# Patient Record
Sex: FEMALE | Race: WHITE | NOT HISPANIC OR LATINO | Employment: OTHER | URBAN - METROPOLITAN AREA
[De-identification: names, ages, dates, MRNs, and addresses within clinical notes are randomized per-mention and may not be internally consistent; named-entity substitution may affect disease eponyms.]

---

## 2023-02-22 LAB
ALANINE AMINOTRANSFERASE (SGPT) (U/L) IN SER/PLAS: 10 U/L (ref 7–45)
ALBUMIN (G/DL) IN SER/PLAS: 4.3 G/DL (ref 3.4–5)
ALKALINE PHOSPHATASE (U/L) IN SER/PLAS: 67 U/L (ref 33–136)
ANION GAP IN SER/PLAS: 11 MMOL/L (ref 10–20)
ASPARTATE AMINOTRANSFERASE (SGOT) (U/L) IN SER/PLAS: 19 U/L (ref 9–39)
BILIRUBIN TOTAL (MG/DL) IN SER/PLAS: 0.6 MG/DL (ref 0–1.2)
CALCIDIOL (25 OH VITAMIN D3) (NG/ML) IN SER/PLAS: 40 NG/ML
CALCIUM (MG/DL) IN SER/PLAS: 9.3 MG/DL (ref 8.6–10.3)
CARBON DIOXIDE, TOTAL (MMOL/L) IN SER/PLAS: 30 MMOL/L (ref 21–32)
CHLORIDE (MMOL/L) IN SER/PLAS: 98 MMOL/L (ref 98–107)
CHOLESTEROL (MG/DL) IN SER/PLAS: 241 MG/DL (ref 0–199)
CHOLESTEROL IN HDL (MG/DL) IN SER/PLAS: 88.8 MG/DL
CHOLESTEROL/HDL RATIO: 2.7
CREATININE (MG/DL) IN SER/PLAS: 1.02 MG/DL (ref 0.5–1.05)
GFR FEMALE: 51 ML/MIN/1.73M2
GLUCOSE (MG/DL) IN SER/PLAS: 101 MG/DL (ref 74–99)
LDL: 141 MG/DL (ref 0–99)
POTASSIUM (MMOL/L) IN SER/PLAS: 4.2 MMOL/L (ref 3.5–5.3)
PROTEIN TOTAL: 7.3 G/DL (ref 6.4–8.2)
SODIUM (MMOL/L) IN SER/PLAS: 135 MMOL/L (ref 136–145)
THYROTROPIN (MIU/L) IN SER/PLAS BY DETECTION LIMIT <= 0.05 MIU/L: 4.65 MIU/L (ref 0.44–3.98)
THYROXINE (T4) FREE (NG/DL) IN SER/PLAS: 0.89 NG/DL (ref 0.61–1.12)
TRIGLYCERIDE (MG/DL) IN SER/PLAS: 58 MG/DL (ref 0–149)
UREA NITROGEN (MG/DL) IN SER/PLAS: 26 MG/DL (ref 6–23)
VLDL: 12 MG/DL (ref 0–40)

## 2023-09-13 LAB
ANION GAP IN SER/PLAS: 12 MMOL/L (ref 10–20)
BASOPHILS (10*3/UL) IN BLOOD BY AUTOMATED COUNT: 0.03 X10E9/L (ref 0–0.1)
BASOPHILS/100 LEUKOCYTES IN BLOOD BY AUTOMATED COUNT: 0.4 % (ref 0–2)
CALCIUM (MG/DL) IN SER/PLAS: 8.3 MG/DL (ref 8.6–10.3)
CARBON DIOXIDE, TOTAL (MMOL/L) IN SER/PLAS: 30 MMOL/L (ref 21–32)
CHLORIDE (MMOL/L) IN SER/PLAS: 97 MMOL/L (ref 98–107)
CREATININE (MG/DL) IN SER/PLAS: 0.95 MG/DL (ref 0.5–1.05)
EOSINOPHILS (10*3/UL) IN BLOOD BY AUTOMATED COUNT: 0.27 X10E9/L (ref 0–0.4)
EOSINOPHILS/100 LEUKOCYTES IN BLOOD BY AUTOMATED COUNT: 3.4 % (ref 0–6)
ERYTHROCYTE DISTRIBUTION WIDTH (RATIO) BY AUTOMATED COUNT: 13.3 % (ref 11.5–14.5)
ERYTHROCYTE MEAN CORPUSCULAR HEMOGLOBIN CONCENTRATION (G/DL) BY AUTOMATED: 31.1 G/DL (ref 32–36)
ERYTHROCYTE MEAN CORPUSCULAR VOLUME (FL) BY AUTOMATED COUNT: 92 FL (ref 80–100)
ERYTHROCYTES (10*6/UL) IN BLOOD BY AUTOMATED COUNT: 3.39 X10E12/L (ref 4–5.2)
GFR FEMALE: 56 ML/MIN/1.73M2
GLUCOSE (MG/DL) IN SER/PLAS: 79 MG/DL (ref 74–99)
HEMATOCRIT (%) IN BLOOD BY AUTOMATED COUNT: 31.2 % (ref 36–46)
HEMOGLOBIN (G/DL) IN BLOOD: 9.7 G/DL (ref 12–16)
IMMATURE GRANULOCYTES/100 LEUKOCYTES IN BLOOD BY AUTOMATED COUNT: 1.4 % (ref 0–0.9)
LEUKOCYTES (10*3/UL) IN BLOOD BY AUTOMATED COUNT: 7.9 X10E9/L (ref 4.4–11.3)
LYMPHOCYTES (10*3/UL) IN BLOOD BY AUTOMATED COUNT: 1.01 X10E9/L (ref 0.8–3)
LYMPHOCYTES/100 LEUKOCYTES IN BLOOD BY AUTOMATED COUNT: 12.8 % (ref 13–44)
MONOCYTES (10*3/UL) IN BLOOD BY AUTOMATED COUNT: 0.68 X10E9/L (ref 0.05–0.8)
MONOCYTES/100 LEUKOCYTES IN BLOOD BY AUTOMATED COUNT: 8.6 % (ref 2–10)
NEUTROPHILS (10*3/UL) IN BLOOD BY AUTOMATED COUNT: 5.78 X10E9/L (ref 1.6–5.5)
NEUTROPHILS/100 LEUKOCYTES IN BLOOD BY AUTOMATED COUNT: 73.4 % (ref 40–80)
NRBC (PER 100 WBCS) BY AUTOMATED COUNT: 0 /100 WBC (ref 0–0)
PLATELETS (10*3/UL) IN BLOOD AUTOMATED COUNT: 316 X10E9/L (ref 150–450)
POTASSIUM (MMOL/L) IN SER/PLAS: 4.5 MMOL/L (ref 3.5–5.3)
SODIUM (MMOL/L) IN SER/PLAS: 134 MMOL/L (ref 136–145)
UREA NITROGEN (MG/DL) IN SER/PLAS: 29 MG/DL (ref 6–23)

## 2023-09-14 LAB
ALANINE AMINOTRANSFERASE (SGPT) (U/L) IN SER/PLAS: 40 U/L (ref 7–45)
ALBUMIN (G/DL) IN SER/PLAS: 2.9 G/DL (ref 3.4–5)
ALKALINE PHOSPHATASE (U/L) IN SER/PLAS: 66 U/L (ref 33–136)
ANION GAP IN SER/PLAS: 12 MMOL/L (ref 10–20)
ASPARTATE AMINOTRANSFERASE (SGOT) (U/L) IN SER/PLAS: 45 U/L (ref 9–39)
BASOPHILS (10*3/UL) IN BLOOD BY AUTOMATED COUNT: 0.03 X10E9/L (ref 0–0.1)
BASOPHILS/100 LEUKOCYTES IN BLOOD BY AUTOMATED COUNT: 0.3 % (ref 0–2)
BILIRUBIN TOTAL (MG/DL) IN SER/PLAS: 0.3 MG/DL (ref 0–1.2)
CALCIDIOL (25 OH VITAMIN D3) (NG/ML) IN SER/PLAS: 36 NG/ML
CALCIUM (MG/DL) IN SER/PLAS: 8.5 MG/DL (ref 8.6–10.3)
CARBON DIOXIDE, TOTAL (MMOL/L) IN SER/PLAS: 29 MMOL/L (ref 21–32)
CHLORIDE (MMOL/L) IN SER/PLAS: 98 MMOL/L (ref 98–107)
COBALAMIN (VITAMIN B12) (PG/ML) IN SER/PLAS: 629 PG/ML (ref 211–911)
CREATININE (MG/DL) IN SER/PLAS: 0.96 MG/DL (ref 0.5–1.05)
EOSINOPHILS (10*3/UL) IN BLOOD BY AUTOMATED COUNT: 0.26 X10E9/L (ref 0–0.4)
EOSINOPHILS/100 LEUKOCYTES IN BLOOD BY AUTOMATED COUNT: 2.9 % (ref 0–6)
ERYTHROCYTE DISTRIBUTION WIDTH (RATIO) BY AUTOMATED COUNT: 13.5 % (ref 11.5–14.5)
ERYTHROCYTE MEAN CORPUSCULAR HEMOGLOBIN CONCENTRATION (G/DL) BY AUTOMATED: 30.6 G/DL (ref 32–36)
ERYTHROCYTE MEAN CORPUSCULAR VOLUME (FL) BY AUTOMATED COUNT: 94 FL (ref 80–100)
ERYTHROCYTES (10*6/UL) IN BLOOD BY AUTOMATED COUNT: 3.25 X10E12/L (ref 4–5.2)
FOLATE (NG/ML) IN SER/PLAS: 13.9 NG/ML
GFR FEMALE: 55 ML/MIN/1.73M2
GLUCOSE (MG/DL) IN SER/PLAS: 73 MG/DL (ref 74–99)
HEMATOCRIT (%) IN BLOOD BY AUTOMATED COUNT: 30.4 % (ref 36–46)
HEMOGLOBIN (G/DL) IN BLOOD: 9.3 G/DL (ref 12–16)
IMMATURE GRANULOCYTES/100 LEUKOCYTES IN BLOOD BY AUTOMATED COUNT: 1.1 % (ref 0–0.9)
LEUKOCYTES (10*3/UL) IN BLOOD BY AUTOMATED COUNT: 9 X10E9/L (ref 4.4–11.3)
LYMPHOCYTES (10*3/UL) IN BLOOD BY AUTOMATED COUNT: 1.09 X10E9/L (ref 0.8–3)
LYMPHOCYTES/100 LEUKOCYTES IN BLOOD BY AUTOMATED COUNT: 12.1 % (ref 13–44)
MAGNESIUM (MG/DL) IN SER/PLAS: 1.86 MG/DL (ref 1.6–2.4)
MONOCYTES (10*3/UL) IN BLOOD BY AUTOMATED COUNT: 0.91 X10E9/L (ref 0.05–0.8)
MONOCYTES/100 LEUKOCYTES IN BLOOD BY AUTOMATED COUNT: 10.1 % (ref 2–10)
NEUTROPHILS (10*3/UL) IN BLOOD BY AUTOMATED COUNT: 6.62 X10E9/L (ref 1.6–5.5)
NEUTROPHILS/100 LEUKOCYTES IN BLOOD BY AUTOMATED COUNT: 73.5 % (ref 40–80)
NRBC (PER 100 WBCS) BY AUTOMATED COUNT: 0 /100 WBC (ref 0–0)
PLATELETS (10*3/UL) IN BLOOD AUTOMATED COUNT: 247 X10E9/L (ref 150–450)
POTASSIUM (MMOL/L) IN SER/PLAS: 4.5 MMOL/L (ref 3.5–5.3)
PROTEIN TOTAL: 5.1 G/DL (ref 6.4–8.2)
SODIUM (MMOL/L) IN SER/PLAS: 134 MMOL/L (ref 136–145)
UREA NITROGEN (MG/DL) IN SER/PLAS: 31 MG/DL (ref 6–23)

## 2023-11-26 ENCOUNTER — HOSPITAL ENCOUNTER (INPATIENT)
Facility: HOSPITAL | Age: 88
LOS: 9 days | Discharge: NURSING, REHAB OR LONG-TERM CARE | DRG: 689 | End: 2023-12-07
Attending: EMERGENCY MEDICINE | Admitting: INTERNAL MEDICINE
Payer: MEDICARE

## 2023-11-26 ENCOUNTER — HOSPITAL ENCOUNTER (OUTPATIENT)
Dept: CARDIOLOGY | Facility: HOSPITAL | Age: 88
Discharge: HOME | End: 2023-11-26
Payer: COMMERCIAL

## 2023-11-26 ENCOUNTER — APPOINTMENT (OUTPATIENT)
Dept: RADIOLOGY | Facility: HOSPITAL | Age: 88
DRG: 689 | End: 2023-11-26
Payer: MEDICARE

## 2023-11-26 DIAGNOSIS — R53.1 GENERALIZED WEAKNESS: Primary | ICD-10-CM

## 2023-11-26 DIAGNOSIS — M86.00 ACUTE HEMATOGENOUS OSTEOMYELITIS, UNSPECIFIED SITE (MULTI): ICD-10-CM

## 2023-11-26 DIAGNOSIS — N30.00 ACUTE CYSTITIS WITHOUT HEMATURIA: ICD-10-CM

## 2023-11-26 DIAGNOSIS — N39.0 URINARY TRACT INFECTION WITHOUT HEMATURIA, SITE UNSPECIFIED: ICD-10-CM

## 2023-11-26 LAB
ALBUMIN SERPL BCP-MCNC: 3.8 G/DL (ref 3.4–5)
ALP SERPL-CCNC: 73 U/L (ref 33–136)
ALT SERPL W P-5'-P-CCNC: 11 U/L (ref 7–45)
ANION GAP SERPL CALC-SCNC: 12 MMOL/L (ref 10–20)
APPEARANCE UR: ABNORMAL
AST SERPL W P-5'-P-CCNC: 20 U/L (ref 9–39)
BACTERIA #/AREA URNS AUTO: ABNORMAL /HPF
BASOPHILS # BLD AUTO: 0.05 X10*3/UL (ref 0–0.1)
BASOPHILS NFR BLD AUTO: 0.7 %
BILIRUB SERPL-MCNC: 0.4 MG/DL (ref 0–1.2)
BILIRUB UR STRIP.AUTO-MCNC: NEGATIVE MG/DL
BNP SERPL-MCNC: 341 PG/ML (ref 0–99)
BUN SERPL-MCNC: 26 MG/DL (ref 6–23)
CALCIUM SERPL-MCNC: 9.3 MG/DL (ref 8.6–10.3)
CARDIAC TROPONIN I PNL SERPL HS: 20 NG/L (ref 0–13)
CARDIAC TROPONIN I PNL SERPL HS: 20 NG/L (ref 0–13)
CARDIAC TROPONIN I PNL SERPL HS: 21 NG/L (ref 0–13)
CHLORIDE SERPL-SCNC: 99 MMOL/L (ref 98–107)
CO2 SERPL-SCNC: 29 MMOL/L (ref 21–32)
COLOR UR: YELLOW
CREAT SERPL-MCNC: 0.91 MG/DL (ref 0.5–1.05)
EOSINOPHIL # BLD AUTO: 0.15 X10*3/UL (ref 0–0.4)
EOSINOPHIL NFR BLD AUTO: 2 %
ERYTHROCYTE [DISTWIDTH] IN BLOOD BY AUTOMATED COUNT: 13.9 % (ref 11.5–14.5)
FLUAV RNA RESP QL NAA+PROBE: NOT DETECTED
FLUBV RNA RESP QL NAA+PROBE: NOT DETECTED
GFR SERPL CREATININE-BSD FRML MDRD: 59 ML/MIN/1.73M*2
GLUCOSE SERPL-MCNC: 102 MG/DL (ref 74–99)
GLUCOSE UR STRIP.AUTO-MCNC: NEGATIVE MG/DL
HCT VFR BLD AUTO: 32.8 % (ref 36–46)
HGB BLD-MCNC: 10.5 G/DL (ref 12–16)
HOLD SPECIMEN: NORMAL
IMM GRANULOCYTES # BLD AUTO: 0.03 X10*3/UL (ref 0–0.5)
IMM GRANULOCYTES NFR BLD AUTO: 0.4 % (ref 0–0.9)
KETONES UR STRIP.AUTO-MCNC: NEGATIVE MG/DL
LEUKOCYTE ESTERASE UR QL STRIP.AUTO: ABNORMAL
LYMPHOCYTES # BLD AUTO: 0.74 X10*3/UL (ref 0.8–3)
LYMPHOCYTES NFR BLD AUTO: 9.7 %
MCH RBC QN AUTO: 30.5 PG (ref 26–34)
MCHC RBC AUTO-ENTMCNC: 32 G/DL (ref 32–36)
MCV RBC AUTO: 95 FL (ref 80–100)
MONOCYTES # BLD AUTO: 0.66 X10*3/UL (ref 0.05–0.8)
MONOCYTES NFR BLD AUTO: 8.7 %
NEUTROPHILS # BLD AUTO: 5.98 X10*3/UL (ref 1.6–5.5)
NEUTROPHILS NFR BLD AUTO: 78.5 %
NITRITE UR QL STRIP.AUTO: NEGATIVE
NRBC BLD-RTO: 0 /100 WBCS (ref 0–0)
PH UR STRIP.AUTO: 5 [PH]
PLATELET # BLD AUTO: 277 X10*3/UL (ref 150–450)
POTASSIUM SERPL-SCNC: 4.5 MMOL/L (ref 3.5–5.3)
PROT SERPL-MCNC: 7.3 G/DL (ref 6.4–8.2)
PROT UR STRIP.AUTO-MCNC: NEGATIVE MG/DL
RBC # BLD AUTO: 3.44 X10*6/UL (ref 4–5.2)
RBC # UR STRIP.AUTO: ABNORMAL /UL
RBC #/AREA URNS AUTO: ABNORMAL /HPF
SARS-COV-2 RNA RESP QL NAA+PROBE: DETECTED
SODIUM SERPL-SCNC: 135 MMOL/L (ref 136–145)
SP GR UR STRIP.AUTO: 1.01
SQUAMOUS #/AREA URNS AUTO: ABNORMAL /HPF
UROBILINOGEN UR STRIP.AUTO-MCNC: 2 MG/DL
WBC # BLD AUTO: 7.6 X10*3/UL (ref 4.4–11.3)
WBC #/AREA URNS AUTO: ABNORMAL /HPF

## 2023-11-26 PROCEDURE — 2500000004 HC RX 250 GENERAL PHARMACY W/ HCPCS (ALT 636 FOR OP/ED): Performed by: EMERGENCY MEDICINE

## 2023-11-26 PROCEDURE — 80053 COMPREHEN METABOLIC PANEL: CPT | Performed by: EMERGENCY MEDICINE

## 2023-11-26 PROCEDURE — G0378 HOSPITAL OBSERVATION PER HR: HCPCS

## 2023-11-26 PROCEDURE — 85025 COMPLETE CBC W/AUTO DIFF WBC: CPT | Performed by: EMERGENCY MEDICINE

## 2023-11-26 PROCEDURE — 94762 N-INVAS EAR/PLS OXIMTRY CONT: CPT

## 2023-11-26 PROCEDURE — 93005 ELECTROCARDIOGRAM TRACING: CPT

## 2023-11-26 PROCEDURE — 36415 COLL VENOUS BLD VENIPUNCTURE: CPT | Performed by: NURSE PRACTITIONER

## 2023-11-26 PROCEDURE — 36415 COLL VENOUS BLD VENIPUNCTURE: CPT | Performed by: EMERGENCY MEDICINE

## 2023-11-26 PROCEDURE — 81001 URINALYSIS AUTO W/SCOPE: CPT | Performed by: EMERGENCY MEDICINE

## 2023-11-26 PROCEDURE — 84484 ASSAY OF TROPONIN QUANT: CPT | Performed by: NURSE PRACTITIONER

## 2023-11-26 PROCEDURE — 2500000004 HC RX 250 GENERAL PHARMACY W/ HCPCS (ALT 636 FOR OP/ED): Performed by: NURSE PRACTITIONER

## 2023-11-26 PROCEDURE — 96372 THER/PROPH/DIAG INJ SC/IM: CPT | Mod: 59

## 2023-11-26 PROCEDURE — 71045 X-RAY EXAM CHEST 1 VIEW: CPT | Performed by: RADIOLOGY

## 2023-11-26 PROCEDURE — 84484 ASSAY OF TROPONIN QUANT: CPT | Performed by: EMERGENCY MEDICINE

## 2023-11-26 PROCEDURE — 99285 EMERGENCY DEPT VISIT HI MDM: CPT | Performed by: EMERGENCY MEDICINE

## 2023-11-26 PROCEDURE — 99222 1ST HOSP IP/OBS MODERATE 55: CPT | Performed by: NURSE PRACTITIONER

## 2023-11-26 PROCEDURE — 2500000001 HC RX 250 WO HCPCS SELF ADMINISTERED DRUGS (ALT 637 FOR MEDICARE OP): Performed by: NURSE PRACTITIONER

## 2023-11-26 PROCEDURE — 87086 URINE CULTURE/COLONY COUNT: CPT | Mod: PARLAB | Performed by: EMERGENCY MEDICINE

## 2023-11-26 PROCEDURE — 71045 X-RAY EXAM CHEST 1 VIEW: CPT | Mod: FY

## 2023-11-26 PROCEDURE — 83880 ASSAY OF NATRIURETIC PEPTIDE: CPT | Performed by: EMERGENCY MEDICINE

## 2023-11-26 PROCEDURE — 96365 THER/PROPH/DIAG IV INF INIT: CPT

## 2023-11-26 PROCEDURE — 87636 SARSCOV2 & INF A&B AMP PRB: CPT | Performed by: NURSE PRACTITIONER

## 2023-11-26 RX ORDER — AMLODIPINE BESYLATE 10 MG/1
10 TABLET ORAL DAILY
COMMUNITY
Start: 2023-03-09

## 2023-11-26 RX ORDER — ATORVASTATIN CALCIUM 20 MG/1
20 TABLET, FILM COATED ORAL DAILY
COMMUNITY

## 2023-11-26 RX ORDER — ACETAMINOPHEN 650 MG/1
650 SUPPOSITORY RECTAL EVERY 4 HOURS PRN
Status: DISCONTINUED | OUTPATIENT
Start: 2023-11-26 | End: 2023-12-08 | Stop reason: HOSPADM

## 2023-11-26 RX ORDER — ENOXAPARIN SODIUM 100 MG/ML
30 INJECTION SUBCUTANEOUS EVERY 24 HOURS
Status: DISCONTINUED | OUTPATIENT
Start: 2023-11-26 | End: 2023-12-08 | Stop reason: HOSPADM

## 2023-11-26 RX ORDER — ASPIRIN 81 MG/1
81 TABLET ORAL DAILY
Status: DISCONTINUED | OUTPATIENT
Start: 2023-11-26 | End: 2023-12-08 | Stop reason: HOSPADM

## 2023-11-26 RX ORDER — CEFTRIAXONE 1 G/50ML
1 INJECTION, SOLUTION INTRAVENOUS ONCE
Status: COMPLETED | OUTPATIENT
Start: 2023-11-26 | End: 2023-11-26

## 2023-11-26 RX ORDER — LANOLIN ALCOHOL/MO/W.PET/CERES
1 CREAM (GRAM) TOPICAL DAILY
COMMUNITY
Start: 2019-06-16

## 2023-11-26 RX ORDER — LEVOTHYROXINE SODIUM 75 UG/1
75 TABLET ORAL
Status: DISCONTINUED | OUTPATIENT
Start: 2023-11-27 | End: 2023-12-08 | Stop reason: HOSPADM

## 2023-11-26 RX ORDER — ASPIRIN 81 MG/1
81 TABLET ORAL DAILY
COMMUNITY
Start: 2011-10-17

## 2023-11-26 RX ORDER — ANASTROZOLE 1 MG/1
1 TABLET ORAL DAILY
Status: DISCONTINUED | OUTPATIENT
Start: 2023-11-26 | End: 2023-12-08 | Stop reason: HOSPADM

## 2023-11-26 RX ORDER — ACETAMINOPHEN 160 MG/5ML
650 SOLUTION ORAL EVERY 4 HOURS PRN
Status: DISCONTINUED | OUTPATIENT
Start: 2023-11-26 | End: 2023-12-08 | Stop reason: HOSPADM

## 2023-11-26 RX ORDER — ATORVASTATIN CALCIUM 20 MG/1
20 TABLET, FILM COATED ORAL DAILY
Status: DISCONTINUED | OUTPATIENT
Start: 2023-11-26 | End: 2023-12-08 | Stop reason: HOSPADM

## 2023-11-26 RX ORDER — LANOLIN ALCOHOL/MO/W.PET/CERES
1000 CREAM (GRAM) TOPICAL DAILY
Status: DISCONTINUED | OUTPATIENT
Start: 2023-11-26 | End: 2023-12-08 | Stop reason: HOSPADM

## 2023-11-26 RX ORDER — CHOLECALCIFEROL (VITAMIN D3) 50 MCG
1 TABLET ORAL DAILY
COMMUNITY
Start: 2021-06-11

## 2023-11-26 RX ORDER — CHOLECALCIFEROL (VITAMIN D3) 25 MCG
2000 TABLET ORAL DAILY
Status: DISCONTINUED | OUTPATIENT
Start: 2023-11-26 | End: 2023-12-08 | Stop reason: HOSPADM

## 2023-11-26 RX ORDER — LEVOTHYROXINE SODIUM 75 UG/1
0.5 TABLET ORAL
COMMUNITY

## 2023-11-26 RX ORDER — POLYETHYLENE GLYCOL 3350 17 G/17G
17 POWDER, FOR SOLUTION ORAL DAILY
Status: DISCONTINUED | OUTPATIENT
Start: 2023-11-26 | End: 2023-12-08 | Stop reason: HOSPADM

## 2023-11-26 RX ORDER — LEVOTHYROXINE SODIUM 75 UG/1
37.5 TABLET ORAL
Status: DISCONTINUED | OUTPATIENT
Start: 2023-11-26 | End: 2023-12-08 | Stop reason: HOSPADM

## 2023-11-26 RX ORDER — AMLODIPINE BESYLATE 10 MG/1
10 TABLET ORAL DAILY
Status: DISCONTINUED | OUTPATIENT
Start: 2023-11-26 | End: 2023-12-08 | Stop reason: HOSPADM

## 2023-11-26 RX ORDER — CEFTRIAXONE 2 G/50ML
2 INJECTION, SOLUTION INTRAVENOUS EVERY 24 HOURS
Status: DISCONTINUED | OUTPATIENT
Start: 2023-11-26 | End: 2023-11-28

## 2023-11-26 RX ORDER — ANASTROZOLE 1 MG/1
1 TABLET ORAL DAILY
COMMUNITY
Start: 2020-10-27

## 2023-11-26 RX ORDER — ACETAMINOPHEN 325 MG/1
650 TABLET ORAL EVERY 4 HOURS PRN
Status: DISCONTINUED | OUTPATIENT
Start: 2023-11-26 | End: 2023-12-08 | Stop reason: HOSPADM

## 2023-11-26 RX ORDER — LEVOTHYROXINE SODIUM 75 UG/1
75 TABLET ORAL
COMMUNITY
Start: 2018-04-03

## 2023-11-26 RX ORDER — L. ACIDOPHILUS/L.BULGARICUS 1MM CELL
1 TABLET ORAL 2 TIMES DAILY
Status: DISCONTINUED | OUTPATIENT
Start: 2023-11-26 | End: 2023-12-08 | Stop reason: HOSPADM

## 2023-11-26 RX ADMIN — Medication 1 TABLET: at 21:11

## 2023-11-26 RX ADMIN — ACETAMINOPHEN 650 MG: 160 SOLUTION ORAL at 21:19

## 2023-11-26 RX ADMIN — AMLODIPINE BESYLATE 10 MG: 10 TABLET ORAL at 17:04

## 2023-11-26 RX ADMIN — ACETAMINOPHEN 650 MG: 325 TABLET ORAL at 16:49

## 2023-11-26 RX ADMIN — ENOXAPARIN SODIUM 30 MG: 30 INJECTION SUBCUTANEOUS at 16:57

## 2023-11-26 RX ADMIN — ASPIRIN 81 MG: 81 TABLET, COATED ORAL at 14:55

## 2023-11-26 RX ADMIN — POLYETHYLENE GLYCOL 3350 17 G: 17 POWDER, FOR SOLUTION ORAL at 17:01

## 2023-11-26 RX ADMIN — ANASTROZOLE 1 MG: 1 TABLET, COATED ORAL at 16:52

## 2023-11-26 RX ADMIN — ATORVASTATIN CALCIUM 20 MG: 20 TABLET, FILM COATED ORAL at 16:51

## 2023-11-26 RX ADMIN — Medication 1 TABLET: at 16:46

## 2023-11-26 RX ADMIN — CHOLECALCIFEROL TAB 25 MCG (1000 UNIT) 2000 UNITS: 25 TAB at 16:44

## 2023-11-26 RX ADMIN — CYANOCOBALAMIN TAB 1000 MCG 1000 MCG: 1000 TAB at 16:51

## 2023-11-26 RX ADMIN — CEFTRIAXONE SODIUM 1 G: 1 INJECTION, SOLUTION INTRAVENOUS at 13:15

## 2023-11-26 ASSESSMENT — COLUMBIA-SUICIDE SEVERITY RATING SCALE - C-SSRS
2. HAVE YOU ACTUALLY HAD ANY THOUGHTS OF KILLING YOURSELF?: NO
6. HAVE YOU EVER DONE ANYTHING, STARTED TO DO ANYTHING, OR PREPARED TO DO ANYTHING TO END YOUR LIFE?: NO
1. IN THE PAST MONTH, HAVE YOU WISHED YOU WERE DEAD OR WISHED YOU COULD GO TO SLEEP AND NOT WAKE UP?: NO

## 2023-11-26 ASSESSMENT — PAIN SCALES - GENERAL
PAINLEVEL_OUTOF10: 0 - NO PAIN
PAINLEVEL_OUTOF10: 3
PAINLEVEL_OUTOF10: 0 - NO PAIN
PAINLEVEL_OUTOF10: 0 - NO PAIN

## 2023-11-26 ASSESSMENT — PAIN - FUNCTIONAL ASSESSMENT
PAIN_FUNCTIONAL_ASSESSMENT: 0-10

## 2023-11-26 ASSESSMENT — PAIN DESCRIPTION - DESCRIPTORS: DESCRIPTORS: ACHING

## 2023-11-26 NOTE — H&P
History Of Present Illness  Regi Leger is a 89 y.o. female with past medical history of abdominal aortic aneurysm s/p repair (01/2022), endoleak of aortic graft (no intervention pursued per patient's wishes), severe aortic stenosis, CAD s/p PCI/stent RCA, hypertension, hyperlipidemia, hypothyroidism, vocal cord paralysis following thyroidectomy,  CKD stage III, nephrolithiasis, urinary retention, PVD, anemia, osteoarthritis, DJD, breast cancer, and skin cancer who presented today with weakness.  HPI somewhat limited due to patient is hard of hearing.  She reports over the past few days she has been experiencing progressively worsening weakness.  She reports she became concerned that she may fall so she came to the emergency room for evaluation.  She admits to associated body aches that she has been taking over-the-counter medicine for.  She denies any fevers, chills, chest pain, shortness of breath, cough, abdominal pain, nausea, vomiting, diarrhea, or dysuria, changes in her chronic urinary incontinence.    In the ED lab work, EKG, and chest x-ray were performed. Labs revealed glucose 102, sodium 135 (chronically mildly low), bun 26 (chronic), , troponin 20, glucose 22, chronic anemia noted with red blood cell count 2.44, hemoglobin 10.5, and hematocrit 32.8, neutrophils 5.98, lymphocytes 0.74 UA revealed yellow hazy urine with moderate blood urobilinogen 2, large leuks.  Chest x-ray revealed no acute process.  EKG, per ER physician impression revealed normal sinus rhythm at a rate of 67 with nonspecific changes.  Vital signs were stable while in the ED.  She was given Rocephin and admitted to Dr. Vasquez.    10 point ROS negative except as noted above in HPI     Past medical history: As above  Past surgical history: Thyroidectomy, hysterectomy, PTCA/PCI with stent to RCA, AAA repair, cataract surgery  Social history: No history of smoking, alcohol abuse, or illicit drug use.  Lives with sister and  "nephew.  Ambulates with walker.  Family history: Father-stomach cancer; mother-myocardial infarction     Allergies  Pravastatin and Penicillins     Physical Exam  Constitutional:       Comments: Thin   HENT:      Head: Normocephalic and atraumatic.      Mouth/Throat:      Mouth: Mucous membranes are dry.   Eyes:      Conjunctiva/sclera: Conjunctivae normal.   Cardiovascular:      Rate and Rhythm: Normal rate and regular rhythm.      Heart sounds: Murmur heard.   Pulmonary:      Effort: Pulmonary effort is normal.      Breath sounds: No rales.   Abdominal:      General: Abdomen is flat. Bowel sounds are normal.      Palpations: Abdomen is soft.   Musculoskeletal:         General: Normal range of motion.      Cervical back: Neck supple.   Neurological:      Mental Status: She is alert. Mental status is at baseline.   Psychiatric:         Mood and Affect: Mood normal.          Last Recorded Vitals  Blood pressure (!) 155/97, pulse 77, temperature 36.5 °C (97.7 °F), temperature source Tympanic, resp. rate 25, height 1.727 m (5' 8\"), weight 51.3 kg (113 lb), SpO2 97 %.    Relevant Results    No current facility-administered medications on file prior to encounter.     Current Outpatient Medications on File Prior to Encounter   Medication Sig Dispense Refill    amLODIPine (Norvasc) 10 mg tablet Take 1 tablet (10 mg) by mouth once daily.      anastrozole (Arimidex) 1 mg tablet Take 1 tablet (1 mg total) by mouth once daily.      aspirin 81 mg EC tablet Take 1 tablet (81 mg) by mouth once daily.      cholecalciferol (Vitamin D-3) 50 MCG (2000 UT) tablet Take 1 tablet (2,000 Units) by mouth once daily.      cyanocobalamin (Vitamin B-12) 1,000 mcg tablet Take 1 tablet (1,000 mcg) by mouth once daily.      levothyroxine (Synthroid) 75 mcg tablet Take 1 tablet (75 mcg) by mouth once a day on Monday, Wednesday, and Friday.      atorvastatin (Lipitor) 20 mg tablet Take 1 tablet (20 mg) by mouth once daily.      levothyroxine " (Synthroid, Levoxyl) 75 mcg tablet Take 0.5 tablets (37.5 mcg) by mouth once a day on Sunday, Tuesday, Thursday, and Saturday.        Results for orders placed or performed during the hospital encounter of 11/26/23 (from the past 24 hour(s))   CBC and Auto Differential   Result Value Ref Range    WBC 7.6 4.4 - 11.3 x10*3/uL    nRBC 0.0 0.0 - 0.0 /100 WBCs    RBC 3.44 (L) 4.00 - 5.20 x10*6/uL    Hemoglobin 10.5 (L) 12.0 - 16.0 g/dL    Hematocrit 32.8 (L) 36.0 - 46.0 %    MCV 95 80 - 100 fL    MCH 30.5 26.0 - 34.0 pg    MCHC 32.0 32.0 - 36.0 g/dL    RDW 13.9 11.5 - 14.5 %    Platelets 277 150 - 450 x10*3/uL    Neutrophils % 78.5 40.0 - 80.0 %    Immature Granulocytes %, Automated 0.4 0.0 - 0.9 %    Lymphocytes % 9.7 13.0 - 44.0 %    Monocytes % 8.7 2.0 - 10.0 %    Eosinophils % 2.0 0.0 - 6.0 %    Basophils % 0.7 0.0 - 2.0 %    Neutrophils Absolute 5.98 (H) 1.60 - 5.50 x10*3/uL    Immature Granulocytes Absolute, Automated 0.03 0.00 - 0.50 x10*3/uL    Lymphocytes Absolute 0.74 (L) 0.80 - 3.00 x10*3/uL    Monocytes Absolute 0.66 0.05 - 0.80 x10*3/uL    Eosinophils Absolute 0.15 0.00 - 0.40 x10*3/uL    Basophils Absolute 0.05 0.00 - 0.10 x10*3/uL   Comprehensive metabolic panel   Result Value Ref Range    Glucose 102 (H) 74 - 99 mg/dL    Sodium 135 (L) 136 - 145 mmol/L    Potassium 4.5 3.5 - 5.3 mmol/L    Chloride 99 98 - 107 mmol/L    Bicarbonate 29 21 - 32 mmol/L    Anion Gap 12 10 - 20 mmol/L    Urea Nitrogen 26 (H) 6 - 23 mg/dL    Creatinine 0.91 0.50 - 1.05 mg/dL    eGFR 59 (L) >60 mL/min/1.73m*2    Calcium 9.3 8.6 - 10.3 mg/dL    Albumin 3.8 3.4 - 5.0 g/dL    Alkaline Phosphatase 73 33 - 136 U/L    Total Protein 7.3 6.4 - 8.2 g/dL    AST 20 9 - 39 U/L    Bilirubin, Total 0.4 0.0 - 1.2 mg/dL    ALT 11 7 - 45 U/L   Troponin I, High Sensitivity   Result Value Ref Range    Troponin I, High Sensitivity 20 (H) 0 - 13 ng/L   B-Type Natriuretic Peptide   Result Value Ref Range     (H) 0 - 99 pg/mL   Urinalysis with  Reflex Microscopic and Culture   Result Value Ref Range    Color, Urine Yellow Straw, Yellow    Appearance, Urine Hazy (N) Clear    Specific Gravity, Urine 1.015 1.005 - 1.035    pH, Urine 5.0 5.0, 5.5, 6.0, 6.5, 7.0, 7.5, 8.0    Protein, Urine NEGATIVE NEGATIVE mg/dL    Glucose, Urine NEGATIVE NEGATIVE mg/dL    Blood, Urine MODERATE (2+) (A) NEGATIVE    Ketones, Urine NEGATIVE NEGATIVE mg/dL    Bilirubin, Urine NEGATIVE NEGATIVE    Urobilinogen, Urine 2.0 (N) <2.0 mg/dL    Nitrite, Urine NEGATIVE NEGATIVE    Leukocyte Esterase, Urine LARGE (3+) (A) NEGATIVE   Extra Urine Gray Tube   Result Value Ref Range    Extra Tube Hold for add-ons.    Microscopic Only, Urine   Result Value Ref Range    WBC, Urine 21-50 (A) 1-5, NONE /HPF    RBC, Urine 3-5 NONE, 1-2, 3-5 /HPF    Squamous Epithelial Cells, Urine 1-9 (SPARSE) Reference range not established. /HPF    Bacteria, Urine 1+ (A) NONE SEEN /HPF       XR chest 1 view    Result Date: 11/26/2023  Interpreted By:  Prosper Olivas, STUDY: XR CHEST 1 VIEW;  11/26/2023 10:54 am   INDICATION: Signs/Symptoms:Weakness.   COMPARISON: Portable chest, 7 September 2023   ACCESSION NUMBER(S): [id number]   ORDERING CLINICIAN: NICKY SPENCER   TECHNIQUE: Single frontal view of the chest; Portable technique   FINDINGS:   The cardiomediastinal silhouette is unchanged   No consolidative lung opacity   No edema / failure   No large pleural effusion or demonstrable pneumothorax       NO ACUTE DISEASE IN THE CHEST   MACRO: None   Signed by: Prosper Olivas 11/26/2023 11:18 AM Dictation workstation:   [id number]      Assessment/Plan   Principal Problem:    UTI (urinary tract infection)  Body Aches   Elevated troponin  Weakness    Admit to [facility]  Observation  Continue rocephin  urine culture pending    CBC, BMP in am  PT/OT  Repeat troponin and EKG    Chronic conditions: Hypertension, hyperlipidemia, hypothyroidism, coronary artery disease, PVD, breast cancer    Continue home medications as listed  above    DVT prophylaxis    SCDs  Lovenox        I spent 45 minutes in the professional and overall care of this patient.    Patient fully evaluated plan as above.  Harmony Estrada, APRN-CNP

## 2023-11-26 NOTE — ED PROVIDER NOTES
HPI   Chief Complaint   Patient presents with    Weakness, Gen     Pt was recently admitted in the hospital , transferred to a nursing home, then recently discharged home withfamily. Pt states she is very weak and feels like she is going to fall. Otherwise state no issues.        89-year-old female who presents with generalized weakness.  Patient presented by squad stating that she has been weak over the past couple days.  She recently got out of a skilled nursing facility about a week ago.  She usually uses a walker at home.  She states the other day she was going to the bathroom was unable to get up from the toilet.  She denies any recent falls.  Denies any chest pain or shortness of breath.                          [place] Coma Scale Score: 15                  Patient History   Past Medical History:   Diagnosis Date    Abnormal findings on diagnostic imaging of other abdominal regions, including retroperitoneum     Abnormal abdominal ultrasound    Abnormal findings on diagnostic imaging of other abdominal regions, including retroperitoneum     Abnormal CT of the abdomen    Abnormal findings on diagnostic imaging of other specified body structures     Abnormal MRI    Abnormal findings on diagnostic imaging of other specified body structures     Abnormal ultrasound    Old myocardial infarction 12/09/2019    History of myocardial infarction    Personal history of other diseases of the nervous system and sense organs     History of cataract    Personal history of other medical treatment     History of mammogram    Personal history of other medical treatment     History of echocardiogram    Personal history of other specified conditions     History of heartburn    Personal history of urinary calculi 12/09/2019    History of renal calculi     Past Surgical History:   Procedure Laterality Date    CT ABDOMEN PELVIS ANGIOGRAM W AND/OR WO IV CONTRAST  12/9/2019    CT ABDOMEN PELVIS ANGIOGRAM W AND/OR WO IV CONTRAST  12/9/2019 [facility]    CT ABDOMEN PELVIS ANGIOGRAM W AND/OR WO IV CONTRAST  12/16/2021    CT ABDOMEN PELVIS ANGIOGRAM W AND/OR WO IV CONTRAST 12/16/2021 [facility]    CT ABDOMEN PELVIS ANGIOGRAM W AND/OR WO IV CONTRAST  8/15/2022    CT ABDOMEN PELVIS ANGIOGRAM W AND/OR WO IV CONTRAST 8/15/2022 [facility]    OTHER SURGICAL HISTORY  12/09/2019    Complete colonoscopy    OTHER SURGICAL HISTORY  12/09/2019    Hysterectomy    OTHER SURGICAL HISTORY  12/09/2019    Thyroidectomy    OTHER SURGICAL HISTORY  12/09/2019    Cataract surgery    OTHER SURGICAL HISTORY  01/13/2020    Cardiac catheterization with stent placement    OTHER SURGICAL HISTORY  01/13/2020    Cardiac catheterization    OTHER SURGICAL HISTORY  08/04/2020    Endoscopy    OTHER SURGICAL HISTORY  12/09/2019    Tonsillectomy     No family history on file.  Social History     Tobacco Use    Smoking status: Not on file    Smokeless tobacco: Not on file   Substance Use Topics    Alcohol use: Not on file    Drug use: Not on file       Physical Exam   ED Triage Vitals [11/26/23 0916]   Temp Heart Rate Resp BP   36.5 °C (97.7 °F) 69 18 134/60      SpO2 Temp Source Heart Rate Source Patient Position   99 % Tympanic Monitor --      BP Location FiO2 (%)     -- --       Physical Exam  Constitutional:       Appearance: Normal appearance. She is normal weight.   HENT:      Head: Normocephalic and atraumatic.      Nose: Nose normal.      Mouth/Throat:      Mouth: Mucous membranes are moist.      Pharynx: Oropharynx is clear.   Eyes:      Extraocular Movements: Extraocular movements intact.      Conjunctiva/sclera: Conjunctivae normal.      Pupils: Pupils are equal, round, and reactive to light.   Cardiovascular:      Rate and Rhythm: Normal rate and regular rhythm.   Pulmonary:      Effort: Pulmonary effort is normal.      Breath sounds: Normal breath sounds.   Abdominal:      General: Abdomen is flat. Bowel sounds are normal.       Palpations: Abdomen is soft.   Musculoskeletal:         General: Normal range of motion.      Cervical back: Normal range of motion and neck supple.   Skin:     General: Skin is warm and dry.      Capillary Refill: Capillary refill takes less than 2 seconds.   Neurological:      General: No focal deficit present.      Mental Status: She is alert.   Psychiatric:         Mood and Affect: Mood normal.         Behavior: Behavior normal.         Thought Content: Thought content normal.         Judgment: Judgment normal.       Labs Reviewed   CBC WITH AUTO DIFFERENTIAL - Abnormal       Result Value    WBC 7.6      nRBC 0.0      RBC 3.44 (*)     Hemoglobin 10.5 (*)     Hematocrit 32.8 (*)     MCV 95      MCH 30.5      MCHC 32.0      RDW 13.9      Platelets 277      Neutrophils % 78.5      Immature Granulocytes %, Automated 0.4      Lymphocytes % 9.7      Monocytes % 8.7      Eosinophils % 2.0      Basophils % 0.7      Neutrophils Absolute 5.98 (*)     Immature Granulocytes Absolute, Automated 0.03      Lymphocytes Absolute 0.74 (*)     Monocytes Absolute 0.66      Eosinophils Absolute 0.15      Basophils Absolute 0.05     COMPREHENSIVE METABOLIC PANEL - Abnormal    Glucose 102 (*)     Sodium 135 (*)     Potassium 4.5      Chloride 99      Bicarbonate 29      Anion Gap 12      Urea Nitrogen 26 (*)     Creatinine 0.91      eGFR 59 (*)     Calcium 9.3      Albumin 3.8      Alkaline Phosphatase 73      Total Protein 7.3      AST 20      Bilirubin, Total 0.4      ALT 11     TROPONIN I, HIGH SENSITIVITY - Abnormal    Troponin I, High Sensitivity 20 (*)     Narrative:     Less than 99th percentile of normal range cutoff-  Female and children under 18 years old <14 ng/L; Male <21 ng/L: Negative  Repeat testing should be performed if clinically indicated.     Female and children under 18 years old 14-50 ng/L; Male 21-50 ng/L:  Consistent with possible cardiac damage and possible increased clinical   risk. Serial measurements may  help to assess extent of myocardial damage.     >50 ng/L: Consistent with cardiac damage, increased clinical risk and  myocardial infarction. Serial measurements may help assess extent of   myocardial damage.      NOTE: Children less than 1 year old may have higher baseline troponin   levels and results should be interpreted in conjunction with the overall   clinical context.     NOTE: Troponin I testing is performed using a different   testing methodology at [facility] than at other   [facility]. Direct result comparisons should only   be made within the same method.   B-TYPE NATRIURETIC PEPTIDE - Abnormal     (*)     Narrative:        <100 pg/mL - Heart failure unlikely  100-299 pg/mL - Intermediate probability of acute heart                  failure exacerbation. Correlate with clinical                  context and patient history.    >=300 pg/mL - Heart Failure likely. Correlate with clinical                  context and patient history.    BNP testing is performed using different testing methodology at [facility] than at other [facility]. Direct result comparisons should only be made within the same method.      URINALYSIS WITH REFLEX MICROSCOPIC AND CULTURE - Abnormal    Color, Urine Yellow      Appearance, Urine Hazy (*)     Specific Gravity, Urine 1.015      pH, Urine 5.0      Protein, Urine NEGATIVE      Glucose, Urine NEGATIVE      Blood, Urine MODERATE (2+) (*)     Ketones, Urine NEGATIVE      Bilirubin, Urine NEGATIVE      Urobilinogen, Urine 2.0 (*)     Nitrite, Urine NEGATIVE      Leukocyte Esterase, Urine LARGE (3+) (*)    MICROSCOPIC ONLY, URINE - Abnormal    WBC, Urine 21-50 (*)     RBC, Urine 3-5      Squamous Epithelial Cells, Urine 1-9 (SPARSE)      Bacteria, Urine 1+ (*)    URINE CULTURE   URINALYSIS WITH REFLEX MICROSCOPIC AND CULTURE    Narrative:     The following orders were created for panel order Urinalysis with Reflex Microscopic and  Culture.  Procedure                               Abnormality         Status                     ---------                               -----------         ------                     Urinalysis with Reflex M...[[id number]]  Abnormal            Final result               Extra Urine Gray Tube[[id number]]                            In process                   Please view results for these tests on the individual orders.   EXTRA URINE GRAY TUBE       XR chest 1 view   Final Result   NO ACUTE DISEASE IN THE CHEST        MACRO:   None        Signed by: Prosper Olivas 11/26/2023 11:18 AM   Dictation workstation:   [id number]            ED Course & MDM   Diagnoses as of 11/26/23 1351   Generalized weakness   Urinary tract infection without hematuria, site unspecified       Medical Decision Making  Emergency department course, laboratory studies were obtained and reviewed which were unremarkable high-sensitivity troponin was 20.  EKG showed a normal sinus rhythm at a rate of 67 with nonspecific changes.  Chest x-ray shows no acute infiltrate or effusion.  Urinalysis was obtained which does show UTI present.  Urine culture will be sent.  Patient was given a gram of Rocephin IV.  We did attempt to ambulate the patient here in the emergency room she could barely get to the side of the bed without having weakness.  I did discuss this with Dr. Vasquez who is agreeable with admission.    Amount and/or Complexity of Data Reviewed  ECG/medicine tests: independent interpretation performed.     Details: EKG shows a normal sinus rhythm at a rate of 67 with nonspecific ST-T wave changes, interpreted by ED physician.        Procedure  Procedures     Shruthi Beck DO  11/26/23 1351

## 2023-11-26 NOTE — PROGRESS NOTES
Pharmacy Medication History Review    Regi Leger is a 89 y.o. female admitted for No Principal Problem: There is no principal problem currently on the Problem List. Please update the Problem List and refresh.. Pharmacy reviewed the patient's ronty-ni-aawrwlsoc medications and allergies for accuracy.    The list below reflectives the updated PTA list. Please review each medication in order reconciliation for additional clarification and justification.  (Not in a hospital admission)       The list below reflectives the updated allergy list. Please review each documented allergy for additional clarification and justification.  Allergies  Reviewed by Kelley Garrison RN on 11/26/2023        Severity Reactions Comments    Pravastatin Medium Myalgia     Penicillins Not Specified Unknown Hx from 40yrs ago             Below are additional concerns with the patient's PTA list.    See PTA med list    Vera Tello CPhT

## 2023-11-27 LAB
ANION GAP SERPL CALC-SCNC: 11 MMOL/L (ref 10–20)
BUN SERPL-MCNC: 23 MG/DL (ref 6–23)
CALCIUM SERPL-MCNC: 8.7 MG/DL (ref 8.6–10.3)
CHLORIDE SERPL-SCNC: 100 MMOL/L (ref 98–107)
CO2 SERPL-SCNC: 28 MMOL/L (ref 21–32)
CREAT SERPL-MCNC: 0.83 MG/DL (ref 0.5–1.05)
ERYTHROCYTE [DISTWIDTH] IN BLOOD BY AUTOMATED COUNT: 13.6 % (ref 11.5–14.5)
GFR SERPL CREATININE-BSD FRML MDRD: 66 ML/MIN/1.73M*2
GLUCOSE SERPL-MCNC: 79 MG/DL (ref 74–99)
HCT VFR BLD AUTO: 32.2 % (ref 36–46)
HGB BLD-MCNC: 10.1 G/DL (ref 12–16)
MCH RBC QN AUTO: 30.5 PG (ref 26–34)
MCHC RBC AUTO-ENTMCNC: 31.4 G/DL (ref 32–36)
MCV RBC AUTO: 97 FL (ref 80–100)
NRBC BLD-RTO: 0 /100 WBCS (ref 0–0)
PLATELET # BLD AUTO: 257 X10*3/UL (ref 150–450)
POTASSIUM SERPL-SCNC: 4.6 MMOL/L (ref 3.5–5.3)
RBC # BLD AUTO: 3.31 X10*6/UL (ref 4–5.2)
SODIUM SERPL-SCNC: 134 MMOL/L (ref 136–145)
WBC # BLD AUTO: 4.9 X10*3/UL (ref 4.4–11.3)

## 2023-11-27 PROCEDURE — 2500000005 HC RX 250 GENERAL PHARMACY W/O HCPCS: Performed by: INTERNAL MEDICINE

## 2023-11-27 PROCEDURE — 97161 PT EVAL LOW COMPLEX 20 MIN: CPT | Mod: GP

## 2023-11-27 PROCEDURE — 2500000004 HC RX 250 GENERAL PHARMACY W/ HCPCS (ALT 636 FOR OP/ED): Performed by: INTERNAL MEDICINE

## 2023-11-27 PROCEDURE — G0378 HOSPITAL OBSERVATION PER HR: HCPCS

## 2023-11-27 PROCEDURE — 2500000001 HC RX 250 WO HCPCS SELF ADMINISTERED DRUGS (ALT 637 FOR MEDICARE OP): Performed by: NURSE PRACTITIONER

## 2023-11-27 PROCEDURE — 85027 COMPLETE CBC AUTOMATED: CPT | Performed by: NURSE PRACTITIONER

## 2023-11-27 PROCEDURE — 36415 COLL VENOUS BLD VENIPUNCTURE: CPT | Performed by: NURSE PRACTITIONER

## 2023-11-27 PROCEDURE — 96372 THER/PROPH/DIAG INJ SC/IM: CPT | Performed by: NURSE PRACTITIONER

## 2023-11-27 PROCEDURE — 97165 OT EVAL LOW COMPLEX 30 MIN: CPT | Mod: GO

## 2023-11-27 PROCEDURE — 2500000004 HC RX 250 GENERAL PHARMACY W/ HCPCS (ALT 636 FOR OP/ED): Performed by: NURSE PRACTITIONER

## 2023-11-27 PROCEDURE — 80048 BASIC METABOLIC PNL TOTAL CA: CPT | Performed by: NURSE PRACTITIONER

## 2023-11-27 RX ORDER — LIDOCAINE 560 MG/1
1 PATCH PERCUTANEOUS; TOPICAL; TRANSDERMAL DAILY
Status: DISCONTINUED | OUTPATIENT
Start: 2023-11-27 | End: 2023-12-08 | Stop reason: HOSPADM

## 2023-11-27 RX ORDER — DEXAMETHASONE 6 MG/1
6 TABLET ORAL DAILY
Status: DISCONTINUED | OUTPATIENT
Start: 2023-11-27 | End: 2023-12-08 | Stop reason: HOSPADM

## 2023-11-27 RX ADMIN — LIDOCAINE 1 PATCH: 4 PATCH TOPICAL at 16:22

## 2023-11-27 RX ADMIN — ENOXAPARIN SODIUM 30 MG: 30 INJECTION SUBCUTANEOUS at 13:55

## 2023-11-27 RX ADMIN — DEXAMETHASONE 6 MG: 6 TABLET ORAL at 16:22

## 2023-11-27 RX ADMIN — CYANOCOBALAMIN TAB 1000 MCG 1000 MCG: 1000 TAB at 09:23

## 2023-11-27 RX ADMIN — ANASTROZOLE 1 MG: 1 TABLET, COATED ORAL at 12:58

## 2023-11-27 RX ADMIN — Medication 1 TABLET: at 09:23

## 2023-11-27 RX ADMIN — ACETAMINOPHEN 650 MG: 160 SOLUTION ORAL at 04:45

## 2023-11-27 RX ADMIN — ASPIRIN 81 MG: 81 TABLET, COATED ORAL at 09:23

## 2023-11-27 RX ADMIN — LEVOTHYROXINE SODIUM 75 MCG: 0.07 TABLET ORAL at 13:44

## 2023-11-27 RX ADMIN — POLYETHYLENE GLYCOL 3350 17 G: 17 POWDER, FOR SOLUTION ORAL at 09:22

## 2023-11-27 RX ADMIN — CHOLECALCIFEROL TAB 25 MCG (1000 UNIT) 2000 UNITS: 25 TAB at 09:23

## 2023-11-27 RX ADMIN — Medication 1 TABLET: at 20:53

## 2023-11-27 RX ADMIN — CEFTRIAXONE SODIUM 2 G: 2 INJECTION, SOLUTION INTRAVENOUS at 12:58

## 2023-11-27 RX ADMIN — AMLODIPINE BESYLATE 10 MG: 10 TABLET ORAL at 09:23

## 2023-11-27 RX ADMIN — ATORVASTATIN CALCIUM 20 MG: 20 TABLET, FILM COATED ORAL at 09:23

## 2023-11-27 ASSESSMENT — ACTIVITIES OF DAILY LIVING (ADL)
JUDGMENT_ADEQUATE_SAFELY_COMPLETE_DAILY_ACTIVITIES: YES
PATIENT'S MEMORY ADEQUATE TO SAFELY COMPLETE DAILY ACTIVITIES?: YES
GROOMING: NEEDS ASSISTANCE
TOILETING: NEEDS ASSISTANCE
DRESSING YOURSELF: NEEDS ASSISTANCE
ADEQUATE_TO_COMPLETE_ADL: YES
DRESSING YOURSELF: NEEDS ASSISTANCE
HEARING - LEFT EAR: FUNCTIONAL
FEEDING YOURSELF: INDEPENDENT
HEARING - RIGHT EAR: FUNCTIONAL
FEEDING YOURSELF: INDEPENDENT
ADEQUATE_TO_COMPLETE_ADL: YES
WALKS IN HOME: NEEDS ASSISTANCE
HEARING - LEFT EAR: FUNCTIONAL
GROOMING: NEEDS ASSISTANCE
WALKS IN HOME: NEEDS ASSISTANCE
PATIENT'S MEMORY ADEQUATE TO SAFELY COMPLETE DAILY ACTIVITIES?: YES
BATHING: NEEDS ASSISTANCE
JUDGMENT_ADEQUATE_SAFELY_COMPLETE_DAILY_ACTIVITIES: YES
BATHING: NEEDS ASSISTANCE
ASSISTIVE_DEVICE: WALKER
HEARING - RIGHT EAR: FUNCTIONAL
TOILETING: NEEDS ASSISTANCE

## 2023-11-27 ASSESSMENT — COGNITIVE AND FUNCTIONAL STATUS - GENERAL
TOILETING: A LOT
DRESSING REGULAR UPPER BODY CLOTHING: A LITTLE
DAILY ACTIVITIY SCORE: 18
MOBILITY SCORE: 19
MOVING TO AND FROM BED TO CHAIR: A LITTLE
STANDING UP FROM CHAIR USING ARMS: A LITTLE
TOILETING: A LITTLE
DRESSING REGULAR LOWER BODY CLOTHING: A LITTLE
DAILY ACTIVITIY SCORE: 19
DAILY ACTIVITIY SCORE: 14
HELP NEEDED FOR BATHING: A LITTLE
DRESSING REGULAR UPPER BODY CLOTHING: A LITTLE
MOVING FROM LYING ON BACK TO SITTING ON SIDE OF FLAT BED WITH BEDRAILS: A LITTLE
PATIENT BASELINE BEDBOUND: NO
MOVING TO AND FROM BED TO CHAIR: A LOT
PERSONAL GROOMING: A LITTLE
STANDING UP FROM CHAIR USING ARMS: TOTAL
DRESSING REGULAR UPPER BODY CLOTHING: A LOT
PERSONAL GROOMING: A LITTLE
MOVING TO AND FROM BED TO CHAIR: TOTAL
TURNING FROM BACK TO SIDE WHILE IN FLAT BAD: TOTAL
CLIMB 3 TO 5 STEPS WITH RAILING: TOTAL
MOBILITY SCORE: 6
MOBILITY SCORE: 12
WALKING IN HOSPITAL ROOM: A LOT
HELP NEEDED FOR BATHING: A LOT
TOILETING: A LOT
CLIMB 3 TO 5 STEPS WITH RAILING: A LOT
WALKING IN HOSPITAL ROOM: A LITTLE
HELP NEEDED FOR BATHING: A LITTLE
TURNING FROM BACK TO SIDE WHILE IN FLAT BAD: A LOT
DRESSING REGULAR LOWER BODY CLOTHING: TOTAL
WALKING IN HOSPITAL ROOM: TOTAL
DRESSING REGULAR LOWER BODY CLOTHING: A LITTLE
MOVING FROM LYING ON BACK TO SITTING ON SIDE OF FLAT BED WITH BEDRAILS: TOTAL
STANDING UP FROM CHAIR USING ARMS: A LOT
PERSONAL GROOMING: A LITTLE
CLIMB 3 TO 5 STEPS WITH RAILING: TOTAL

## 2023-11-27 ASSESSMENT — PAIN SCALES - GENERAL
PAINLEVEL_OUTOF10: 0 - NO PAIN
PAINLEVEL_OUTOF10: 3

## 2023-11-27 ASSESSMENT — PAIN - FUNCTIONAL ASSESSMENT
PAIN_FUNCTIONAL_ASSESSMENT: 0-10

## 2023-11-27 ASSESSMENT — PAIN DESCRIPTION - DESCRIPTORS: DESCRIPTORS: ACHING

## 2023-11-27 NOTE — PROGRESS NOTES
Regi Leger is a 89 y.o. female on day 0 of admission presenting with UTI (urinary tract infection).      Subjective   Patient fully evaluated on November 27.  Improved but still very weak and probably will require skilled nursing.       Objective     Last Recorded Vitals  /60   Pulse 71   Temp 35.7 °C (96.3 °F)   Resp 18   Wt 51.3 kg (113 lb)   SpO2 97%   Intake/Output last 3 Shifts:  No intake or output data in the 24 hours ending 11/27/23 1554    Admission Weight  Weight: 51.3 kg (113 lb) (11/26/23 0916)    Daily Weight  11/26/23 : 51.3 kg (113 lb)    Image Results  XR chest 1 view  Narrative: Interpreted By:  Prosper Olivas,   STUDY:  XR CHEST 1 VIEW;  11/26/2023 10:54 am      INDICATION:  Signs/Symptoms:Weakness.      COMPARISON:  Portable chest, 7 September 2023      ACCESSION NUMBER(S):  [id number]      ORDERING CLINICIAN:  NICKY SPENCER      TECHNIQUE:  Single frontal view of the chest; Portable technique      FINDINGS:      The cardiomediastinal silhouette is unchanged      No consolidative lung opacity      No edema / failure      No large pleural effusion or demonstrable pneumothorax      Impression: NO ACUTE DISEASE IN THE CHEST      MACRO:  None      Signed by: Prosper Olivas 11/26/2023 11:18 AM  Dictation workstation:   [id number]      Physical Exam    Relevant Results               Assessment/Plan                  Varghese Vasquez MD  Physician  Internal Medicine     H&P      Addendum     Date of Service: 11/26/2023  2:46 PM     Addendum       Expand All Collapse All    History Of Present Illness  Regi Leger is a 89 y.o. female with past medical history of abdominal aortic aneurysm s/p repair (01/2022), endoleak of aortic graft (no intervention pursued per patient's wishes), severe aortic stenosis, CAD s/p PCI/stent RCA, hypertension, hyperlipidemia, hypothyroidism, vocal cord paralysis following thyroidectomy,  CKD stage III, nephrolithiasis, urinary retention, PVD, anemia,  osteoarthritis, DJD, breast cancer, and skin cancer who presented today with weakness.  HPI somewhat limited due to patient is hard of hearing.  She reports over the past few days she has been experiencing progressively worsening weakness.  She reports she became concerned that she may fall so she came to the emergency room for evaluation.  She admits to associated body aches that she has been taking over-the-counter medicine for.  She denies any fevers, chills, chest pain, shortness of breath, cough, abdominal pain, nausea, vomiting, diarrhea, or dysuria, changes in her chronic urinary incontinence.     In the ED lab work, EKG, and chest x-ray were performed. Labs revealed glucose 102, sodium 135 (chronically mildly low), bun 26 (chronic), , troponin 20, glucose 22, chronic anemia noted with red blood cell count 2.44, hemoglobin 10.5, and hematocrit 32.8, neutrophils 5.98, lymphocytes 0.74 UA revealed yellow hazy urine with moderate blood urobilinogen 2, large leuks.  Chest x-ray revealed no acute process.  EKG, per ER physician impression revealed normal sinus rhythm at a rate of 67 with nonspecific changes.  Vital signs were stable while in the ED.  She was given Rocephin and admitted to Dr. Vasquez.     10 point ROS negative except as noted above in HPI     Past medical history: As above  Past surgical history: Thyroidectomy, hysterectomy, PTCA/PCI with stent to RCA, AAA repair, cataract surgery  Social history: No history of smoking, alcohol abuse, or illicit drug use.  Lives with sister and nephew.  Ambulates with walker.  Family history: Father-stomach cancer; mother-myocardial infarction     Allergies  Pravastatin and Penicillins     Physical Exam  Constitutional:       Comments: Thin   HENT:      Head: Normocephalic and atraumatic.      Mouth/Throat:      Mouth: Mucous membranes are dry.   Eyes:      Conjunctiva/sclera: Conjunctivae normal.   Cardiovascular:      Rate and Rhythm: Normal rate and  "regular rhythm.      Heart sounds: Murmur heard.   Pulmonary:      Effort: Pulmonary effort is normal.      Breath sounds: No rales.   Abdominal:      General: Abdomen is flat. Bowel sounds are normal.      Palpations: Abdomen is soft.   Musculoskeletal:         General: Normal range of motion.      Cervical back: Neck supple.   Neurological:      Mental Status: She is alert. Mental status is at baseline.   Psychiatric:         Mood and Affect: Mood normal.            Last Recorded Vitals  Blood pressure (!) 155/97, pulse 77, temperature 36.5 °C (97.7 °F), temperature source Tympanic, resp. rate 25, height 1.727 m (5' 8\"), weight 51.3 kg (113 lb), SpO2 97 %.     Relevant Results     No current facility-administered medications on file prior to encounter.             Current Outpatient Medications on File Prior to Encounter   Medication Sig Dispense Refill    amLODIPine (Norvasc) 10 mg tablet Take 1 tablet (10 mg) by mouth once daily.        anastrozole (Arimidex) 1 mg tablet Take 1 tablet (1 mg total) by mouth once daily.        aspirin 81 mg EC tablet Take 1 tablet (81 mg) by mouth once daily.        cholecalciferol (Vitamin D-3) 50 MCG (2000 UT) tablet Take 1 tablet (2,000 Units) by mouth once daily.        cyanocobalamin (Vitamin B-12) 1,000 mcg tablet Take 1 tablet (1,000 mcg) by mouth once daily.        levothyroxine (Synthroid) 75 mcg tablet Take 1 tablet (75 mcg) by mouth once a day on Monday, Wednesday, and Friday.        atorvastatin (Lipitor) 20 mg tablet Take 1 tablet (20 mg) by mouth once daily.        levothyroxine (Synthroid, Levoxyl) 75 mcg tablet Take 0.5 tablets (37.5 mcg) by mouth once a day on Sunday, Tuesday, Thursday, and Saturday.                Results for orders placed or performed during the hospital encounter of 11/26/23 (from the past 24 hour(s))   CBC and Auto Differential   Result Value Ref Range     WBC 7.6 4.4 - 11.3 x10*3/uL     nRBC 0.0 0.0 - 0.0 /100 WBCs     RBC 3.44 (L) 4.00 - " 5.20 x10*6/uL     Hemoglobin 10.5 (L) 12.0 - 16.0 g/dL     Hematocrit 32.8 (L) 36.0 - 46.0 %     MCV 95 80 - 100 fL     MCH 30.5 26.0 - 34.0 pg     MCHC 32.0 32.0 - 36.0 g/dL     RDW 13.9 11.5 - 14.5 %     Platelets 277 150 - 450 x10*3/uL     Neutrophils % 78.5 40.0 - 80.0 %     Immature Granulocytes %, Automated 0.4 0.0 - 0.9 %     Lymphocytes % 9.7 13.0 - 44.0 %     Monocytes % 8.7 2.0 - 10.0 %     Eosinophils % 2.0 0.0 - 6.0 %     Basophils % 0.7 0.0 - 2.0 %     Neutrophils Absolute 5.98 (H) 1.60 - 5.50 x10*3/uL     Immature Granulocytes Absolute, Automated 0.03 0.00 - 0.50 x10*3/uL     Lymphocytes Absolute 0.74 (L) 0.80 - 3.00 x10*3/uL     Monocytes Absolute 0.66 0.05 - 0.80 x10*3/uL     Eosinophils Absolute 0.15 0.00 - 0.40 x10*3/uL     Basophils Absolute 0.05 0.00 - 0.10 x10*3/uL   Comprehensive metabolic panel   Result Value Ref Range     Glucose 102 (H) 74 - 99 mg/dL     Sodium 135 (L) 136 - 145 mmol/L     Potassium 4.5 3.5 - 5.3 mmol/L     Chloride 99 98 - 107 mmol/L     Bicarbonate 29 21 - 32 mmol/L     Anion Gap 12 10 - 20 mmol/L     Urea Nitrogen 26 (H) 6 - 23 mg/dL     Creatinine 0.91 0.50 - 1.05 mg/dL     eGFR 59 (L) >60 mL/min/1.73m*2     Calcium 9.3 8.6 - 10.3 mg/dL     Albumin 3.8 3.4 - 5.0 g/dL     Alkaline Phosphatase 73 33 - 136 U/L     Total Protein 7.3 6.4 - 8.2 g/dL     AST 20 9 - 39 U/L     Bilirubin, Total 0.4 0.0 - 1.2 mg/dL     ALT 11 7 - 45 U/L   Troponin I, High Sensitivity   Result Value Ref Range     Troponin I, High Sensitivity 20 (H) 0 - 13 ng/L   B-Type Natriuretic Peptide   Result Value Ref Range      (H) 0 - 99 pg/mL   Urinalysis with Reflex Microscopic and Culture   Result Value Ref Range     Color, Urine Yellow Straw, Yellow     Appearance, Urine Hazy (N) Clear     Specific Gravity, Urine 1.015 1.005 - 1.035     pH, Urine 5.0 5.0, 5.5, 6.0, 6.5, 7.0, 7.5, 8.0     Protein, Urine NEGATIVE NEGATIVE mg/dL     Glucose, Urine NEGATIVE NEGATIVE mg/dL     Blood, Urine MODERATE  (2+) (A) NEGATIVE     Ketones, Urine NEGATIVE NEGATIVE mg/dL     Bilirubin, Urine NEGATIVE NEGATIVE     Urobilinogen, Urine 2.0 (N) <2.0 mg/dL     Nitrite, Urine NEGATIVE NEGATIVE     Leukocyte Esterase, Urine LARGE (3+) (A) NEGATIVE   Extra Urine Gray Tube   Result Value Ref Range     Extra Tube Hold for add-ons.     Microscopic Only, Urine   Result Value Ref Range     WBC, Urine 21-50 (A) 1-5, NONE /HPF     RBC, Urine 3-5 NONE, 1-2, 3-5 /HPF     Squamous Epithelial Cells, Urine 1-9 (SPARSE) Reference range not established. /HPF     Bacteria, Urine 1+ (A) NONE SEEN /HPF         XR chest 1 view     Result Date: 11/26/2023  Interpreted By:  Prosper Olivas, STUDY: XR CHEST 1 VIEW;  11/26/2023 10:54 am   INDICATION: Signs/Symptoms:Weakness.   COMPARISON: Portable chest, 7 September 2023   ACCESSION NUMBER(S): [id number]   ORDERING CLINICIAN: NICKY SPENCER   TECHNIQUE: Single frontal view of the chest; Portable technique   FINDINGS:   The cardiomediastinal silhouette is unchanged   No consolidative lung opacity   No edema / failure   No large pleural effusion or demonstrable pneumothorax        NO ACUTE DISEASE IN THE CHEST   MACRO: None   Signed by: Prosper Olivas 11/26/2023 11:18 AM Dictation workstation:   [id number]          Assessment/Plan   Principal Problem:    UTI (urinary tract infection)  Body Aches   Elevated troponin  Weakness     Admit to F  Observation  Continue rocephin  urine culture pending      CBC, BMP in am  PT/OT  Repeat troponin and EKG     Chronic conditions: Hypertension, hyperlipidemia, hypothyroidism, coronary artery disease, PVD, breast cancer     Continue home medications as listed above     DVT prophylaxis     SCDs  Lovenox           I spent 45 minutes in the professional and overall care of this patient.     Patient fully evaluated plan as above.  Harmony Estrada, APRN-CNP                 Revision History    Patient fully evaluated on November 27 and Decadron orally added for myalgias.   Await urine culture continue IV Rocephin.  Recheck labs in AM.  Lidoderm patch to sacrum for pain      Principal Problem:    UTI (urinary tract infection)                  Varghese Vasquez MD

## 2023-11-27 NOTE — CARE PLAN
The patient's goals for the shift include  to have a decrease in weakness    The clinical goals for the shift include  pt will not fall     Over the shift, the patient did not make progress toward the following goals. Barriers to progression include pt still weak. Recommendations to address these barriers include encourage activity.

## 2023-11-27 NOTE — PROGRESS NOTES
Physical Therapy    [facility]    Physical Therapy Evaluation    Patient Name: Regi Leger  MRN: [id number]  Today's Date: 11/27/2023   Time Calculation  Start Time: 1049  Stop Time: 1112  Time Calculation (min): 23 min    Assessment/Plan   PT Assessment  PT Assessment Results: Decreased strength, Decreased range of motion, Decreased endurance, Impaired balance, Decreased mobility, Impaired hearing, Pain  Rehab Prognosis: Good  End of Session Communication: Bedside nurse  End of Session Patient Position: Bed, 2 rail up, Alarm on  IP OR SWING BED PT PLAN  Inpatient or Swing Bed: Inpatient  PT Plan  Treatment/Interventions: Bed mobility, Transfer training, Gait training, Strengthening  PT Plan: Skilled PT  PT Frequency: 3 times per week  PT Discharge Recommendations: Moderate intensity level of continued care  PT - OK to Discharge: Yes    Subjective     Current Problem:  Patient Active Problem List   Diagnosis    UTI (urinary tract infection)       General Visit Information:  General  Reason for Referral:  (+covid , gen weakness,)  Past Medical History Relevant to Rehab:  (mi, renal calculi, aaa s/p reapir, endo aortic graft, throidectomy, ckd, urinfary retention, pvd, anemia, oa, djd, breast skin ca, [place])  Prior to Session Communication: Bedside nurse  Patient Position Received: Bed, 2 rail up, Alarm on    Home Living:  Home Living  Type of Home:  (discharge pain is to sty w/ sister and nephew, in 1 story home w/ ramp + 1 step to enter)  Home Adaptive Equipment:  (indep w/ rollator, w/c is available)      Precautions:  Precautions  Precautions Comment:  (covid+, [place], oob)      Objective     Pain:  Pain Assessment  Pain Interventions:  (leg 5/10, tailbone 8-9/10)    Cognition:  Cognition  Overall Cognitive Status: Within Functional Limits    General Assessments:      Activity Tolerance  Endurance:  (fair)             Functional Assessments:     Bed Mobility  Bed Mobility:   (sba)  Transfers  Transfer:  (max of 2, unable to stand upright onto rw)  Ambulation/Gait Training  Ambulation/Gait Training Performed:  (unable)          Extremity/Trunk Assessments:        RLE   RLE :  (3+, 4-/5)  LLE   LLE :  (3+/4-/5)    Outcome Measures:  [facility] Basic Mobility  Turning from your back to your side while in a flat bed without using bedrails: Total  Moving from lying on your back to sitting on the side of a flat bed without using bedrails: Total  Moving to and from bed to chair (including a wheelchair): Total  Standing up from a chair using your arms (e.g. wheelchair or bedside chair): Total  To walk in hospital room: Total  Climbing 3-5 steps with railing: Total  Basic Mobility - Total Score: 6                            Goals:  Encounter Problems       Encounter Problems (Active)       PT Problem       PT Goal 1 (Progressing)       Start:  11/27/23    Expected End:  12/11/23       Indep bed mob          PT Goal 2 (Progressing)       Start:  11/27/23    Expected End:  12/11/23       Min of 1 txs         PT Goal 3 (Progressing)       Start:  11/27/23    Expected End:  12/11/23       Min of 1 amb w. Rw 40ft x3 laps in room          PT Goal 4 (Progressing)       Start:  11/27/23    Expected End:  12/11/23       20-30 reps ble there ex              Education Documentation  Mobility Training, taught by Madhuri Mcneil, PT at 11/27/2023  1:19 PM.  Learner: Patient  Readiness: Acceptance  Method: Explanation  Response: Verbalizes Understanding    Education Comments  No comments found.

## 2023-11-27 NOTE — PROGRESS NOTES
Occupational Therapy    Evaluation    Patient Name: Regi Leger  MRN: [id number]  Today's Date: 11/27/2023  Time Calculation  Start Time: 1048  Stop Time: 1110  Time Calculation (min): 22 min    Assessment  IP OT Assessment  End of Session Communication: Bedside nurse  End of Session Patient Position: Bed, 2 rail up, Alarm on  Plan:  OT Frequency: 3 times per week  OT Discharge Recommendations: Moderate intensity level of continued care    Subjective   Current Problem:  1. Generalized weakness        2. Urinary tract infection without hematuria, site unspecified          General:  General  Reason for Referral: OT eval/tx, impaired functional daily living skills (+covid , gen weakness,)  Past Medical History Relevant to Rehab: mi, renal calculi, aaa s/p reapir, endo aortic graft, throidectomy, ckd, urinfary retention, pvd, anemia, oa, djd, breast skin ca, [place] Pt recently discharged from nursing home for skilled stay. pt d/c on Sat 11/18 and decompensated by Wed 11/22. one month prior, pt was indep living witrh sister at her home- bathing/dressing/cooking/cleaning.Sister is now ill and is residing with her son. Pt plans to live with nephew and sister upon discharge.Nephews home: ramp, 1  entry step  with a rail , tub/shwoer combination no chair/rails. elevated toliet /rails. nephew drives (mi, renal calculi, aaa s/p reapir, endo aortic graft, throidectomy, ckd, urinfary retention, pvd, anemia, oa, djd, breast skin ca, [place])  Prior to Session Communication: Bedside nurse  Patient Position Received: Bed, 2 rail up, Alarm on  Precautions:  Precautions Comment: COVID [place],OOB (covid+, [place], oob)       Pain:  Pain Assessment  Pain Score: 0 - No pain    Objective   Cognition:  Overall Cognitive Status: Within Functional Limits           Home Living:  Type of Home:  (discharge pain is to sty w/ sister and nephew, in 1 story home w/ ramp + 1 step to enter)  Home Adaptive Equipment:  (indep w/ rollator, w/c is available)         Activity Tolerance:  Endurance:  (fair)  Bed Mobility/Transfers: Bed Mobility  Bed Mobility:  (sba)    Transfers  Transfer:  (max of 2, unable to stand upright onto rw) -partial standing without upright posturing . unable to take steps      Vision:   wfl for age and life style    Sensation:  Sensation Comment: wfl  Strength: strength below elbow wfl         Hand Function:right     Extremities: RUE   RUE : Within Functional Limits and LUE   LUE: Within Functional Limits    Outcome Measures: [facility] Daily Activity  Putting on and taking off regular lower body clothing: Total  Bathing (including washing, rinsing, drying): A lot  Putting on and taking off regular upper body clothing: A lot  Toileting, which includes using toilet, bedpan or urinal: A lot  Taking care of personal grooming such as brushing teeth: A little  Eating Meals: None  Daily Activity - Total Score: 14      Education Documentation  Body Mechanics, taught by Krystina Steve OT at 11/27/2023  2:54 PM.  Learner: Patient  Readiness: Acceptance  Method: Demonstration  Response: Demonstrated Understanding, Needs Reinforcement    Precautions, taught by Krystina Steve OT at 11/27/2023  2:54 PM.  Learner: Patient  Readiness: Acceptance  Method: Demonstration  Response: Demonstrated Understanding, Needs Reinforcement    ADL Training, taught by Krystina Steve OT at 11/27/2023  2:54 PM.  Learner: Patient  Readiness: Acceptance  Method: Demonstration  Response: Demonstrated Understanding, Needs Reinforcement    Education Comments  No comments found.      Goals:   Encounter Problems       Encounter Problems (Active)       OT Goals       OT Goal 1       Start:  11/27/23            OT Goal 2       Start:  11/27/23

## 2023-11-27 NOTE — PROGRESS NOTES
Met with the patient in the room, noted that her AMPAC is 6, will need skilled care. Per the patient, she normally lives at home with her sister. She states that she was in [facility], was discharged in November. She is unsure if her stay will be covered under her Medicare as she was there for about 2 months. She states that she is agreeable to going back there. Later she called again, states that she spoke to her nephew who feels [facility] would be a better choice. She is agreeable to a referral to [facility] as her first choice and [facility] as her second choice. Will request PCN to make referrals.

## 2023-11-27 NOTE — SIGNIFICANT EVENT
Baby Care    SIDS Prevention: Healthy infants without medical conditions should be placed on their backs for sleeping, without extra pillows and blankets.  Feedings/Breast: Feed your baby 8-10 times in 24 hours.  Some babies nurse more often. Allow the baby to feed for as long as desired.  Many babies feed from only one breast at a time during the first few days. Avoid pacifiers and artificial nipples for at least 3-4 weeks.    Cord Care: The cord will fall off in one to four weeks.  Clean the base of the cord with alcohol at least once a day or with diaper changes if there is drainage.  Do not submerge the baby in tub water until cord falls off.    Diaper Changes:  Always wipe from the front to the back.  Girls may have a vaginal discharge (either mucous or bloody).  Baby will have at least one wet diaper for each day old he/she is until the sixth day when he/she will have about 6-8 wet diapers a day.  As your baby begins to feed, the stools will change from greenish black stools to brown-green and then to a yellow.  Stools/:  babies should have 3 or more transitional to yellow, seedy stools and 6 or more wet diapers by day 4 to 5.    Bathing: Bathe your baby in a clean area free of draft.  Use a mild soap.  Use lotions and creams sparingly.  Avoid powder and oils.  Safety: The use of car seats and seat restraints is mandatory in the [facility].  Follow infant abduction prevention guidelines.  PKU/Hearing Screen: These are tests required by law that will be done prior to discharge and will identify potential hearing loss and disorders in the  which, if not found and treated early, could lead to mental retardation and serious illness.    CALL YOUR PEDIATRICIAN IF YOUR BABY HAS:     *Temperature less than 97.0 or greater than 100.0 degrees F     *Redness, swelling, foul odor or drainage from cord      *Vomiting or Diarrhea     *No stool within 48 hour of feeding     *Refuses to eat  Would like to have a DNR has not done so yet    more than one feeding     *(If Breastfeeding) less than 2 wet diapers and 2 stools/day after 3 days old     *Skin looks yellow     *Any behavior that worries you    CALL 911 if your baby looks grey or blue.

## 2023-11-27 NOTE — CONSULTS
"Nutrition Note  Reason for Assessment  Reason for Assessment: Provider consult order      Recommendation(s):  Recommend a speech therapy eval to help patient pick a soft diet that may not be so strict. Sending Ensure plus high protein X 2 to help meet nutritional needs.        Assessment    Subjective Data  Food and Nutrient History: Pt has been eating close to 100% of her meals since admit.  She asked for a softer diet and she was initiated on minced moist.  She may benefit from a speech eval.  SLP  may be able to provide a softer diet that is not so strict.  She was a patient of mine in Sept 2023.  She is up 11.7 kbs since then.  10%      Difficulty chewing: Pt requested a soft diet and a minced moist diet was chosen.      Objective Data  Per Flowsheet Percent Meal intake:    Dietary Orders (From admission, onward)       Start     Ordered    11/26/23 1453  Adult diet Regular; Minced/moist food 5  Diet effective now        Comments: Pt requests sot diet   Question Answer Comment   Diet type Regular    Texture Minced/moist food 5        11/26/23 1452                     Results from last 7 days   Lab Units 11/27/23  0639 11/26/23  1059   GLUCOSE mg/dL 79 102*   SODIUM mmol/L 134* 135*   POTASSIUM mmol/L 4.6 4.5   CHLORIDE mmol/L 100 99   CO2 mmol/L 28 29   BUN mg/dL 23 26*   CREATININE mg/dL 0.83 0.91   EGFR mL/min/1.73m*2 66 59*   CALCIUM mg/dL 8.7 9.3     Lab Results   Component Value Date    HGBA1C 5.0 10/12/2023    HGBA1C 5.5 04/16/2020         GI per flowsheet:  Gastrointestinal  Gastrointestinal (WDL): Within Defined Limits  Last bowel movement documented:    PMH: AAA; aortic stenosis; CAD: PCI: Stent; HTN: hypothyroid; vocal cord paralysis; thyroidectomy; CKD:  PVD: anemia; OA; breast cancer; [place]  Allergies: Pravastatin and Penicillins     Anthropometrics:  Height: 175.3 cm (5' 9.02\")  Weight: 51.3 kg (113 lb 1.5 oz)  BMI (Calculated): 16.69  IBW: 66 kg  %IBW: 78 %                Estimated Nutritional " Needs:  Method for Estimating Needs: 4404-1174   MSJ    Method for Estimating Needs: 66-79   1-1.2 gm kg of IBW    Method for Estimating Needs: 6130-3618  as medically indicated   20-30 ml kg of IBW    Nutrition Focused Physical Findings:   Orbital Fat Pads: Severe (dark circles, hollowing and loose skin)  Buccal Fat Pads: Severe (hollow, sunken and narrow face)  Triceps: Severe (negligible fat tissue)  Ribs: Severe (protruding prominent clavicle)        Pain Score: 0 - No pain     Nutrition Diagnosis   Patient has Malnutrition Diagnosis: Yes  Diagnosis Status: New  Malnutrition Diagnosis: Severe malnutrition related to starvation  As Evidenced by: Severe muscle and fat loss noted on physical exam.  Pt is 78% of her IBW with a BMI of 16.7          Plan    Interventions        Individualized Nutrition Prescription Provided for : Recommend a speech therapy eval to help patient pick a soft diet that may not be so strict.  Sending Ensure plus high protein X 2 to help meet nutritional needs.    Nutrition Monitoring and Evaluation   Biochemical Data, Medical Tests and Procedures  Monitoring and Evaluation Plan: Electrolyte/renal panel, Glucose/endocrine profile  Food/Nutrient Related History Monitoring  Monitoring and Evaluation Plan: Energy intake, Fluid intake, Amount of food    Progress towards goals: Partially Met    Education Documentation  No documentation found.      Time Spent (min): 45 minutes  Last Date of Nutrition Visit: 11/27/23  Nutrition Follow-Up Needed?: 3-5 days  Follow up Comment: 12/1  MARIBEL

## 2023-11-27 NOTE — PROGRESS NOTES
11/27/23 1459   Discharge Planning   Patient expects to be discharged to: SNF   Does the patient need discharge transport arranged? Yes   RoundTrip coordination needed? Yes   Has discharge transport been arranged? Yes     1459:  Notified by [facility].  Referrals will be submitted for review.    1800:  [facility]. [facility] no response yet [facility]. [facility] can accept.

## 2023-11-28 ENCOUNTER — APPOINTMENT (OUTPATIENT)
Dept: RADIOLOGY | Facility: HOSPITAL | Age: 88
DRG: 689 | End: 2023-11-28
Payer: MEDICARE

## 2023-11-28 PROBLEM — R53.1 GENERALIZED WEAKNESS: Status: ACTIVE | Noted: 2023-11-28

## 2023-11-28 LAB
ALBUMIN SERPL BCP-MCNC: 3.2 G/DL (ref 3.4–5)
ALP SERPL-CCNC: 62 U/L (ref 33–136)
ALT SERPL W P-5'-P-CCNC: 10 U/L (ref 7–45)
ANION GAP SERPL CALC-SCNC: 14 MMOL/L (ref 10–20)
AST SERPL W P-5'-P-CCNC: 19 U/L (ref 9–39)
BILIRUB SERPL-MCNC: 0.3 MG/DL (ref 0–1.2)
BUN SERPL-MCNC: 27 MG/DL (ref 6–23)
CALCIUM SERPL-MCNC: 9 MG/DL (ref 8.6–10.3)
CHLORIDE SERPL-SCNC: 100 MMOL/L (ref 98–107)
CO2 SERPL-SCNC: 23 MMOL/L (ref 21–32)
CREAT SERPL-MCNC: 0.84 MG/DL (ref 0.5–1.05)
ERYTHROCYTE [DISTWIDTH] IN BLOOD BY AUTOMATED COUNT: 13.5 % (ref 11.5–14.5)
GFR SERPL CREATININE-BSD FRML MDRD: 65 ML/MIN/1.73M*2
GLUCOSE SERPL-MCNC: 126 MG/DL (ref 74–99)
HCT VFR BLD AUTO: 33 % (ref 36–46)
HGB BLD-MCNC: 10 G/DL (ref 12–16)
MCH RBC QN AUTO: 30.4 PG (ref 26–34)
MCHC RBC AUTO-ENTMCNC: 30.3 G/DL (ref 32–36)
MCV RBC AUTO: 100 FL (ref 80–100)
NRBC BLD-RTO: 0 /100 WBCS (ref 0–0)
PLATELET # BLD AUTO: 231 X10*3/UL (ref 150–450)
POTASSIUM SERPL-SCNC: 5.4 MMOL/L (ref 3.5–5.3)
PROT SERPL-MCNC: 6 G/DL (ref 6.4–8.2)
RBC # BLD AUTO: 3.29 X10*6/UL (ref 4–5.2)
SODIUM SERPL-SCNC: 132 MMOL/L (ref 136–145)
WBC # BLD AUTO: 3.4 X10*3/UL (ref 4.4–11.3)

## 2023-11-28 PROCEDURE — 80053 COMPREHEN METABOLIC PANEL: CPT | Performed by: INTERNAL MEDICINE

## 2023-11-28 PROCEDURE — 96372 THER/PROPH/DIAG INJ SC/IM: CPT | Performed by: NURSE PRACTITIONER

## 2023-11-28 PROCEDURE — 72220 X-RAY EXAM SACRUM TAILBONE: CPT | Performed by: RADIOLOGY

## 2023-11-28 PROCEDURE — 2500000001 HC RX 250 WO HCPCS SELF ADMINISTERED DRUGS (ALT 637 FOR MEDICARE OP): Performed by: INTERNAL MEDICINE

## 2023-11-28 PROCEDURE — 2500000004 HC RX 250 GENERAL PHARMACY W/ HCPCS (ALT 636 FOR OP/ED): Performed by: NURSE PRACTITIONER

## 2023-11-28 PROCEDURE — 72220 X-RAY EXAM SACRUM TAILBONE: CPT | Mod: FY

## 2023-11-28 PROCEDURE — 2500000004 HC RX 250 GENERAL PHARMACY W/ HCPCS (ALT 636 FOR OP/ED): Performed by: INTERNAL MEDICINE

## 2023-11-28 PROCEDURE — 36415 COLL VENOUS BLD VENIPUNCTURE: CPT | Performed by: INTERNAL MEDICINE

## 2023-11-28 PROCEDURE — 1100000001 HC PRIVATE ROOM DAILY

## 2023-11-28 PROCEDURE — 2500000001 HC RX 250 WO HCPCS SELF ADMINISTERED DRUGS (ALT 637 FOR MEDICARE OP): Performed by: NURSE PRACTITIONER

## 2023-11-28 PROCEDURE — 85027 COMPLETE CBC AUTOMATED: CPT | Performed by: INTERNAL MEDICINE

## 2023-11-28 PROCEDURE — 2500000005 HC RX 250 GENERAL PHARMACY W/O HCPCS: Performed by: INTERNAL MEDICINE

## 2023-11-28 RX ORDER — CEFUROXIME AXETIL 250 MG/1
250 TABLET ORAL 2 TIMES DAILY
Status: DISCONTINUED | OUTPATIENT
Start: 2023-11-28 | End: 2023-11-29

## 2023-11-28 RX ADMIN — AMLODIPINE BESYLATE 10 MG: 10 TABLET ORAL at 11:18

## 2023-11-28 RX ADMIN — LIDOCAINE 1 PATCH: 4 PATCH TOPICAL at 11:17

## 2023-11-28 RX ADMIN — DEXAMETHASONE 6 MG: 6 TABLET ORAL at 11:19

## 2023-11-28 RX ADMIN — ANASTROZOLE 1 MG: 1 TABLET, COATED ORAL at 11:20

## 2023-11-28 RX ADMIN — ASPIRIN 81 MG: 81 TABLET, COATED ORAL at 11:18

## 2023-11-28 RX ADMIN — Medication 1 TABLET: at 20:38

## 2023-11-28 RX ADMIN — CEFUROXIME AXETIL 250 MG: 250 TABLET, FILM COATED ORAL at 20:38

## 2023-11-28 RX ADMIN — Medication 1 TABLET: at 11:17

## 2023-11-28 RX ADMIN — ATORVASTATIN CALCIUM 20 MG: 20 TABLET, FILM COATED ORAL at 11:20

## 2023-11-28 RX ADMIN — ENOXAPARIN SODIUM 30 MG: 30 INJECTION SUBCUTANEOUS at 14:42

## 2023-11-28 RX ADMIN — CYANOCOBALAMIN TAB 1000 MCG 1000 MCG: 1000 TAB at 11:18

## 2023-11-28 RX ADMIN — POLYETHYLENE GLYCOL 3350 17 G: 17 POWDER, FOR SOLUTION ORAL at 09:00

## 2023-11-28 RX ADMIN — CHOLECALCIFEROL TAB 25 MCG (1000 UNIT) 2000 UNITS: 25 TAB at 11:18

## 2023-11-28 RX ADMIN — CEFUROXIME AXETIL 250 MG: 250 TABLET, FILM COATED ORAL at 17:44

## 2023-11-28 ASSESSMENT — COGNITIVE AND FUNCTIONAL STATUS - GENERAL
WALKING IN HOSPITAL ROOM: A LITTLE
MOVING TO AND FROM BED TO CHAIR: A LITTLE
MOBILITY SCORE: 19
STANDING UP FROM CHAIR USING ARMS: A LITTLE
CLIMB 3 TO 5 STEPS WITH RAILING: A LOT

## 2023-11-28 ASSESSMENT — PAIN - FUNCTIONAL ASSESSMENT
PAIN_FUNCTIONAL_ASSESSMENT: 0-10
PAIN_FUNCTIONAL_ASSESSMENT: 0-10

## 2023-11-28 ASSESSMENT — PAIN SCALES - GENERAL
PAINLEVEL_OUTOF10: 0 - NO PAIN
PAINLEVEL_OUTOF10: 0 - NO PAIN

## 2023-11-28 NOTE — PROGRESS NOTES
SW received consult from assigned floor RN to speak with pt. SW met with pt in pt room with appropriate PPE. Pt explained that she has been having trouble regaining strength even after a 2 month stay at a Skilled facility. Pt explained that she wants to get stronger and healthier. SW recommended that patient continue to work on regaining strength and ask RNs to help her get out of bed throughout the day along with participating in PT/OT when they visit. Pt interested I'm going to a SNF again but is worried she won't have anymore Skilled days available with insurance. Pt explained that she does not want to go to her previous facility but wants to go to [facility] if able. Pt interested in prices for private pay if no more skilled days available. SW will look into and notify TCC-RN Margret. WENDIE updated Margret after pt visit about pt concerns and questions. CT team will follow up.    JOHN WRIGHT

## 2023-11-28 NOTE — CARE PLAN
The patient's goals for the shift include  have no pain this shift     The clinical goals for the shift include patient will be free from injury or pain

## 2023-11-28 NOTE — PROGRESS NOTES
11/28/23 1148   Discharge Planning   Home or Post Acute Services Post acute facilities (Rehab/SNF/etc)   Type of Post Acute Facility Services Skilled nursing   Patient expects to be discharged to: SNF   Does the patient need discharge transport arranged? Yes   RoundTrip coordination needed? Yes     1148:  Received response from second preference [facility] accept.  Waiting on response from first preference [facility].  Will need 3 midnight inpatient stay to admit [facility].    1726:  Received response from [facility] not able to accept

## 2023-11-28 NOTE — PROGRESS NOTES
Regi Leger is a 89 y.o. female on day 0 of admission presenting with UTI (urinary tract infection).      Subjective   Patient fully evaluated on November 27.  Improved but still very weak and probably will require skilled nursing.       Objective     Last Recorded Vitals  /62   Pulse 66   Temp 36.2 °C (97.2 °F)   Resp 18   Wt 51.3 kg (113 lb 1.5 oz)   SpO2 96%   Intake/Output last 3 Shifts:  No intake or output data in the 24 hours ending 11/28/23 1225    Admission Weight  Weight: 51.3 kg (113 lb) (11/26/23 0916)    Daily Weight  11/27/23 : 51.3 kg (113 lb 1.5 oz)    Image Results  XR chest 1 view  Narrative: Interpreted By:  Prosper Olivas,   STUDY:  XR CHEST 1 VIEW;  11/26/2023 10:54 am      INDICATION:  Signs/Symptoms:Weakness.      COMPARISON:  Portable chest, 7 September 2023      ACCESSION NUMBER(S):  [id number]      ORDERING CLINICIAN:  NICKY SPENCER      TECHNIQUE:  Single frontal view of the chest; Portable technique      FINDINGS:      The cardiomediastinal silhouette is unchanged      No consolidative lung opacity      No edema / failure      No large pleural effusion or demonstrable pneumothorax      Impression: NO ACUTE DISEASE IN THE CHEST      MACRO:  None      Signed by: Prosper Olivas 11/26/2023 11:18 AM  Dictation workstation:   [id number]      Physical Exam    Relevant Results               Assessment/Plan                  Varghese Vasquez MD  Physician  Internal Medicine     H&P      Addendum     Date of Service: 11/26/2023  2:46 PM     Addendum       Expand All Collapse All    History Of Present Illness  Regi Leger is a 89 y.o. female with past medical history of abdominal aortic aneurysm s/p repair (01/2022), endoleak of aortic graft (no intervention pursued per patient's wishes), severe aortic stenosis, CAD s/p PCI/stent RCA, hypertension, hyperlipidemia, hypothyroidism, vocal cord paralysis following thyroidectomy,  CKD stage III, nephrolithiasis, urinary retention, PVD,  anemia, osteoarthritis, DJD, breast cancer, and skin cancer who presented today with weakness.  HPI somewhat limited due to patient is hard of hearing.  She reports over the past few days she has been experiencing progressively worsening weakness.  She reports she became concerned that she may fall so she came to the emergency room for evaluation.  She admits to associated body aches that she has been taking over-the-counter medicine for.  She denies any fevers, chills, chest pain, shortness of breath, cough, abdominal pain, nausea, vomiting, diarrhea, or dysuria, changes in her chronic urinary incontinence.     In the ED lab work, EKG, and chest x-ray were performed. Labs revealed glucose 102, sodium 135 (chronically mildly low), bun 26 (chronic), , troponin 20, glucose 22, chronic anemia noted with red blood cell count 2.44, hemoglobin 10.5, and hematocrit 32.8, neutrophils 5.98, lymphocytes 0.74 UA revealed yellow hazy urine with moderate blood urobilinogen 2, large leuks.  Chest x-ray revealed no acute process.  EKG, per ER physician impression revealed normal sinus rhythm at a rate of 67 with nonspecific changes.  Vital signs were stable while in the ED.  She was given Rocephin and admitted to Dr. Vasquez.     10 point ROS negative except as noted above in HPI     Past medical history: As above  Past surgical history: Thyroidectomy, hysterectomy, PTCA/PCI with stent to RCA, AAA repair, cataract surgery  Social history: No history of smoking, alcohol abuse, or illicit drug use.  Lives with sister and nephew.  Ambulates with walker.  Family history: Father-stomach cancer; mother-myocardial infarction     Allergies  Pravastatin and Penicillins     Physical Exam  Constitutional:       Comments: Thin   HENT:      Head: Normocephalic and atraumatic.      Mouth/Throat:      Mouth: Mucous membranes are dry.   Eyes:      Conjunctiva/sclera: Conjunctivae normal.   Cardiovascular:      Rate and Rhythm: Normal rate  "and regular rhythm.      Heart sounds: Murmur heard.   Pulmonary:      Effort: Pulmonary effort is normal.      Breath sounds: No rales.   Abdominal:      General: Abdomen is flat. Bowel sounds are normal.      Palpations: Abdomen is soft.   Musculoskeletal:         General: Normal range of motion.      Cervical back: Neck supple.   Neurological:      Mental Status: She is alert. Mental status is at baseline.   Psychiatric:         Mood and Affect: Mood normal.            Last Recorded Vitals  Blood pressure (!) 155/97, pulse 77, temperature 36.5 °C (97.7 °F), temperature source Tympanic, resp. rate 25, height 1.727 m (5' 8\"), weight 51.3 kg (113 lb), SpO2 97 %.     Relevant Results     No current facility-administered medications on file prior to encounter.             Current Outpatient Medications on File Prior to Encounter   Medication Sig Dispense Refill    amLODIPine (Norvasc) 10 mg tablet Take 1 tablet (10 mg) by mouth once daily.        anastrozole (Arimidex) 1 mg tablet Take 1 tablet (1 mg total) by mouth once daily.        aspirin 81 mg EC tablet Take 1 tablet (81 mg) by mouth once daily.        cholecalciferol (Vitamin D-3) 50 MCG (2000 UT) tablet Take 1 tablet (2,000 Units) by mouth once daily.        cyanocobalamin (Vitamin B-12) 1,000 mcg tablet Take 1 tablet (1,000 mcg) by mouth once daily.        levothyroxine (Synthroid) 75 mcg tablet Take 1 tablet (75 mcg) by mouth once a day on Monday, Wednesday, and Friday.        atorvastatin (Lipitor) 20 mg tablet Take 1 tablet (20 mg) by mouth once daily.        levothyroxine (Synthroid, Levoxyl) 75 mcg tablet Take 0.5 tablets (37.5 mcg) by mouth once a day on Sunday, Tuesday, Thursday, and Saturday.                Results for orders placed or performed during the hospital encounter of 11/26/23 (from the past 24 hour(s))   CBC and Auto Differential   Result Value Ref Range     WBC 7.6 4.4 - 11.3 x10*3/uL     nRBC 0.0 0.0 - 0.0 /100 WBCs     RBC 3.44 (L) 4.00 - " 5.20 x10*6/uL     Hemoglobin 10.5 (L) 12.0 - 16.0 g/dL     Hematocrit 32.8 (L) 36.0 - 46.0 %     MCV 95 80 - 100 fL     MCH 30.5 26.0 - 34.0 pg     MCHC 32.0 32.0 - 36.0 g/dL     RDW 13.9 11.5 - 14.5 %     Platelets 277 150 - 450 x10*3/uL     Neutrophils % 78.5 40.0 - 80.0 %     Immature Granulocytes %, Automated 0.4 0.0 - 0.9 %     Lymphocytes % 9.7 13.0 - 44.0 %     Monocytes % 8.7 2.0 - 10.0 %     Eosinophils % 2.0 0.0 - 6.0 %     Basophils % 0.7 0.0 - 2.0 %     Neutrophils Absolute 5.98 (H) 1.60 - 5.50 x10*3/uL     Immature Granulocytes Absolute, Automated 0.03 0.00 - 0.50 x10*3/uL     Lymphocytes Absolute 0.74 (L) 0.80 - 3.00 x10*3/uL     Monocytes Absolute 0.66 0.05 - 0.80 x10*3/uL     Eosinophils Absolute 0.15 0.00 - 0.40 x10*3/uL     Basophils Absolute 0.05 0.00 - 0.10 x10*3/uL   Comprehensive metabolic panel   Result Value Ref Range     Glucose 102 (H) 74 - 99 mg/dL     Sodium 135 (L) 136 - 145 mmol/L     Potassium 4.5 3.5 - 5.3 mmol/L     Chloride 99 98 - 107 mmol/L     Bicarbonate 29 21 - 32 mmol/L     Anion Gap 12 10 - 20 mmol/L     Urea Nitrogen 26 (H) 6 - 23 mg/dL     Creatinine 0.91 0.50 - 1.05 mg/dL     eGFR 59 (L) >60 mL/min/1.73m*2     Calcium 9.3 8.6 - 10.3 mg/dL     Albumin 3.8 3.4 - 5.0 g/dL     Alkaline Phosphatase 73 33 - 136 U/L     Total Protein 7.3 6.4 - 8.2 g/dL     AST 20 9 - 39 U/L     Bilirubin, Total 0.4 0.0 - 1.2 mg/dL     ALT 11 7 - 45 U/L   Troponin I, High Sensitivity   Result Value Ref Range     Troponin I, High Sensitivity 20 (H) 0 - 13 ng/L   B-Type Natriuretic Peptide   Result Value Ref Range      (H) 0 - 99 pg/mL   Urinalysis with Reflex Microscopic and Culture   Result Value Ref Range     Color, Urine Yellow Straw, Yellow     Appearance, Urine Hazy (N) Clear     Specific Gravity, Urine 1.015 1.005 - 1.035     pH, Urine 5.0 5.0, 5.5, 6.0, 6.5, 7.0, 7.5, 8.0     Protein, Urine NEGATIVE NEGATIVE mg/dL     Glucose, Urine NEGATIVE NEGATIVE mg/dL     Blood, Urine MODERATE  (2+) (A) NEGATIVE     Ketones, Urine NEGATIVE NEGATIVE mg/dL     Bilirubin, Urine NEGATIVE NEGATIVE     Urobilinogen, Urine 2.0 (N) <2.0 mg/dL     Nitrite, Urine NEGATIVE NEGATIVE     Leukocyte Esterase, Urine LARGE (3+) (A) NEGATIVE   Extra Urine Gray Tube   Result Value Ref Range     Extra Tube Hold for add-ons.     Microscopic Only, Urine   Result Value Ref Range     WBC, Urine 21-50 (A) 1-5, NONE /HPF     RBC, Urine 3-5 NONE, 1-2, 3-5 /HPF     Squamous Epithelial Cells, Urine 1-9 (SPARSE) Reference range not established. /HPF     Bacteria, Urine 1+ (A) NONE SEEN /HPF         XR chest 1 view     Result Date: 11/26/2023  Interpreted By:  Prosper Olivas, STUDY: XR CHEST 1 VIEW;  11/26/2023 10:54 am   INDICATION: Signs/Symptoms:Weakness.   COMPARISON: Portable chest, 7 September 2023   ACCESSION NUMBER(S): [id number]   ORDERING CLINICIAN: NICKY SPENCER   TECHNIQUE: Single frontal view of the chest; Portable technique   FINDINGS:   The cardiomediastinal silhouette is unchanged   No consolidative lung opacity   No edema / failure   No large pleural effusion or demonstrable pneumothorax        NO ACUTE DISEASE IN THE CHEST   MACRO: None   Signed by: Prosper Olivas 11/26/2023 11:18 AM Dictation workstation:   [id number]          Assessment/Plan   Principal Problem:    UTI (urinary tract infection)  Body Aches   Elevated troponin  Weakness     Admit to F  Observation  Continue rocephin  urine culture pending      CBC, BMP in am  PT/OT  Repeat troponin and EKG     Chronic conditions: Hypertension, hyperlipidemia, hypothyroidism, coronary artery disease, PVD, breast cancer     Continue home medications as listed above     DVT prophylaxis     SCDs  Lovenox           I spent 45 minutes in the professional and overall care of this patient.     Patient fully evaluated plan as above.  Harmony Estrada, APRN-CNP                 Revision History    Patient fully evaluated on November 27 and Decadron orally added for myalgias.   Await urine culture continue IV Rocephin.  Recheck labs in AM.  Lidoderm patch to sacrum for pain      Principal Problem:    UTI (urinary tract infection)  Active Problems:    Generalized weakness          Patient fully evaluated on November 28.  Still with significant sacral pain.  X-rays to be ordered.  Continue present medications transition to oral antibiotics and monitor.       Varghese Vasquez MD

## 2023-11-28 NOTE — CARE PLAN
The patient's goals for the shift include discharge home      The clinical goals for the shift include Patient will rest comfortably throughout the shift

## 2023-11-29 ENCOUNTER — APPOINTMENT (OUTPATIENT)
Dept: RADIOLOGY | Facility: HOSPITAL | Age: 88
DRG: 689 | End: 2023-11-29
Payer: MEDICARE

## 2023-11-29 LAB
ALBUMIN SERPL BCP-MCNC: 3.3 G/DL (ref 3.4–5)
ALP SERPL-CCNC: 56 U/L (ref 33–136)
ALT SERPL W P-5'-P-CCNC: 10 U/L (ref 7–45)
ANION GAP SERPL CALC-SCNC: 13 MMOL/L (ref 10–20)
AST SERPL W P-5'-P-CCNC: 15 U/L (ref 9–39)
BILIRUB SERPL-MCNC: 0.3 MG/DL (ref 0–1.2)
BUN SERPL-MCNC: 36 MG/DL (ref 6–23)
CALCIUM SERPL-MCNC: 8.8 MG/DL (ref 8.6–10.3)
CHLORIDE SERPL-SCNC: 100 MMOL/L (ref 98–107)
CO2 SERPL-SCNC: 27 MMOL/L (ref 21–32)
CREAT SERPL-MCNC: 0.93 MG/DL (ref 0.5–1.05)
ERYTHROCYTE [DISTWIDTH] IN BLOOD BY AUTOMATED COUNT: 13.3 % (ref 11.5–14.5)
GFR SERPL CREATININE-BSD FRML MDRD: 57 ML/MIN/1.73M*2
GLUCOSE SERPL-MCNC: 101 MG/DL (ref 74–99)
HCT VFR BLD AUTO: 30.7 % (ref 36–46)
HGB BLD-MCNC: 9.6 G/DL (ref 12–16)
MCH RBC QN AUTO: 29.9 PG (ref 26–34)
MCHC RBC AUTO-ENTMCNC: 31.3 G/DL (ref 32–36)
MCV RBC AUTO: 96 FL (ref 80–100)
NRBC BLD-RTO: 0 /100 WBCS (ref 0–0)
PLATELET # BLD AUTO: 245 X10*3/UL (ref 150–450)
POTASSIUM SERPL-SCNC: 4.9 MMOL/L (ref 3.5–5.3)
PROT SERPL-MCNC: 6.1 G/DL (ref 6.4–8.2)
RBC # BLD AUTO: 3.21 X10*6/UL (ref 4–5.2)
SODIUM SERPL-SCNC: 135 MMOL/L (ref 136–145)
WBC # BLD AUTO: 6.2 X10*3/UL (ref 4.4–11.3)

## 2023-11-29 PROCEDURE — 2500000001 HC RX 250 WO HCPCS SELF ADMINISTERED DRUGS (ALT 637 FOR MEDICARE OP): Performed by: INTERNAL MEDICINE

## 2023-11-29 PROCEDURE — 80053 COMPREHEN METABOLIC PANEL: CPT | Performed by: INTERNAL MEDICINE

## 2023-11-29 PROCEDURE — 2500000004 HC RX 250 GENERAL PHARMACY W/ HCPCS (ALT 636 FOR OP/ED): Performed by: NURSE PRACTITIONER

## 2023-11-29 PROCEDURE — 1100000001 HC PRIVATE ROOM DAILY

## 2023-11-29 PROCEDURE — 2500000004 HC RX 250 GENERAL PHARMACY W/ HCPCS (ALT 636 FOR OP/ED): Performed by: INTERNAL MEDICINE

## 2023-11-29 PROCEDURE — 36415 COLL VENOUS BLD VENIPUNCTURE: CPT | Performed by: INTERNAL MEDICINE

## 2023-11-29 PROCEDURE — 74176 CT ABD & PELVIS W/O CONTRAST: CPT

## 2023-11-29 PROCEDURE — 2500000001 HC RX 250 WO HCPCS SELF ADMINISTERED DRUGS (ALT 637 FOR MEDICARE OP): Performed by: NURSE PRACTITIONER

## 2023-11-29 PROCEDURE — 96372 THER/PROPH/DIAG INJ SC/IM: CPT | Performed by: NURSE PRACTITIONER

## 2023-11-29 PROCEDURE — 2500000005 HC RX 250 GENERAL PHARMACY W/O HCPCS: Performed by: INTERNAL MEDICINE

## 2023-11-29 PROCEDURE — 85027 COMPLETE CBC AUTOMATED: CPT | Performed by: INTERNAL MEDICINE

## 2023-11-29 RX ORDER — SULFAMETHOXAZOLE AND TRIMETHOPRIM 800; 160 MG/1; MG/1
320 TABLET ORAL EVERY 12 HOURS SCHEDULED
Status: DISCONTINUED | OUTPATIENT
Start: 2023-11-29 | End: 2023-11-30

## 2023-11-29 RX ADMIN — DEXAMETHASONE 6 MG: 6 TABLET ORAL at 09:49

## 2023-11-29 RX ADMIN — Medication 1 TABLET: at 20:23

## 2023-11-29 RX ADMIN — SULFAMETHOXAZOLE AND TRIMETHOPRIM 320 MG: 800; 160 TABLET ORAL at 20:23

## 2023-11-29 RX ADMIN — AMLODIPINE BESYLATE 10 MG: 10 TABLET ORAL at 09:49

## 2023-11-29 RX ADMIN — ATORVASTATIN CALCIUM 20 MG: 20 TABLET, FILM COATED ORAL at 09:49

## 2023-11-29 RX ADMIN — LIDOCAINE 1 PATCH: 4 PATCH TOPICAL at 09:49

## 2023-11-29 RX ADMIN — ASPIRIN 81 MG: 81 TABLET, COATED ORAL at 09:49

## 2023-11-29 RX ADMIN — ANASTROZOLE 1 MG: 1 TABLET, COATED ORAL at 09:50

## 2023-11-29 RX ADMIN — LEVOTHYROXINE SODIUM 75 MCG: 0.07 TABLET ORAL at 14:48

## 2023-11-29 RX ADMIN — CEFUROXIME AXETIL 250 MG: 250 TABLET, FILM COATED ORAL at 09:50

## 2023-11-29 RX ADMIN — ENOXAPARIN SODIUM 30 MG: 30 INJECTION SUBCUTANEOUS at 14:48

## 2023-11-29 RX ADMIN — Medication 1 TABLET: at 09:50

## 2023-11-29 RX ADMIN — CYANOCOBALAMIN TAB 1000 MCG 1000 MCG: 1000 TAB at 09:49

## 2023-11-29 RX ADMIN — POLYETHYLENE GLYCOL 3350 17 G: 17 POWDER, FOR SOLUTION ORAL at 09:50

## 2023-11-29 RX ADMIN — CHOLECALCIFEROL TAB 25 MCG (1000 UNIT) 2000 UNITS: 25 TAB at 09:49

## 2023-11-29 ASSESSMENT — COGNITIVE AND FUNCTIONAL STATUS - GENERAL
WALKING IN HOSPITAL ROOM: A LITTLE
PERSONAL GROOMING: A LITTLE
MOVING TO AND FROM BED TO CHAIR: A LITTLE
STANDING UP FROM CHAIR USING ARMS: A LITTLE
DRESSING REGULAR LOWER BODY CLOTHING: A LITTLE
MOBILITY SCORE: 19
DAILY ACTIVITIY SCORE: 19
TOILETING: A LITTLE
TOILETING: A LITTLE
MOVING TO AND FROM BED TO CHAIR: A LITTLE
WALKING IN HOSPITAL ROOM: A LITTLE
DRESSING REGULAR UPPER BODY CLOTHING: A LITTLE
CLIMB 3 TO 5 STEPS WITH RAILING: A LOT
DRESSING REGULAR LOWER BODY CLOTHING: A LITTLE
MOBILITY SCORE: 19
HELP NEEDED FOR BATHING: A LITTLE
CLIMB 3 TO 5 STEPS WITH RAILING: A LOT
DRESSING REGULAR UPPER BODY CLOTHING: A LITTLE
HELP NEEDED FOR BATHING: A LITTLE
PERSONAL GROOMING: A LITTLE
STANDING UP FROM CHAIR USING ARMS: A LITTLE
DAILY ACTIVITIY SCORE: 19

## 2023-11-29 ASSESSMENT — PAIN - FUNCTIONAL ASSESSMENT
PAIN_FUNCTIONAL_ASSESSMENT: 0-10
PAIN_FUNCTIONAL_ASSESSMENT: 0-10

## 2023-11-29 ASSESSMENT — PAIN SCALES - GENERAL
PAINLEVEL_OUTOF10: 0 - NO PAIN
PAINLEVEL_OUTOF10: 0 - NO PAIN
PAINLEVEL_OUTOF10: 6
PAINLEVEL_OUTOF10: 6

## 2023-11-29 ASSESSMENT — PAIN DESCRIPTION - DESCRIPTORS: DESCRIPTORS: ACHING

## 2023-11-29 NOTE — CARE PLAN
The patient's goals for the shift include  Free from pain    The clinical goals for the shift include Pt will be free from injury or pain    Over the shift, the patient did not make progress toward the following goals. Barriers to progression include Pt had chronic back pain. Recommendations to address these barriers include Lidocaine patch as scheduled, turns, other inventions to help with pain.

## 2023-11-29 NOTE — PROGRESS NOTES
TCC Note: Pt OBS status. Spoke with NP who spoke with MD regarding status. Pt flipped to Inpatient status. Pt will need SNF. Pt/family was provided SNF list per [facility] directory on Monday. List per [facility] Directory, includes facilities that are within  Post- Acute Quality network as well as meeting patient's medical needs and are in-network for patient's insurance while also in discharge geographic are patient/family prefers. List identifies each facilities CMS star rating. Patient/family choices were received. Pt was accepted by [facility] and can admit on Friday, December 1, 2023. Will continue to follow for hospital course. Margret Keenan, MSN, RN, TCC.

## 2023-11-30 LAB
ALBUMIN SERPL BCP-MCNC: 3.2 G/DL (ref 3.4–5)
ALP SERPL-CCNC: 55 U/L (ref 33–136)
ALT SERPL W P-5'-P-CCNC: 12 U/L (ref 7–45)
ANION GAP SERPL CALC-SCNC: 12 MMOL/L (ref 10–20)
AST SERPL W P-5'-P-CCNC: 16 U/L (ref 9–39)
BACTERIA UR CULT: ABNORMAL
BILIRUB SERPL-MCNC: 0.3 MG/DL (ref 0–1.2)
BUN SERPL-MCNC: 35 MG/DL (ref 6–23)
CALCIUM SERPL-MCNC: 8.9 MG/DL (ref 8.6–10.3)
CHLORIDE SERPL-SCNC: 98 MMOL/L (ref 98–107)
CO2 SERPL-SCNC: 28 MMOL/L (ref 21–32)
CREAT SERPL-MCNC: 0.91 MG/DL (ref 0.5–1.05)
ERYTHROCYTE [DISTWIDTH] IN BLOOD BY AUTOMATED COUNT: 13.2 % (ref 11.5–14.5)
GFR SERPL CREATININE-BSD FRML MDRD: 59 ML/MIN/1.73M*2
GLUCOSE SERPL-MCNC: 107 MG/DL (ref 74–99)
HCT VFR BLD AUTO: 30.6 % (ref 36–46)
HGB BLD-MCNC: 9.9 G/DL (ref 12–16)
MCH RBC QN AUTO: 30.3 PG (ref 26–34)
MCHC RBC AUTO-ENTMCNC: 32.4 G/DL (ref 32–36)
MCV RBC AUTO: 94 FL (ref 80–100)
NRBC BLD-RTO: 0 /100 WBCS (ref 0–0)
PLATELET # BLD AUTO: 239 X10*3/UL (ref 150–450)
POTASSIUM SERPL-SCNC: 4.9 MMOL/L (ref 3.5–5.3)
PROT SERPL-MCNC: 5.9 G/DL (ref 6.4–8.2)
RBC # BLD AUTO: 3.27 X10*6/UL (ref 4–5.2)
SODIUM SERPL-SCNC: 133 MMOL/L (ref 136–145)
WBC # BLD AUTO: 6.7 X10*3/UL (ref 4.4–11.3)

## 2023-11-30 PROCEDURE — 2500000001 HC RX 250 WO HCPCS SELF ADMINISTERED DRUGS (ALT 637 FOR MEDICARE OP): Performed by: NURSE PRACTITIONER

## 2023-11-30 PROCEDURE — 2500000005 HC RX 250 GENERAL PHARMACY W/O HCPCS: Performed by: INTERNAL MEDICINE

## 2023-11-30 PROCEDURE — 36415 COLL VENOUS BLD VENIPUNCTURE: CPT | Performed by: INTERNAL MEDICINE

## 2023-11-30 PROCEDURE — 2500000004 HC RX 250 GENERAL PHARMACY W/ HCPCS (ALT 636 FOR OP/ED): Performed by: INTERNAL MEDICINE

## 2023-11-30 PROCEDURE — 85027 COMPLETE CBC AUTOMATED: CPT | Performed by: INTERNAL MEDICINE

## 2023-11-30 PROCEDURE — 2500000004 HC RX 250 GENERAL PHARMACY W/ HCPCS (ALT 636 FOR OP/ED): Performed by: NURSE PRACTITIONER

## 2023-11-30 PROCEDURE — 74176 CT ABD & PELVIS W/O CONTRAST: CPT | Performed by: RADIOLOGY

## 2023-11-30 PROCEDURE — 97530 THERAPEUTIC ACTIVITIES: CPT | Mod: GP

## 2023-11-30 PROCEDURE — 96372 THER/PROPH/DIAG INJ SC/IM: CPT | Performed by: NURSE PRACTITIONER

## 2023-11-30 PROCEDURE — 80053 COMPREHEN METABOLIC PANEL: CPT | Performed by: INTERNAL MEDICINE

## 2023-11-30 PROCEDURE — 1100000001 HC PRIVATE ROOM DAILY

## 2023-11-30 PROCEDURE — 99222 1ST HOSP IP/OBS MODERATE 55: CPT

## 2023-11-30 RX ORDER — VANCOMYCIN HCL IN 5 % DEXTROSE 1G/250ML
1 PLASTIC BAG, INJECTION (ML) INTRAVENOUS EVERY 24 HOURS
Status: DISCONTINUED | OUTPATIENT
Start: 2023-11-30 | End: 2023-11-30

## 2023-11-30 RX ORDER — VANCOMYCIN 1 G/200ML
1 INJECTION, SOLUTION INTRAVENOUS EVERY 24 HOURS
Status: DISCONTINUED | OUTPATIENT
Start: 2023-11-30 | End: 2023-11-30 | Stop reason: SDUPTHER

## 2023-11-30 RX ORDER — VANCOMYCIN HYDROCHLORIDE 1 G/200ML
1 INJECTION, SOLUTION INTRAVENOUS EVERY 24 HOURS
Status: DISCONTINUED | OUTPATIENT
Start: 2023-11-30 | End: 2023-12-01

## 2023-11-30 RX ADMIN — CHOLECALCIFEROL TAB 25 MCG (1000 UNIT) 2000 UNITS: 25 TAB at 09:28

## 2023-11-30 RX ADMIN — LIDOCAINE 1 PATCH: 4 PATCH TOPICAL at 09:27

## 2023-11-30 RX ADMIN — CYANOCOBALAMIN TAB 1000 MCG 1000 MCG: 1000 TAB at 09:28

## 2023-11-30 RX ADMIN — Medication 1 TABLET: at 09:28

## 2023-11-30 RX ADMIN — ENOXAPARIN SODIUM 30 MG: 30 INJECTION SUBCUTANEOUS at 14:46

## 2023-11-30 RX ADMIN — POLYETHYLENE GLYCOL 3350 17 G: 17 POWDER, FOR SOLUTION ORAL at 09:28

## 2023-11-30 RX ADMIN — Medication 1 TABLET: at 20:49

## 2023-11-30 RX ADMIN — ANASTROZOLE 1 MG: 1 TABLET, COATED ORAL at 09:28

## 2023-11-30 RX ADMIN — LEVOTHYROXINE SODIUM 37.5 MCG: 0.07 TABLET ORAL at 14:46

## 2023-11-30 RX ADMIN — DEXAMETHASONE 6 MG: 6 TABLET ORAL at 09:28

## 2023-11-30 RX ADMIN — VANCOMYCIN HYDROCHLORIDE 1 G: 1 INJECTION, SOLUTION INTRAVENOUS at 21:00

## 2023-11-30 RX ADMIN — AMLODIPINE BESYLATE 10 MG: 10 TABLET ORAL at 09:28

## 2023-11-30 RX ADMIN — SULFAMETHOXAZOLE AND TRIMETHOPRIM 320 MG: 800; 160 TABLET ORAL at 09:28

## 2023-11-30 RX ADMIN — ATORVASTATIN CALCIUM 20 MG: 20 TABLET, FILM COATED ORAL at 09:28

## 2023-11-30 RX ADMIN — ASPIRIN 81 MG: 81 TABLET, COATED ORAL at 09:28

## 2023-11-30 SDOH — SOCIAL STABILITY: SOCIAL INSECURITY: ARE THERE ANY APPARENT SIGNS OF INJURIES/BEHAVIORS THAT COULD BE RELATED TO ABUSE/NEGLECT?: NO

## 2023-11-30 SDOH — SOCIAL STABILITY: SOCIAL INSECURITY: HAVE YOU HAD THOUGHTS OF HARMING ANYONE ELSE?: NO

## 2023-11-30 SDOH — SOCIAL STABILITY: SOCIAL INSECURITY: DOES ANYONE TRY TO KEEP YOU FROM HAVING/CONTACTING OTHER FRIENDS OR DOING THINGS OUTSIDE YOUR HOME?: NO

## 2023-11-30 SDOH — SOCIAL STABILITY: SOCIAL INSECURITY: ABUSE: ADULT

## 2023-11-30 SDOH — SOCIAL STABILITY: SOCIAL INSECURITY: DO YOU FEEL ANYONE HAS EXPLOITED OR TAKEN ADVANTAGE OF YOU FINANCIALLY OR OF YOUR PERSONAL PROPERTY?: NO

## 2023-11-30 SDOH — SOCIAL STABILITY: SOCIAL INSECURITY: DO YOU FEEL UNSAFE GOING BACK TO THE PLACE WHERE YOU ARE LIVING?: NO

## 2023-11-30 SDOH — SOCIAL STABILITY: SOCIAL INSECURITY: HAS ANYONE EVER THREATENED TO HURT YOUR FAMILY OR YOUR PETS?: NO

## 2023-11-30 SDOH — SOCIAL STABILITY: SOCIAL INSECURITY: WERE YOU ABLE TO COMPLETE ALL THE BEHAVIORAL HEALTH SCREENINGS?: YES

## 2023-11-30 SDOH — SOCIAL STABILITY: SOCIAL INSECURITY: ARE YOU OR HAVE YOU BEEN THREATENED OR ABUSED PHYSICALLY, EMOTIONALLY, OR SEXUALLY BY ANYONE?: NO

## 2023-11-30 ASSESSMENT — LIFESTYLE VARIABLES
SKIP TO QUESTIONS 9-10: 1
HOW MANY STANDARD DRINKS CONTAINING ALCOHOL DO YOU HAVE ON A TYPICAL DAY: PATIENT DOES NOT DRINK
HOW OFTEN DO YOU HAVE A DRINK CONTAINING ALCOHOL: NEVER
AUDIT-C TOTAL SCORE: 0
AUDIT-C TOTAL SCORE: 0
HOW OFTEN DO YOU HAVE 6 OR MORE DRINKS ON ONE OCCASION: NEVER

## 2023-11-30 ASSESSMENT — PATIENT HEALTH QUESTIONNAIRE - PHQ9
2. FEELING DOWN, DEPRESSED OR HOPELESS: NOT AT ALL
SUM OF ALL RESPONSES TO PHQ9 QUESTIONS 1 & 2: 0
1. LITTLE INTEREST OR PLEASURE IN DOING THINGS: NOT AT ALL

## 2023-11-30 ASSESSMENT — COGNITIVE AND FUNCTIONAL STATUS - GENERAL
STANDING UP FROM CHAIR USING ARMS: TOTAL
MOVING FROM LYING ON BACK TO SITTING ON SIDE OF FLAT BED WITH BEDRAILS: A LOT
HELP NEEDED FOR BATHING: A LITTLE
DRESSING REGULAR UPPER BODY CLOTHING: A LITTLE
TURNING FROM BACK TO SIDE WHILE IN FLAT BAD: A LOT
MOVING TO AND FROM BED TO CHAIR: TOTAL
MOVING FROM LYING ON BACK TO SITTING ON SIDE OF FLAT BED WITH BEDRAILS: A LOT
CLIMB 3 TO 5 STEPS WITH RAILING: TOTAL
TOILETING: A LOT
DAILY ACTIVITIY SCORE: 16
DRESSING REGULAR LOWER BODY CLOTHING: A LITTLE
WALKING IN HOSPITAL ROOM: TOTAL
CLIMB 3 TO 5 STEPS WITH RAILING: TOTAL
PERSONAL GROOMING: A LOT
WALKING IN HOSPITAL ROOM: TOTAL
STANDING UP FROM CHAIR USING ARMS: TOTAL
EATING MEALS: A LITTLE
MOBILITY SCORE: 8
TURNING FROM BACK TO SIDE WHILE IN FLAT BAD: A LOT
MOBILITY SCORE: 8
MOVING TO AND FROM BED TO CHAIR: TOTAL

## 2023-11-30 ASSESSMENT — PAIN SCALES - GENERAL
PAINLEVEL_OUTOF10: 3
PAINLEVEL_OUTOF10: 0 - NO PAIN

## 2023-11-30 ASSESSMENT — VISUAL ACUITY: VA_NORMAL: 1

## 2023-11-30 ASSESSMENT — ACTIVITIES OF DAILY LIVING (ADL): LACK_OF_TRANSPORTATION: PATIENT DECLINED

## 2023-11-30 ASSESSMENT — PAIN - FUNCTIONAL ASSESSMENT: PAIN_FUNCTIONAL_ASSESSMENT: 0-10

## 2023-11-30 NOTE — PROGRESS NOTES
Regi Leger is a 89 y.o. female on day 1 of admission presenting with UTI (urinary tract infection).      Subjective   Patient fully evaluated on November 27.  Improved but still very weak and probably will require skilled nursing.       Objective     Last Recorded Vitals  /60   Pulse 70   Temp 37 °C (98.6 °F)   Resp 16   Wt 51.3 kg (113 lb 1.5 oz)   SpO2 94%   Intake/Output last 3 Shifts:    Intake/Output Summary (Last 24 hours) at 11/29/2023 1933  Last data filed at 11/29/2023 1130  Gross per 24 hour   Intake --   Output 300 ml   Net -300 ml       Admission Weight  Weight: 51.3 kg (113 lb) (11/26/23 0916)    Daily Weight  11/27/23 : 51.3 kg (113 lb 1.5 oz)    Image Results  XR sacrum coccyx 2+ views  Narrative: Interpreted By:  Darnell Contreras,   STUDY:  XR SACRUM COCCYX 2+ VIEWS; ;  11/28/2023 12:55 pm      INDICATION:  Signs/Symptoms:PAIN.      COMPARISON:  X-ray lumbar spine 06/24/2018      ACCESSION NUMBER(S):  [id number]      ORDERING CLINICIAN:  DEBBIE FLORES      FINDINGS:  Three view sacrum/coccyx      On the lateral projection, there is deformity/irregularity and  discontinuity of anterior and posterior cortical margins at the lower  sacrum with apparent associated cortical lucency particularly  posteriorly new since prior. There is some regional soft tissue  swelling. Degenerative changes visualized lower lumbar spine. There  is also irregularity at the left superior pubic ramus on 1  projection. Mild degenerative changes of the visualized hip joints  mild patchy sclerotic appearance at the femoral heads bilaterally  left greater than right. There is also focal sclerotic appearance at  the mid to lower sacrum centrally on the AP projection. Evaluation of  sacrum and pelvic bones elsewhere otherwise limited by overlying  bowel gas and fecal material. Partially imaged stent grafts in the  lower aorta and iliac arteries.      Multiple calcific densities overlie the pelvis probably  phleboliths  and/or vascular calcifications. Curvilinear calcification partially  imaged overlying the lower abdomen about the stent material on the  left probably large peripherally calcified inferior abdominal aortic  aneurysmal sac which was present previously but otherwise limited  evaluation on this examination.      Impression: Deformity/irregularity with apparent cortical discontinuity at the  lower sacrum as described new since prior and concerning for sequela  of fracture of indeterminate age. Focal sclerotic appearance at the  mid to lower sacrum centrally on the AP projection of uncertain  significance but could be related. There is some regional soft tissue  swelling. Underlying infectious/inflammatory process cannot be  excluded. There is also irregularity at the superior left pubic ramus  on 1 projection of uncertain significance but with underlying  nondisplaced fracture not excluded. Clinical correlation and  follow-up advised and consider dedicated CT or MRI for further  assessment.      Patchy sclerotic appearance at the femoral heads bilaterally left  greater than right can be seen in the setting of avascular necrosis.  This could also be further evaluated by CT or MRI.      Partially imaged aorto bi-iliac stent graft. Curvilinear  calcification extending to the left at the lower abdomen likely  partially imaged peripherally calcified large abdominal aortic  aneurysmal sac which was present previously but otherwise evaluation  limited on this examination.      MACRO:  None      Signed by: Darnell Contreras 11/28/2023 1:35 PM  Dictation workstation:   [id number]      Physical Exam    Relevant Results               Assessment/Plan                  Varghese Vasquez MD  Physician  Internal Medicine     H&P      Addendum     Date of Service: 11/26/2023  2:46 PM     Addendum       Expand All Collapse All    History Of Present Illness  Regi Leger is a 89 y.o. female with past medical history of  abdominal aortic aneurysm s/p repair (01/2022), endoleak of aortic graft (no intervention pursued per patient's wishes), severe aortic stenosis, CAD s/p PCI/stent RCA, hypertension, hyperlipidemia, hypothyroidism, vocal cord paralysis following thyroidectomy,  CKD stage III, nephrolithiasis, urinary retention, PVD, anemia, osteoarthritis, DJD, breast cancer, and skin cancer who presented today with weakness.  HPI somewhat limited due to patient is hard of hearing.  She reports over the past few days she has been experiencing progressively worsening weakness.  She reports she became concerned that she may fall so she came to the emergency room for evaluation.  She admits to associated body aches that she has been taking over-the-counter medicine for.  She denies any fevers, chills, chest pain, shortness of breath, cough, abdominal pain, nausea, vomiting, diarrhea, or dysuria, changes in her chronic urinary incontinence.     In the ED lab work, EKG, and chest x-ray were performed. Labs revealed glucose 102, sodium 135 (chronically mildly low), bun 26 (chronic), , troponin 20, glucose 22, chronic anemia noted with red blood cell count 2.44, hemoglobin 10.5, and hematocrit 32.8, neutrophils 5.98, lymphocytes 0.74 UA revealed yellow hazy urine with moderate blood urobilinogen 2, large leuks.  Chest x-ray revealed no acute process.  EKG, per ER physician impression revealed normal sinus rhythm at a rate of 67 with nonspecific changes.  Vital signs were stable while in the ED.  She was given Rocephin and admitted to Dr. Vasquez.     10 point ROS negative except as noted above in HPI     Past medical history: As above  Past surgical history: Thyroidectomy, hysterectomy, PTCA/PCI with stent to RCA, AAA repair, cataract surgery  Social history: No history of smoking, alcohol abuse, or illicit drug use.  Lives with sister and nephew.  Ambulates with walker.  Family history: Father-stomach cancer; mother-myocardial  "infarction     Allergies  Pravastatin and Penicillins     Physical Exam  Constitutional:       Comments: Thin   HENT:      Head: Normocephalic and atraumatic.      Mouth/Throat:      Mouth: Mucous membranes are dry.   Eyes:      Conjunctiva/sclera: Conjunctivae normal.   Cardiovascular:      Rate and Rhythm: Normal rate and regular rhythm.      Heart sounds: Murmur heard.   Pulmonary:      Effort: Pulmonary effort is normal.      Breath sounds: No rales.   Abdominal:      General: Abdomen is flat. Bowel sounds are normal.      Palpations: Abdomen is soft.   Musculoskeletal:         General: Normal range of motion.      Cervical back: Neck supple.   Neurological:      Mental Status: She is alert. Mental status is at baseline.   Psychiatric:         Mood and Affect: Mood normal.            Last Recorded Vitals  Blood pressure (!) 155/97, pulse 77, temperature 36.5 °C (97.7 °F), temperature source Tympanic, resp. rate 25, height 1.727 m (5' 8\"), weight 51.3 kg (113 lb), SpO2 97 %.     Relevant Results     No current facility-administered medications on file prior to encounter.             Current Outpatient Medications on File Prior to Encounter   Medication Sig Dispense Refill    amLODIPine (Norvasc) 10 mg tablet Take 1 tablet (10 mg) by mouth once daily.        anastrozole (Arimidex) 1 mg tablet Take 1 tablet (1 mg total) by mouth once daily.        aspirin 81 mg EC tablet Take 1 tablet (81 mg) by mouth once daily.        cholecalciferol (Vitamin D-3) 50 MCG (2000 UT) tablet Take 1 tablet (2,000 Units) by mouth once daily.        cyanocobalamin (Vitamin B-12) 1,000 mcg tablet Take 1 tablet (1,000 mcg) by mouth once daily.        levothyroxine (Synthroid) 75 mcg tablet Take 1 tablet (75 mcg) by mouth once a day on Monday, Wednesday, and Friday.        atorvastatin (Lipitor) 20 mg tablet Take 1 tablet (20 mg) by mouth once daily.        levothyroxine (Synthroid, Levoxyl) 75 mcg tablet Take 0.5 tablets (37.5 mcg) by " mouth once a day on Sunday, Tuesday, Thursday, and Saturday.                Results for orders placed or performed during the hospital encounter of 11/26/23 (from the past 24 hour(s))   CBC and Auto Differential   Result Value Ref Range     WBC 7.6 4.4 - 11.3 x10*3/uL     nRBC 0.0 0.0 - 0.0 /100 WBCs     RBC 3.44 (L) 4.00 - 5.20 x10*6/uL     Hemoglobin 10.5 (L) 12.0 - 16.0 g/dL     Hematocrit 32.8 (L) 36.0 - 46.0 %     MCV 95 80 - 100 fL     MCH 30.5 26.0 - 34.0 pg     MCHC 32.0 32.0 - 36.0 g/dL     RDW 13.9 11.5 - 14.5 %     Platelets 277 150 - 450 x10*3/uL     Neutrophils % 78.5 40.0 - 80.0 %     Immature Granulocytes %, Automated 0.4 0.0 - 0.9 %     Lymphocytes % 9.7 13.0 - 44.0 %     Monocytes % 8.7 2.0 - 10.0 %     Eosinophils % 2.0 0.0 - 6.0 %     Basophils % 0.7 0.0 - 2.0 %     Neutrophils Absolute 5.98 (H) 1.60 - 5.50 x10*3/uL     Immature Granulocytes Absolute, Automated 0.03 0.00 - 0.50 x10*3/uL     Lymphocytes Absolute 0.74 (L) 0.80 - 3.00 x10*3/uL     Monocytes Absolute 0.66 0.05 - 0.80 x10*3/uL     Eosinophils Absolute 0.15 0.00 - 0.40 x10*3/uL     Basophils Absolute 0.05 0.00 - 0.10 x10*3/uL   Comprehensive metabolic panel   Result Value Ref Range     Glucose 102 (H) 74 - 99 mg/dL     Sodium 135 (L) 136 - 145 mmol/L     Potassium 4.5 3.5 - 5.3 mmol/L     Chloride 99 98 - 107 mmol/L     Bicarbonate 29 21 - 32 mmol/L     Anion Gap 12 10 - 20 mmol/L     Urea Nitrogen 26 (H) 6 - 23 mg/dL     Creatinine 0.91 0.50 - 1.05 mg/dL     eGFR 59 (L) >60 mL/min/1.73m*2     Calcium 9.3 8.6 - 10.3 mg/dL     Albumin 3.8 3.4 - 5.0 g/dL     Alkaline Phosphatase 73 33 - 136 U/L     Total Protein 7.3 6.4 - 8.2 g/dL     AST 20 9 - 39 U/L     Bilirubin, Total 0.4 0.0 - 1.2 mg/dL     ALT 11 7 - 45 U/L   Troponin I, High Sensitivity   Result Value Ref Range     Troponin I, High Sensitivity 20 (H) 0 - 13 ng/L   B-Type Natriuretic Peptide   Result Value Ref Range      (H) 0 - 99 pg/mL   Urinalysis with Reflex Microscopic  and Culture   Result Value Ref Range     Color, Urine Yellow Straw, Yellow     Appearance, Urine Hazy (N) Clear     Specific Gravity, Urine 1.015 1.005 - 1.035     pH, Urine 5.0 5.0, 5.5, 6.0, 6.5, 7.0, 7.5, 8.0     Protein, Urine NEGATIVE NEGATIVE mg/dL     Glucose, Urine NEGATIVE NEGATIVE mg/dL     Blood, Urine MODERATE (2+) (A) NEGATIVE     Ketones, Urine NEGATIVE NEGATIVE mg/dL     Bilirubin, Urine NEGATIVE NEGATIVE     Urobilinogen, Urine 2.0 (N) <2.0 mg/dL     Nitrite, Urine NEGATIVE NEGATIVE     Leukocyte Esterase, Urine LARGE (3+) (A) NEGATIVE   Extra Urine Gray Tube   Result Value Ref Range     Extra Tube Hold for add-ons.     Microscopic Only, Urine   Result Value Ref Range     WBC, Urine 21-50 (A) 1-5, NONE /HPF     RBC, Urine 3-5 NONE, 1-2, 3-5 /HPF     Squamous Epithelial Cells, Urine 1-9 (SPARSE) Reference range not established. /HPF     Bacteria, Urine 1+ (A) NONE SEEN /HPF         XR chest 1 view     Result Date: 11/26/2023  Interpreted By:  Prosper Olivas, STUDY: XR CHEST 1 VIEW;  11/26/2023 10:54 am   INDICATION: Signs/Symptoms:Weakness.   COMPARISON: Portable chest, 7 September 2023   ACCESSION NUMBER(S): [id number]   ORDERING CLINICIAN: NICKY SPENCER   TECHNIQUE: Single frontal view of the chest; Portable technique   FINDINGS:   The cardiomediastinal silhouette is unchanged   No consolidative lung opacity   No edema / failure   No large pleural effusion or demonstrable pneumothorax        NO ACUTE DISEASE IN THE CHEST   MACRO: None   Signed by: Prosper Olivas 11/26/2023 11:18 AM Dictation workstation:   [id number]          Assessment/Plan   Principal Problem:    UTI (urinary tract infection)  Body Aches   Elevated troponin  Weakness     Admit to [facility]  Observation  Continue rocephin  urine culture pending      CBC, BMP in am  PT/OT  Repeat troponin and EKG     Chronic conditions: Hypertension, hyperlipidemia, hypothyroidism, coronary artery disease, PVD, breast cancer     Continue home medications  as listed above     DVT prophylaxis     SCDs  Lovenox           I spent 45 minutes in the professional and overall care of this patient.     Patient fully evaluated plan as above.  Harmony Estrada, APRN-CNP                 Revision History    Patient fully evaluated on November 27 and Decadron orally added for myalgias.  Await urine culture continue IV Rocephin.  Recheck labs in AM.  Lidoderm patch to sacrum for pain      Principal Problem:    UTI (urinary tract infection)  Active Problems:    Generalized weakness          Patient fully evaluated on November 28.  Still with significant sacral pain.  X-rays to be ordered.  Continue present medications transition to oral antibiotics and monitor.     Patient fully evaluated on November 29.  Awaiting final urine sensitivity.  Formerly and sacrum noted on plain x-ray and orthopedic consultation pain.  CAT scan ordered at that area.  Neurology consultation for tremors.  Varghese Vasquez MD

## 2023-11-30 NOTE — PROGRESS NOTES
Physical Therapy    Physical Therapy Treatment    Patient Name: Regi Leger  MRN: [id number]  Today's Date: 11/30/2023  Time Calculation  Start Time: 1448  Stop Time: 1508  Time Calculation (min): 20 min       Assessment/Plan         PT Plan  Treatment/Interventions: Bed mobility, Transfer training, Gait training, Strengthening  PT Plan: Skilled PT  PT Frequency: 3 times per week  PT Discharge Recommendations: Moderate intensity level of continued care  PT - OK to Discharge: Yes      General Visit Information:      General  General Comment:  (pt w/o tremors at rest but w/ activity of eval significant tremors of body all extremties)    Subjective   Precautions:     Vital Signs:       Objective   Pain:     Cognition:  Cognition  Overall Cognitive Status: Within Functional Limits  Postural Control: static sit varies from dep of 1 to mod of 1          Treatments:  Dangle eob ~10MINS  Bed Mobility  Bed Mobility:  (max of 1)    Ambulation/Gait Training  Ambulation/Gait Training Performed:  (unable)  Transfers  Transfer:  (until to tx sit>stand onto rw inspite of max of 1, several attempts,  as pt so tremorulos)    Outcome Measures:  [facility] Basic Mobility  Turning from your back to your side while in a flat bed without using bedrails: A lot  Moving from lying on your back to sitting on the side of a flat bed without using bedrails: A lot  Moving to and from bed to chair (including a wheelchair): Total  Standing up from a chair using your arms (e.g. wheelchair or bedside chair): Total  To walk in hospital room: Total  Climbing 3-5 steps with railing: Total  Basic Mobility - Total Score: 8    Education Documentation  No documentation found.  Education Comments  No comments found.        OP EDUCATION:       Encounter Problems       Encounter Problems (Active)       PT Problem       PT Goal 1 (Progressing)       Start:  11/27/23    Expected End:  12/11/23       Indep bed mob          PT Goal 2 (Progressing)       Start:   11/27/23    Expected End:  12/11/23       Min of 1 txs         PT Goal 3 (Progressing)       Start:  11/27/23    Expected End:  12/11/23       Min of 1 amb w. Rw 40ft x3 laps in room          PT Goal 4 (Progressing)       Start:  11/27/23    Expected End:  12/11/23       20-30 reps ble there ex            Pain - Adult

## 2023-11-30 NOTE — PROGRESS NOTES
Regi Leger is a 89 y.o. female on day 2 of admission presenting with UTI (urinary tract infection).      Subjective   Patient fully evaluated on November 27.  Improved but still very weak and probably will require skilled nursing.       Objective     Last Recorded Vitals  /60 (BP Location: Right arm, Patient Position: Lying)   Pulse 68   Temp 36.9 °C (98.4 °F) (Temporal)   Resp 18   Wt 51.3 kg (113 lb 1.5 oz)   SpO2 94%   Intake/Output last 3 Shifts:    Intake/Output Summary (Last 24 hours) at 11/30/2023 1707  Last data filed at 11/30/2023 0400  Gross per 24 hour   Intake --   Output 800 ml   Net -800 ml         Admission Weight  Weight: 51.3 kg (113 lb) (11/26/23 0916)    Daily Weight  11/27/23 : 51.3 kg (113 lb 1.5 oz)    Image Results  CT abdomen pelvis wo IV contrast  Narrative: Interpreted By:  Darryl Osborn,   STUDY:  CT ABDOMEN PELVIS WO IV CONTRAST;  11/30/2023 3:57 am      INDICATION:  Signs/Symptoms:sacral pain.      COMPARISON:  08/15/2022      ACCESSION NUMBER(S):  [id number]      ORDERING CLINICIAN:  EDBBIE FLORES      TECHNIQUE:  CT of the abdomen and pelvis was performed. Contiguous axial images  were obtained at 3 mm slice thickness through the abdomen and pelvis.  Coronal and sagittal reconstructions at 3 mm slice thickness were  performed.  Intravenous or oral contrast was not administered.      FINDINGS:  LOWER CHEST:  Small bilateral pleural effusions with compressive atelectasis.  Radiodensity at the right coronary artery would indicate  atherosclerotic calcification or stent.      ABDOMEN:      LIVER:  There is increased attenuation of the liver indicating hemosiderosis  or possibly amiodarone therapy. The dome of the liver was not  included, the patient was not recalled for additional imaging at this  time due to COVID precautions. If there is clinical concern of  hepatic pathology additional imaging possible could be obtained if  requested. The hepatic parenchyma otherwise is  homogeneous. The  hepatic size is normal.      SPLEEN:  The spleen is normal in size and homogeneous.      ADRENAL GLANDS:  Bilateral adrenal glands appear normal.      KIDNEYS AND URETERS:  There is moderate left renal cortical atrophy, measuring 7.8 cm in  length. There are several left renal cortical cysts. The right renal  cortex is maintained.      There are several nonobstructing intrarenal calculi, the largest  measuring 8 mm at the right interpole. The distal right ureteral  course is poorly visualized due to crowded overlying bowel loops.  Otherwise no identified urinary tract dilatation or radiodense  calculi.      PANCREAS:  Again as previously there are multiple hypodensities, the largest  measuring 2.1 cm at the neck of relative fluid attenuation indicating  cyst or cystic/mucinous neoplasm. No ductal dilatation.      GALLBLADDER:  No radiodense calculi, wall thickening or pericholecystic fluid.      BILE DUCTS:  There is no biliary dilatation or filling defects.          VESSELS:  Again there is an aortic-bi iliac endovascular stent extending  through a bimodal abdominal aortic aneurysm. The latter measures up  to 3.9 cm at the L3 level, with more saccular dilatation measuring  4.7 cm at the bifurcation, similar to the prior exam.      PERITONEUM AND RETROPERITONEUM:  Trace ascites is seen at the pelvis. No free intraperitoneal air.      The retroperitoneum appears unremarkable, and without significant  adenopathy.      No enlarged mesenteric lymph nodes.      BOWEL:  Fecal residue seen throughout the colon which is mildly dilated,  greatest at the rectosigmoid colon measuring 6.6 cm in diameter. This  is most likely due to constipation probably early impaction. There is  mild reticulation of the presacral space probably from dependent  edema. The stomach and small bowel segments appear unremarkable.      PELVIS:      BLADDER:  The urinary bladder contour is smooth.      REPRODUCTIVE ORGANS:  Status  post hysterectomy.          BONE, ABDOMINAL WALL AND OTHER FINDINGS:  Of note is sclerosis of the S5 vertebral body not evident on the  previous exam. This is nonspecific, but may be due to chronic  osteomyelitis as there is a left paramedian subcutaneous/soft tissue  fistula tract from approximately this level to the tip of the coccyx  is slightly more inferiorly to the inferior margin of the gluteal  fold best seen on image numbers 108 through 132 of series 201.  associated subcutaneous reticulation extends more superiorly to the  S3 level. However, as the fistula tract itself is not appear to  contact the S5 vertebral body, sclerosis due to developing stress  fracture is not excluded. A sclerotic metastatic lesion is considered  unlikely as there are no other bony sclerotic foci, but not  completely excluded. No evidence of additional osteomyelitis  including the coccyx. However if more definitive exclusion of such is  needed correlation with MRI and radionuclide imaging as warranted  would be recommended.      The abdominal wall soft tissues appear normal.      Impression: 1.  Sclerosis of the S5 vertebral body, suspicious for chronic  osteomyelitis as there is a locoregional superficial subcutaneous  fistula tract as described.  2. Increased attenuation of the hepatic parenchyma, probably due to  hemosiderosis, or can occur with amiodarone therapy. The dome of the  liver was not included.  3. Trace ascites.  4. Probable constipation and possibly impaction.  5. Redemonstration of pancreatic cyst or cystic/mucinous neoplasms.  6. Moderate left renal cortical atrophy with bilateral nonobstructing  intrarenal calculi.  7. Stable abdominal aortic aneurysms with endovascular stents.      MACRO:  1. None      Signed by: Darryl Osborn 11/30/2023 9:13 AM  Dictation workstation:   [id number]      Physical Exam    Relevant Results               Assessment/Plan                  Varghese Vasquez MD  Physician  Internal  Medicine     H&P      Addendum     Date of Service: 11/26/2023  2:46 PM     Addendum       Expand All Collapse All    History Of Present Illness  Regi Leger is a 89 y.o. female with past medical history of abdominal aortic aneurysm s/p repair (01/2022), endoleak of aortic graft (no intervention pursued per patient's wishes), severe aortic stenosis, CAD s/p PCI/stent RCA, hypertension, hyperlipidemia, hypothyroidism, vocal cord paralysis following thyroidectomy,  CKD stage III, nephrolithiasis, urinary retention, PVD, anemia, osteoarthritis, DJD, breast cancer, and skin cancer who presented today with weakness.  HPI somewhat limited due to patient is hard of hearing.  She reports over the past few days she has been experiencing progressively worsening weakness.  She reports she became concerned that she may fall so she came to the emergency room for evaluation.  She admits to associated body aches that she has been taking over-the-counter medicine for.  She denies any fevers, chills, chest pain, shortness of breath, cough, abdominal pain, nausea, vomiting, diarrhea, or dysuria, changes in her chronic urinary incontinence.     In the ED lab work, EKG, and chest x-ray were performed. Labs revealed glucose 102, sodium 135 (chronically mildly low), bun 26 (chronic), , troponin 20, glucose 22, chronic anemia noted with red blood cell count 2.44, hemoglobin 10.5, and hematocrit 32.8, neutrophils 5.98, lymphocytes 0.74 UA revealed yellow hazy urine with moderate blood urobilinogen 2, large leuks.  Chest x-ray revealed no acute process.  EKG, per ER physician impression revealed normal sinus rhythm at a rate of 67 with nonspecific changes.  Vital signs were stable while in the ED.  She was given Rocephin and admitted to Dr. Vasquez.     10 point ROS negative except as noted above in HPI     Past medical history: As above  Past surgical history: Thyroidectomy, hysterectomy, PTCA/PCI with stent to RCA, AAA repair,  "cataract surgery  Social history: No history of smoking, alcohol abuse, or illicit drug use.  Lives with sister and nephew.  Ambulates with walker.  Family history: Father-stomach cancer; mother-myocardial infarction     Allergies  Pravastatin and Penicillins     Physical Exam  Constitutional:       Comments: Thin   HENT:      Head: Normocephalic and atraumatic.      Mouth/Throat:      Mouth: Mucous membranes are dry.   Eyes:      Conjunctiva/sclera: Conjunctivae normal.   Cardiovascular:      Rate and Rhythm: Normal rate and regular rhythm.      Heart sounds: Murmur heard.   Pulmonary:      Effort: Pulmonary effort is normal.      Breath sounds: No rales.   Abdominal:      General: Abdomen is flat. Bowel sounds are normal.      Palpations: Abdomen is soft.   Musculoskeletal:         General: Normal range of motion.      Cervical back: Neck supple.   Neurological:      Mental Status: She is alert. Mental status is at baseline.   Psychiatric:         Mood and Affect: Mood normal.            Last Recorded Vitals  Blood pressure (!) 155/97, pulse 77, temperature 36.5 °C (97.7 °F), temperature source Tympanic, resp. rate 25, height 1.727 m (5' 8\"), weight 51.3 kg (113 lb), SpO2 97 %.     Relevant Results     No current facility-administered medications on file prior to encounter.             Current Outpatient Medications on File Prior to Encounter   Medication Sig Dispense Refill    amLODIPine (Norvasc) 10 mg tablet Take 1 tablet (10 mg) by mouth once daily.        anastrozole (Arimidex) 1 mg tablet Take 1 tablet (1 mg total) by mouth once daily.        aspirin 81 mg EC tablet Take 1 tablet (81 mg) by mouth once daily.        cholecalciferol (Vitamin D-3) 50 MCG (2000 UT) tablet Take 1 tablet (2,000 Units) by mouth once daily.        cyanocobalamin (Vitamin B-12) 1,000 mcg tablet Take 1 tablet (1,000 mcg) by mouth once daily.        levothyroxine (Synthroid) 75 mcg tablet Take 1 tablet (75 mcg) by mouth once a day on " Monday, Wednesday, and Friday.        atorvastatin (Lipitor) 20 mg tablet Take 1 tablet (20 mg) by mouth once daily.        levothyroxine (Synthroid, Levoxyl) 75 mcg tablet Take 0.5 tablets (37.5 mcg) by mouth once a day on Sunday, Tuesday, Thursday, and Saturday.                Results for orders placed or performed during the hospital encounter of 11/26/23 (from the past 24 hour(s))   CBC and Auto Differential   Result Value Ref Range     WBC 7.6 4.4 - 11.3 x10*3/uL     nRBC 0.0 0.0 - 0.0 /100 WBCs     RBC 3.44 (L) 4.00 - 5.20 x10*6/uL     Hemoglobin 10.5 (L) 12.0 - 16.0 g/dL     Hematocrit 32.8 (L) 36.0 - 46.0 %     MCV 95 80 - 100 fL     MCH 30.5 26.0 - 34.0 pg     MCHC 32.0 32.0 - 36.0 g/dL     RDW 13.9 11.5 - 14.5 %     Platelets 277 150 - 450 x10*3/uL     Neutrophils % 78.5 40.0 - 80.0 %     Immature Granulocytes %, Automated 0.4 0.0 - 0.9 %     Lymphocytes % 9.7 13.0 - 44.0 %     Monocytes % 8.7 2.0 - 10.0 %     Eosinophils % 2.0 0.0 - 6.0 %     Basophils % 0.7 0.0 - 2.0 %     Neutrophils Absolute 5.98 (H) 1.60 - 5.50 x10*3/uL     Immature Granulocytes Absolute, Automated 0.03 0.00 - 0.50 x10*3/uL     Lymphocytes Absolute 0.74 (L) 0.80 - 3.00 x10*3/uL     Monocytes Absolute 0.66 0.05 - 0.80 x10*3/uL     Eosinophils Absolute 0.15 0.00 - 0.40 x10*3/uL     Basophils Absolute 0.05 0.00 - 0.10 x10*3/uL   Comprehensive metabolic panel   Result Value Ref Range     Glucose 102 (H) 74 - 99 mg/dL     Sodium 135 (L) 136 - 145 mmol/L     Potassium 4.5 3.5 - 5.3 mmol/L     Chloride 99 98 - 107 mmol/L     Bicarbonate 29 21 - 32 mmol/L     Anion Gap 12 10 - 20 mmol/L     Urea Nitrogen 26 (H) 6 - 23 mg/dL     Creatinine 0.91 0.50 - 1.05 mg/dL     eGFR 59 (L) >60 mL/min/1.73m*2     Calcium 9.3 8.6 - 10.3 mg/dL     Albumin 3.8 3.4 - 5.0 g/dL     Alkaline Phosphatase 73 33 - 136 U/L     Total Protein 7.3 6.4 - 8.2 g/dL     AST 20 9 - 39 U/L     Bilirubin, Total 0.4 0.0 - 1.2 mg/dL     ALT 11 7 - 45 U/L   Troponin I, High  Sensitivity   Result Value Ref Range     Troponin I, High Sensitivity 20 (H) 0 - 13 ng/L   B-Type Natriuretic Peptide   Result Value Ref Range      (H) 0 - 99 pg/mL   Urinalysis with Reflex Microscopic and Culture   Result Value Ref Range     Color, Urine Yellow Straw, Yellow     Appearance, Urine Hazy (N) Clear     Specific Gravity, Urine 1.015 1.005 - 1.035     pH, Urine 5.0 5.0, 5.5, 6.0, 6.5, 7.0, 7.5, 8.0     Protein, Urine NEGATIVE NEGATIVE mg/dL     Glucose, Urine NEGATIVE NEGATIVE mg/dL     Blood, Urine MODERATE (2+) (A) NEGATIVE     Ketones, Urine NEGATIVE NEGATIVE mg/dL     Bilirubin, Urine NEGATIVE NEGATIVE     Urobilinogen, Urine 2.0 (N) <2.0 mg/dL     Nitrite, Urine NEGATIVE NEGATIVE     Leukocyte Esterase, Urine LARGE (3+) (A) NEGATIVE   Extra Urine Gray Tube   Result Value Ref Range     Extra Tube Hold for add-ons.     Microscopic Only, Urine   Result Value Ref Range     WBC, Urine 21-50 (A) 1-5, NONE /HPF     RBC, Urine 3-5 NONE, 1-2, 3-5 /HPF     Squamous Epithelial Cells, Urine 1-9 (SPARSE) Reference range not established. /HPF     Bacteria, Urine 1+ (A) NONE SEEN /HPF         XR chest 1 view     Result Date: 11/26/2023  Interpreted By:  Prosper Olivas, STUDY: XR CHEST 1 VIEW;  11/26/2023 10:54 am   INDICATION: Signs/Symptoms:Weakness.   COMPARISON: Portable chest, 7 September 2023   ACCESSION NUMBER(S): [id number]   ORDERING CLINICIAN: NICKY SPENCER   TECHNIQUE: Single frontal view of the chest; Portable technique   FINDINGS:   The cardiomediastinal silhouette is unchanged   No consolidative lung opacity   No edema / failure   No large pleural effusion or demonstrable pneumothorax        NO ACUTE DISEASE IN THE CHEST   MACRO: None   Signed by: Prosper Olivas 11/26/2023 11:18 AM Dictation workstation:   [id number]          Assessment/Plan   Principal Problem:    UTI (urinary tract infection)  Body Aches   Elevated troponin  Weakness     Admit to [facility]  Observation  Continue rocephin  urine  culture pending      CBC, BMP in am  PT/OT  Repeat troponin and EKG     Chronic conditions: Hypertension, hyperlipidemia, hypothyroidism, coronary artery disease, PVD, breast cancer     Continue home medications as listed above     DVT prophylaxis     SCDs  Lovenox           I spent 45 minutes in the professional and overall care of this patient.     Patient fully evaluated plan as above.  Harmony Estrada, APRN-CNP                 Revision History    Patient fully evaluated on November 27 and Decadron orally added for myalgias.  Await urine culture continue IV Rocephin.  Recheck labs in AM.  Lidoderm patch to sacrum for pain      Principal Problem:    UTI (urinary tract infection)  Active Problems:    Generalized weakness          Patient fully evaluated on November 28.  Still with significant sacral pain.  X-rays to be ordered.  Continue present medications transition to oral antibiotics and monitor.     Patient fully evaluated on November 29.  Awaiting final urine sensitivity.  Formerly and sacrum noted on plain x-ray and orthopedic consultation pain.  CAT scan ordered at that area.  Neurology consultation for tremors.    Patient fully evaluated on November 30.  Results of CAT scan are noted and patient started on IV antibiotics pending further infectious disease and orthopedic recommendations.  Recheck labs in AM.  Varghese Vasquez MD

## 2023-11-30 NOTE — CONSULTS
Consults    History Of Present Illness  Regi Leger is a 89 y.o. 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  Patient was initially admitted to the hospital due to progressive weakness.  Patient was admitted for urinary tract infection.  Patient being evaluated by Ortho for potential sacral fracture.  Neurology consulted for evaluation of tremor.  When speaking to the patient she mentions that she has noticed her tremor is worse when she stands up or attempts to walk around.  She has had progressive weakness associated with this tremor over the last several weeks, has been to the hospital admitted to skilled nursing facility and initially had been doing well at home.  She did have an episode of weakness while she was on the toilet when she was unable to get up.      While in the emergency department patient's labs were largely unremarkable.  Her UA did demonstrate moderate blood, large leukocyte esterase.  Patient's COVID result was positive.  Patient was admitted to medicine service for further management.  Has been receiving treatment for COVID-19.      Past Medical History  Past Medical History:   Diagnosis Date    Abnormal findings on diagnostic imaging of other abdominal regions, including retroperitoneum     Abnormal abdominal ultrasound    Abnormal findings on diagnostic imaging of other abdominal regions, including retroperitoneum     Abnormal CT of the abdomen    Abnormal findings on diagnostic imaging of other specified body structures     Abnormal MRI    Abnormal findings on diagnostic imaging of other specified body structures     Abnormal ultrasound    Old myocardial infarction 12/09/2019    History of myocardial infarction    Personal history of other  diseases of the nervous system and sense organs     History of cataract    Personal history of other medical treatment     History of mammogram    Personal history of other medical treatment     History of echocardiogram    Personal history of other specified conditions     History of heartburn    Personal history of urinary calculi 12/09/2019    History of renal calculi     Family history:  -Father, stomach cancer  -Mother, myocardial infarction.    Surgical History  Past Surgical History:   Procedure Laterality Date    CT ABDOMEN PELVIS ANGIOGRAM W AND/OR WO IV CONTRAST  12/9/2019    CT ABDOMEN PELVIS ANGIOGRAM W AND/OR WO IV CONTRAST 12/9/2019 [facility]    CT ABDOMEN PELVIS ANGIOGRAM W AND/OR WO IV CONTRAST  12/16/2021    CT ABDOMEN PELVIS ANGIOGRAM W AND/OR WO IV CONTRAST 12/16/2021 [facility]    CT ABDOMEN PELVIS ANGIOGRAM W AND/OR WO IV CONTRAST  8/15/2022    CT ABDOMEN PELVIS ANGIOGRAM W AND/OR WO IV CONTRAST 8/15/2022 [facility]    OTHER SURGICAL HISTORY  12/09/2019    Complete colonoscopy    OTHER SURGICAL HISTORY  12/09/2019    Hysterectomy    OTHER SURGICAL HISTORY  12/09/2019    Thyroidectomy    OTHER SURGICAL HISTORY  12/09/2019    Cataract surgery    OTHER SURGICAL HISTORY  01/13/2020    Cardiac catheterization with stent placement    OTHER SURGICAL HISTORY  01/13/2020    Cardiac catheterization    OTHER SURGICAL HISTORY  08/04/2020    Endoscopy    OTHER SURGICAL HISTORY  12/09/2019    Tonsillectomy     Social History  Social History     Tobacco Use    Smoking status: Never    Smokeless tobacco: Never   Vaping Use    Vaping Use: Never used   Substance Use Topics    Alcohol use: Never    Drug use: Never     Allergies  Pravastatin and Penicillins  Medications Prior to Admission   Medication Sig Dispense Refill Last Dose    amLODIPine (Norvasc) 10 mg tablet Take 1 tablet (10 mg) by mouth once daily.       anastrozole (Arimidex) 1 mg tablet Take 1 tablet (1 mg total) by mouth  once daily.       aspirin 81 mg EC tablet Take 1 tablet (81 mg) by mouth once daily.       cholecalciferol (Vitamin D-3) 50 MCG (2000 UT) tablet Take 1 tablet (2,000 Units) by mouth once daily.       cyanocobalamin (Vitamin B-12) 1,000 mcg tablet Take 1 tablet (1,000 mcg) by mouth once daily.       levothyroxine (Synthroid) 75 mcg tablet Take 1 tablet (75 mcg) by mouth once a day on Monday, Wednesday, and Friday.       atorvastatin (Lipitor) 20 mg tablet Take 1 tablet (20 mg) by mouth once daily.       levothyroxine (Synthroid, Levoxyl) 75 mcg tablet Take 0.5 tablets (37.5 mcg) by mouth once a day on Sunday, Tuesday, Thursday, and Saturday.          Review of Systems  Neurological Exam  Mental Status  Awake, alert and oriented to person, place and time. Oriented to person, place and time. Recent and remote memory are intact. Speech is normal. Language is fluent with no aphasia. Attention and concentration are normal. Fund of knowledge is appropriate for level of education.    Cranial Nerves  CN II: Visual acuity is normal. Visual fields full to confrontation.  CN III, IV, VI: Extraocular movements intact bilaterally.  CN V: Facial sensation is normal.  CN VII: Full and symmetric facial movement.  CN VIII: Hearing is normal.  CN IX, X: Palate elevates symmetrically  CN XI: Shoulder shrug strength is normal.  CN XII: Tongue midline without atrophy or fasciculations.    Motor  Normal muscle bulk throughout. No fasciculations present. Normal muscle tone. No abnormal involuntary movements. Strength is 5/5 throughout all four extremities.    Sensory  Light touch is normal in upper and lower extremities.     Coordination  Right: Rapid alternating movement normal.Left: Rapid alternating movement normal.    Gait    Not tested considering patient increased risk of fall and quite fragile.    Physical Exam  Eyes:      Extraocular Movements: Extraocular movements intact.   Neurological:      Motor: Motor strength is  "normal.  Psychiatric:         Speech: Speech normal.       Last Recorded Vitals  Blood pressure 160/69, pulse 60, temperature 36 °C (96.8 °F), resp. rate 16, height 1.753 m (5' 9.02\"), weight 51.3 kg (113 lb 1.5 oz), SpO2 99 %.    Relevant Results                    Marcell Coma Scale  Best Eye Response: Spontaneous  Best Verbal Response: Oriented  Best Motor Response: Follows commands  Marcell Coma Scale Score: 15                 I have personally reviewed the following imaging results CT abdomen pelvis wo IV contrast    Result Date: 11/30/2023  Interpreted By:  Darryl Osborn, STUDY: CT ABDOMEN PELVIS WO IV CONTRAST;  11/30/2023 3:57 am   INDICATION: Signs/Symptoms:sacral pain.   COMPARISON: 08/15/2022   ACCESSION NUMBER(S): [id number]   ORDERING CLINICIAN: DEBBIE FLORES   TECHNIQUE: CT of the abdomen and pelvis was performed. Contiguous axial images were obtained at 3 mm slice thickness through the abdomen and pelvis. Coronal and sagittal reconstructions at 3 mm slice thickness were performed.  Intravenous or oral contrast was not administered.   FINDINGS: LOWER CHEST: Small bilateral pleural effusions with compressive atelectasis. Radiodensity at the right coronary artery would indicate atherosclerotic calcification or stent.   ABDOMEN:   LIVER: There is increased attenuation of the liver indicating hemosiderosis or possibly amiodarone therapy. The dome of the liver was not included, the patient was not recalled for additional imaging at this time due to COVID precautions. If there is clinical concern of hepatic pathology additional imaging possible could be obtained if requested. The hepatic parenchyma otherwise is homogeneous. The hepatic size is normal.   SPLEEN: The spleen is normal in size and homogeneous.   ADRENAL GLANDS: Bilateral adrenal glands appear normal.   KIDNEYS AND URETERS: There is moderate left renal cortical atrophy, measuring 7.8 cm in length. There are several left renal cortical cysts. The " right renal cortex is maintained.   There are several nonobstructing intrarenal calculi, the largest measuring 8 mm at the right interpole. The distal right ureteral course is poorly visualized due to crowded overlying bowel loops. Otherwise no identified urinary tract dilatation or radiodense calculi.   PANCREAS: Again as previously there are multiple hypodensities, the largest measuring 2.1 cm at the neck of relative fluid attenuation indicating cyst or cystic/mucinous neoplasm. No ductal dilatation.   GALLBLADDER: No radiodense calculi, wall thickening or pericholecystic fluid.   BILE DUCTS: There is no biliary dilatation or filling defects.     VESSELS: Again there is an aortic-bi iliac endovascular stent extending through a bimodal abdominal aortic aneurysm. The latter measures up to 3.9 cm at the L3 level, with more saccular dilatation measuring 4.7 cm at the bifurcation, similar to the prior exam.   PERITONEUM AND RETROPERITONEUM: Trace ascites is seen at the pelvis. No free intraperitoneal air.   The retroperitoneum appears unremarkable, and without significant adenopathy.   No enlarged mesenteric lymph nodes.   BOWEL: Fecal residue seen throughout the colon which is mildly dilated, greatest at the rectosigmoid colon measuring 6.6 cm in diameter. This is most likely due to constipation probably early impaction. There is mild reticulation of the presacral space probably from dependent edema. The stomach and small bowel segments appear unremarkable.   PELVIS:   BLADDER: The urinary bladder contour is smooth.   REPRODUCTIVE ORGANS: Status post hysterectomy.     BONE, ABDOMINAL WALL AND OTHER FINDINGS: Of note is sclerosis of the S5 vertebral body not evident on the previous exam. This is nonspecific, but may be due to chronic osteomyelitis as there is a left paramedian subcutaneous/soft tissue fistula tract from approximately this level to the tip of the coccyx is slightly more inferiorly to the inferior margin  of the gluteal fold best seen on image numbers 108 through 132 of series 201. associated subcutaneous reticulation extends more superiorly to the S3 level. However, as the fistula tract itself is not appear to contact the S5 vertebral body, sclerosis due to developing stress fracture is not excluded. A sclerotic metastatic lesion is considered unlikely as there are no other bony sclerotic foci, but not completely excluded. No evidence of additional osteomyelitis including the coccyx. However if more definitive exclusion of such is needed correlation with MRI and radionuclide imaging as warranted would be recommended.   The abdominal wall soft tissues appear normal.       1.  Sclerosis of the S5 vertebral body, suspicious for chronic osteomyelitis as there is a locoregional superficial subcutaneous fistula tract as described. 2. Increased attenuation of the hepatic parenchyma, probably due to hemosiderosis, or can occur with amiodarone therapy. The dome of the liver was not included. 3. Trace ascites. 4. Probable constipation and possibly impaction. 5. Redemonstration of pancreatic cyst or cystic/mucinous neoplasms. 6. Moderate left renal cortical atrophy with bilateral nonobstructing intrarenal calculi. 7. Stable abdominal aortic aneurysms with endovascular stents.   MACRO: 1. None   Signed by: Darryl Osborn 11/30/2023 9:13 AM Dictation workstation:   [id number]    XR sacrum coccyx 2+ views    Result Date: 11/28/2023  Interpreted By:  Darnell Contreras, STUDY: XR SACRUM COCCYX 2+ VIEWS; ;  11/28/2023 12:55 pm   INDICATION: Signs/Symptoms:PAIN.   COMPARISON: X-ray lumbar spine 06/24/2018   ACCESSION NUMBER(S): [id number]   ORDERING CLINICIAN: DEBBIE FLORES   FINDINGS: Three view sacrum/coccyx   On the lateral projection, there is deformity/irregularity and discontinuity of anterior and posterior cortical margins at the lower sacrum with apparent associated cortical lucency particularly posteriorly new since prior.  There is some regional soft tissue swelling. Degenerative changes visualized lower lumbar spine. There is also irregularity at the left superior pubic ramus on 1 projection. Mild degenerative changes of the visualized hip joints mild patchy sclerotic appearance at the femoral heads bilaterally left greater than right. There is also focal sclerotic appearance at the mid to lower sacrum centrally on the AP projection. Evaluation of sacrum and pelvic bones elsewhere otherwise limited by overlying bowel gas and fecal material. Partially imaged stent grafts in the lower aorta and iliac arteries.   Multiple calcific densities overlie the pelvis probably phleboliths and/or vascular calcifications. Curvilinear calcification partially imaged overlying the lower abdomen about the stent material on the left probably large peripherally calcified inferior abdominal aortic aneurysmal sac which was present previously but otherwise limited evaluation on this examination.       Deformity/irregularity with apparent cortical discontinuity at the lower sacrum as described new since prior and concerning for sequela of fracture of indeterminate age. Focal sclerotic appearance at the mid to lower sacrum centrally on the AP projection of uncertain significance but could be related. There is some regional soft tissue swelling. Underlying infectious/inflammatory process cannot be excluded. There is also irregularity at the superior left pubic ramus on 1 projection of uncertain significance but with underlying nondisplaced fracture not excluded. Clinical correlation and follow-up advised and consider dedicated CT or MRI for further assessment.   Patchy sclerotic appearance at the femoral heads bilaterally left greater than right can be seen in the setting of avascular necrosis. This could also be further evaluated by CT or MRI.   Partially imaged aorto bi-iliac stent graft. Curvilinear calcification extending to the left at the lower abdomen  likely partially imaged peripherally calcified large abdominal aortic aneurysmal sac which was present previously but otherwise evaluation limited on this examination.   MACRO: None   Signed by: Darnell Contreras 11/28/2023 1:35 PM Dictation workstation:   [id number]    XR chest 1 view    Result Date: 11/26/2023  Interpreted By:  Prosper Olivas, STUDY: XR CHEST 1 VIEW;  11/26/2023 10:54 am   INDICATION: Signs/Symptoms:Weakness.   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  Patient's symptoms of tremor not at rest but present while moving and standing up suggestive of orthostatic tremor.  Other possibilities include Parkinson induced tremor, essential tremor.  Consider these to be less likely considering patient's history and presentation.  Options for this type of tremor include clonazepam, propranolol.  Considering patient's age would like to refrain from adding these medications for the time being..  -Continue treating for COVID  -PT/OT, placement if indicated  -Would like patient to return to baseline and follow-up neurology outpatient clinic if symptoms still persistent then can consider adding medications to treat patient's tremor.  - Dr. Alejandra Lim MD  [facility]  [address] [facility]  [address] [place] [address], [address]    Scheduling Number: [phone]         Marcelo Pate MD

## 2023-11-30 NOTE — PROGRESS NOTES
"Vancomycin Dosing by Pharmacy- FOLLOW UP    Regi Leger is a 89 y.o. year old female admitted for   1. Generalized weakness        2. Urinary tract infection without hematuria, site unspecified        . Pharmacy has been consulted to dose the vancomycin for {Vancomycin Consult Indication:45418} with a goal AUC of {AUC Vanco:06064}.     Renal function is currently {Stable/Declining/Improvin}.    Current vancomycin dose: 1000 mg given every 24 hours        Lab Results   Component Value Date    CREATININE 0.91 2023    CREATININE 0.93 2023    CREATININE 0.84 2023    CREATININE 0.83 2023        Patient weight is No results found for: \"PTWEIGHT\"     This dosing regimen is predicted by [facility] to result in the following pharmacokinetic parameters:  Regimen: 1000 mg IV every 24 hours.  Start time: 17:43 on 2023  Exposure target: AUC24 (range)400-600 mg/L.hr   AUC24,ss: 581 mg/L.hr  Probability of AUC24 > 400: 88 %  Ctrough,ss: 18.3 mg/L  Probability of Ctrough,ss > 20: 40 %  Probability of nephrotoxicity (Lodise EMANI ): 15 %            Daneila Tello, PharmD     Regimen: 1000 mg IV every 24 hours.  Start time: 17:43 on 2023  Exposure target: AUC24 (range)400-600 mg/L.hr   AUC24,ss: 581 mg/L.hr  Probability of AUC24 > 400: 88 %  Ctrough,ss: 18.3 mg/L  Probability of Ctrough,ss > 20: 40 %  Probability of nephrotoxicity (Lodise EMANI ): 15 %  "

## 2023-11-30 NOTE — CONSULTS
"Reason For Consult  SACRAL PAIN    History Of Present Illness  Rgei Leger is a 89 y.o. female presenting with LBP.  DENIES FALL.  LIVES WITH FAMILY AND AMBULATES WITH WALKER.       Past Medical History  She has a past medical history of Abnormal findings on diagnostic imaging of other abdominal regions, including retroperitoneum, Abnormal findings on diagnostic imaging of other abdominal regions, including retroperitoneum, Abnormal findings on diagnostic imaging of other specified body structures, Abnormal findings on diagnostic imaging of other specified body structures, Old myocardial infarction (12/09/2019), Personal history of other diseases of the nervous system and sense organs, Personal history of other medical treatment, Personal history of other medical treatment, Personal history of other specified conditions, and Personal history of urinary calculi (12/09/2019).    Surgical History  She has a past surgical history that includes Other surgical history (12/09/2019); Other surgical history (12/09/2019); Other surgical history (12/09/2019); Other surgical history (12/09/2019); Other surgical history (01/13/2020); Other surgical history (01/13/2020); Other surgical history (08/04/2020); Other surgical history (12/09/2019); CT angio abdomen pelvis w and or wo IV IV contrast (12/9/2019); CT angio abdomen pelvis w and or wo IV IV contrast (12/16/2021); and CT angio abdomen pelvis w and or wo IV IV contrast (8/15/2022).     Social History  She reports that she has never smoked. She has never used smokeless tobacco. She reports that she does not drink alcohol and does not use drugs.    Family History  No family history on file.     Allergies  Pravastatin and Penicillins    Review of System   Physical Exam  PAINLESS ROM OF HIPS AND NV INTACT.  SL TENDERNESS OVER LEFT SIDED SACRUM     Last Recorded Vitals  Blood pressure 160/69, pulse 60, temperature 36 °C (96.8 °F), resp. rate 16, height 1.753 m (5' 9.02\"), " weight 51.3 kg (113 lb 1.5 oz), SpO2 99 %.    Relevant Results    XRAYS A ND CT SCAN REVIEWED.  XRAYS SHOW CORTICAL IRREGULARITY OF SACRUM WHICH I DO NOT SEE ON CT SCAN, REPORT PENDING   Assessment/Plan     LBP WITH POSSIBLE SACRAL FX, AWAIT CT SCAN RESULTS.  EITHER WAY COULD MOBILIZE WBAT WITH WALKER    THANK YOU          Phil Curtis MD

## 2023-12-01 LAB
ALBUMIN SERPL BCP-MCNC: 3.4 G/DL (ref 3.4–5)
ALP SERPL-CCNC: 56 U/L (ref 33–136)
ALT SERPL W P-5'-P-CCNC: 12 U/L (ref 7–45)
ANION GAP SERPL CALC-SCNC: 11 MMOL/L (ref 10–20)
AST SERPL W P-5'-P-CCNC: 15 U/L (ref 9–39)
BILIRUB SERPL-MCNC: 0.3 MG/DL (ref 0–1.2)
BUN SERPL-MCNC: 39 MG/DL (ref 6–23)
CALCIUM SERPL-MCNC: 8.9 MG/DL (ref 8.6–10.3)
CHLORIDE SERPL-SCNC: 97 MMOL/L (ref 98–107)
CO2 SERPL-SCNC: 29 MMOL/L (ref 21–32)
CREAT SERPL-MCNC: 1.15 MG/DL (ref 0.5–1.05)
CRP SERPL-MCNC: 0.36 MG/DL
ERYTHROCYTE [DISTWIDTH] IN BLOOD BY AUTOMATED COUNT: 13.6 % (ref 11.5–14.5)
ERYTHROCYTE [SEDIMENTATION RATE] IN BLOOD BY WESTERGREN METHOD: 19 MM/H (ref 0–30)
GFR SERPL CREATININE-BSD FRML MDRD: 45 ML/MIN/1.73M*2
GLUCOSE SERPL-MCNC: 90 MG/DL (ref 74–99)
HCT VFR BLD AUTO: 31.8 % (ref 36–46)
HGB BLD-MCNC: 10 G/DL (ref 12–16)
MCH RBC QN AUTO: 30.2 PG (ref 26–34)
MCHC RBC AUTO-ENTMCNC: 31.4 G/DL (ref 32–36)
MCV RBC AUTO: 96 FL (ref 80–100)
NRBC BLD-RTO: 0 /100 WBCS (ref 0–0)
PLATELET # BLD AUTO: 248 X10*3/UL (ref 150–450)
POTASSIUM SERPL-SCNC: 5.1 MMOL/L (ref 3.5–5.3)
PROT SERPL-MCNC: 6 G/DL (ref 6.4–8.2)
RBC # BLD AUTO: 3.31 X10*6/UL (ref 4–5.2)
SODIUM SERPL-SCNC: 132 MMOL/L (ref 136–145)
VANCOMYCIN TROUGH SERPL-MCNC: 8 UG/ML (ref 5–20)
WBC # BLD AUTO: 7.3 X10*3/UL (ref 4.4–11.3)

## 2023-12-01 PROCEDURE — 86140 C-REACTIVE PROTEIN: CPT | Performed by: INTERNAL MEDICINE

## 2023-12-01 PROCEDURE — 2500000004 HC RX 250 GENERAL PHARMACY W/ HCPCS (ALT 636 FOR OP/ED): Performed by: INTERNAL MEDICINE

## 2023-12-01 PROCEDURE — 36415 COLL VENOUS BLD VENIPUNCTURE: CPT | Performed by: INTERNAL MEDICINE

## 2023-12-01 PROCEDURE — 2500000001 HC RX 250 WO HCPCS SELF ADMINISTERED DRUGS (ALT 637 FOR MEDICARE OP): Performed by: NURSE PRACTITIONER

## 2023-12-01 PROCEDURE — 85652 RBC SED RATE AUTOMATED: CPT | Performed by: INTERNAL MEDICINE

## 2023-12-01 PROCEDURE — 2500000005 HC RX 250 GENERAL PHARMACY W/O HCPCS: Performed by: INTERNAL MEDICINE

## 2023-12-01 PROCEDURE — 80053 COMPREHEN METABOLIC PANEL: CPT | Performed by: INTERNAL MEDICINE

## 2023-12-01 PROCEDURE — 85027 COMPLETE CBC AUTOMATED: CPT | Performed by: INTERNAL MEDICINE

## 2023-12-01 PROCEDURE — 80202 ASSAY OF VANCOMYCIN: CPT | Performed by: INTERNAL MEDICINE

## 2023-12-01 PROCEDURE — 2500000004 HC RX 250 GENERAL PHARMACY W/ HCPCS (ALT 636 FOR OP/ED): Performed by: NURSE PRACTITIONER

## 2023-12-01 PROCEDURE — 96372 THER/PROPH/DIAG INJ SC/IM: CPT | Performed by: NURSE PRACTITIONER

## 2023-12-01 PROCEDURE — 1100000001 HC PRIVATE ROOM DAILY

## 2023-12-01 RX ORDER — MEROPENEM 1 G/1
1000 INJECTION, POWDER, FOR SOLUTION INTRAVENOUS EVERY 12 HOURS
Status: DISCONTINUED | OUTPATIENT
Start: 2023-12-01 | End: 2023-12-08 | Stop reason: HOSPADM

## 2023-12-01 RX ORDER — VANCOMYCIN HYDROCHLORIDE 750 MG/150ML
750 INJECTION, SOLUTION INTRAVENOUS EVERY 24 HOURS
Status: DISCONTINUED | OUTPATIENT
Start: 2023-12-01 | End: 2023-12-03

## 2023-12-01 RX ADMIN — CYANOCOBALAMIN TAB 1000 MCG 1000 MCG: 1000 TAB at 09:31

## 2023-12-01 RX ADMIN — LEVOTHYROXINE SODIUM 75 MCG: 0.07 TABLET ORAL at 14:49

## 2023-12-01 RX ADMIN — Medication 1 TABLET: at 09:00

## 2023-12-01 RX ADMIN — MEROPENEM 1000 MG: 1 INJECTION, POWDER, FOR SOLUTION INTRAVENOUS at 13:06

## 2023-12-01 RX ADMIN — ATORVASTATIN CALCIUM 20 MG: 20 TABLET, FILM COATED ORAL at 09:31

## 2023-12-01 RX ADMIN — LIDOCAINE 1 PATCH: 4 PATCH TOPICAL at 09:31

## 2023-12-01 RX ADMIN — ENOXAPARIN SODIUM 30 MG: 30 INJECTION SUBCUTANEOUS at 14:49

## 2023-12-01 RX ADMIN — POLYETHYLENE GLYCOL 3350 17 G: 17 POWDER, FOR SOLUTION ORAL at 09:31

## 2023-12-01 RX ADMIN — Medication 1 TABLET: at 21:07

## 2023-12-01 RX ADMIN — ACETAMINOPHEN 650 MG: 325 TABLET ORAL at 21:07

## 2023-12-01 RX ADMIN — AMLODIPINE BESYLATE 10 MG: 10 TABLET ORAL at 09:31

## 2023-12-01 RX ADMIN — DEXAMETHASONE 6 MG: 6 TABLET ORAL at 09:31

## 2023-12-01 RX ADMIN — VANCOMYCIN HYDROCHLORIDE 750 MG: 10 INJECTION, POWDER, LYOPHILIZED, FOR SOLUTION INTRAVENOUS at 21:07

## 2023-12-01 RX ADMIN — ANASTROZOLE 1 MG: 1 TABLET, COATED ORAL at 09:31

## 2023-12-01 RX ADMIN — CHOLECALCIFEROL TAB 25 MCG (1000 UNIT) 2000 UNITS: 25 TAB at 09:31

## 2023-12-01 RX ADMIN — ASPIRIN 81 MG: 81 TABLET, COATED ORAL at 09:31

## 2023-12-01 RX ADMIN — MEROPENEM 1000 MG: 1 INJECTION, POWDER, FOR SOLUTION INTRAVENOUS at 22:30

## 2023-12-01 ASSESSMENT — PAIN - FUNCTIONAL ASSESSMENT
PAIN_FUNCTIONAL_ASSESSMENT: 0-10

## 2023-12-01 ASSESSMENT — PAIN SCALES - GENERAL
PAINLEVEL_OUTOF10: 0 - NO PAIN
PAINLEVEL_OUTOF10: 3
PAINLEVEL_OUTOF10: 0 - NO PAIN

## 2023-12-01 NOTE — PROGRESS NOTES
"Vancomycin Dosing by Pharmacy- FOLLOW UP    Regi Leger is a 89 y.o. year old female who Pharmacy has been consulted for vancomycin dosing for osteomyelitis/septic arthritis. Based on the patient's indication and renal status this patient is being dosed based on a goal AUC of 400-600.     Renal function is currently declining.    Current vancomycin dose: 1000 mg given every 24 hours    Estimated vancomycin AUC on current dose: 698 mg/L.hr     Visit Vitals  /70 (BP Location: Right arm, Patient Position: Lying)   Pulse 57   Temp 36.4 °C (97.5 °F) (Temporal)   Resp 18        Lab Results   Component Value Date    CREATININE 1.15 (H) 12/01/2023    CREATININE 0.91 11/30/2023    CREATININE 0.93 11/29/2023    CREATININE 0.84 11/28/2023        Patient weight is No results found for: \"PTWEIGHT\"    No results found for: \"CULTURE\"     I/O last 3 completed shifts:  In: 200 (3.9 mL/kg) [IV Piggyback:200]  Out: 2400 (46.8 mL/kg) [Urine:2400 (1.3 mL/kg/hr)]  Weight: 51.3 kg   [unfilled]    Lab Results   Component Value Date    PATIENTTEMP 37.0 09/16/2019        Assessment/Plan    Above goal AUC. Orders placed for new vancomcyin regimen of 750 every 12 hours to begin at 1500.    This dosing regimen is predicted by [facility] to result in the following pharmacokinetic parameters:  AUC24,ss: 531 mg/L.hr  Probability of AUC24 > 400: 92 %  Ctrough,ss: 17.9 mg/L  Probability of Ctrough,ss > 20: 33 %  Probability of nephrotoxicity (Lodise EMANI 2009): 14 %    The next level will be obtained on 12/2 at Mid draw. May be obtained sooner if clinically indicated.   Will continue to monitor renal function daily while on vancomycin and order serum creatinine at least every 48 hours if not already ordered.  Follow for continued vancomycin needs, clinical response, and signs/symptoms of toxicity.   If renal fxn continues to worsen will need to change to trough based dosing      Daniel J Weiland, PharmD           "

## 2023-12-01 NOTE — PROGRESS NOTES
Regi Leger is a 89 y.o. female on day 3 of admission presenting with UTI (urinary tract infection).      Subjective   Patient fully evaluated on November 27.  Improved but still very weak and probably will require skilled nursing.       Objective     Last Recorded Vitals  /63   Pulse 69   Temp 36.8 °C (98.2 °F)   Resp 18   Wt 51.3 kg (113 lb 1.5 oz)   SpO2 94%   Intake/Output last 3 Shifts:    Intake/Output Summary (Last 24 hours) at 12/1/2023 1507  Last data filed at 12/1/2023 0600  Gross per 24 hour   Intake 200 ml   Output 1600 ml   Net -1400 ml         Admission Weight  Weight: 51.3 kg (113 lb) (11/26/23 0916)    Daily Weight  11/30/23 : 51.3 kg (113 lb 1.5 oz)    Image Results  CT abdomen pelvis wo IV contrast  Narrative: Interpreted By:  Darryl Osborn,   STUDY:  CT ABDOMEN PELVIS WO IV CONTRAST;  11/30/2023 3:57 am      INDICATION:  Signs/Symptoms:sacral pain.      COMPARISON:  08/15/2022      ACCESSION NUMBER(S):  [id number]      ORDERING CLINICIAN:  DEBBIE FLORES      TECHNIQUE:  CT of the abdomen and pelvis was performed. Contiguous axial images  were obtained at 3 mm slice thickness through the abdomen and pelvis.  Coronal and sagittal reconstructions at 3 mm slice thickness were  performed.  Intravenous or oral contrast was not administered.      FINDINGS:  LOWER CHEST:  Small bilateral pleural effusions with compressive atelectasis.  Radiodensity at the right coronary artery would indicate  atherosclerotic calcification or stent.      ABDOMEN:      LIVER:  There is increased attenuation of the liver indicating hemosiderosis  or possibly amiodarone therapy. The dome of the liver was not  included, the patient was not recalled for additional imaging at this  time due to COVID precautions. If there is clinical concern of  hepatic pathology additional imaging possible could be obtained if  requested. The hepatic parenchyma otherwise is homogeneous. The  hepatic size is normal.       SPLEEN:  The spleen is normal in size and homogeneous.      ADRENAL GLANDS:  Bilateral adrenal glands appear normal.      KIDNEYS AND URETERS:  There is moderate left renal cortical atrophy, measuring 7.8 cm in  length. There are several left renal cortical cysts. The right renal  cortex is maintained.      There are several nonobstructing intrarenal calculi, the largest  measuring 8 mm at the right interpole. The distal right ureteral  course is poorly visualized due to crowded overlying bowel loops.  Otherwise no identified urinary tract dilatation or radiodense  calculi.      PANCREAS:  Again as previously there are multiple hypodensities, the largest  measuring 2.1 cm at the neck of relative fluid attenuation indicating  cyst or cystic/mucinous neoplasm. No ductal dilatation.      GALLBLADDER:  No radiodense calculi, wall thickening or pericholecystic fluid.      BILE DUCTS:  There is no biliary dilatation or filling defects.          VESSELS:  Again there is an aortic-bi iliac endovascular stent extending  through a bimodal abdominal aortic aneurysm. The latter measures up  to 3.9 cm at the L3 level, with more saccular dilatation measuring  4.7 cm at the bifurcation, similar to the prior exam.      PERITONEUM AND RETROPERITONEUM:  Trace ascites is seen at the pelvis. No free intraperitoneal air.      The retroperitoneum appears unremarkable, and without significant  adenopathy.      No enlarged mesenteric lymph nodes.      BOWEL:  Fecal residue seen throughout the colon which is mildly dilated,  greatest at the rectosigmoid colon measuring 6.6 cm in diameter. This  is most likely due to constipation probably early impaction. There is  mild reticulation of the presacral space probably from dependent  edema. The stomach and small bowel segments appear unremarkable.      PELVIS:      BLADDER:  The urinary bladder contour is smooth.      REPRODUCTIVE ORGANS:  Status post hysterectomy.          BONE, ABDOMINAL  WALL AND OTHER FINDINGS:  Of note is sclerosis of the S5 vertebral body not evident on the  previous exam. This is nonspecific, but may be due to chronic  osteomyelitis as there is a left paramedian subcutaneous/soft tissue  fistula tract from approximately this level to the tip of the coccyx  is slightly more inferiorly to the inferior margin of the gluteal  fold best seen on image numbers 108 through 132 of series 201.  associated subcutaneous reticulation extends more superiorly to the  S3 level. However, as the fistula tract itself is not appear to  contact the S5 vertebral body, sclerosis due to developing stress  fracture is not excluded. A sclerotic metastatic lesion is considered  unlikely as there are no other bony sclerotic foci, but not  completely excluded. No evidence of additional osteomyelitis  including the coccyx. However if more definitive exclusion of such is  needed correlation with MRI and radionuclide imaging as warranted  would be recommended.      The abdominal wall soft tissues appear normal.      Impression: 1.  Sclerosis of the S5 vertebral body, suspicious for chronic  osteomyelitis as there is a locoregional superficial subcutaneous  fistula tract as described.  2. Increased attenuation of the hepatic parenchyma, probably due to  hemosiderosis, or can occur with amiodarone therapy. The dome of the  liver was not included.  3. Trace ascites.  4. Probable constipation and possibly impaction.  5. Redemonstration of pancreatic cyst or cystic/mucinous neoplasms.  6. Moderate left renal cortical atrophy with bilateral nonobstructing  intrarenal calculi.  7. Stable abdominal aortic aneurysms with endovascular stents.      MACRO:  1. None      Signed by: Darryl Osborn 11/30/2023 9:13 AM  Dictation workstation:   [id number]      Physical Exam    Relevant Results               Assessment/Plan                  Varghese Vasquez MD  Physician  Internal Medicine     H&P      Addendum     Date of  Service: 11/26/2023  2:46 PM     Addendum       Expand All Collapse All    History Of Present Illness  Regi Leger is a 89 y.o. female with past medical history of abdominal aortic aneurysm s/p repair (01/2022), endoleak of aortic graft (no intervention pursued per patient's wishes), severe aortic stenosis, CAD s/p PCI/stent RCA, hypertension, hyperlipidemia, hypothyroidism, vocal cord paralysis following thyroidectomy,  CKD stage III, nephrolithiasis, urinary retention, PVD, anemia, osteoarthritis, DJD, breast cancer, and skin cancer who presented today with weakness.  HPI somewhat limited due to patient is hard of hearing.  She reports over the past few days she has been experiencing progressively worsening weakness.  She reports she became concerned that she may fall so she came to the emergency room for evaluation.  She admits to associated body aches that she has been taking over-the-counter medicine for.  She denies any fevers, chills, chest pain, shortness of breath, cough, abdominal pain, nausea, vomiting, diarrhea, or dysuria, changes in her chronic urinary incontinence.     In the ED lab work, EKG, and chest x-ray were performed. Labs revealed glucose 102, sodium 135 (chronically mildly low), bun 26 (chronic), , troponin 20, glucose 22, chronic anemia noted with red blood cell count 2.44, hemoglobin 10.5, and hematocrit 32.8, neutrophils 5.98, lymphocytes 0.74 UA revealed yellow hazy urine with moderate blood urobilinogen 2, large leuks.  Chest x-ray revealed no acute process.  EKG, per ER physician impression revealed normal sinus rhythm at a rate of 67 with nonspecific changes.  Vital signs were stable while in the ED.  She was given Rocephin and admitted to Dr. Vasquez.     10 point ROS negative except as noted above in HPI     Past medical history: As above  Past surgical history: Thyroidectomy, hysterectomy, PTCA/PCI with stent to RCA, AAA repair, cataract surgery  Social history: No history  "of smoking, alcohol abuse, or illicit drug use.  Lives with sister and nephew.  Ambulates with walker.  Family history: Father-stomach cancer; mother-myocardial infarction     Allergies  Pravastatin and Penicillins     Physical Exam  Constitutional:       Comments: Thin   HENT:      Head: Normocephalic and atraumatic.      Mouth/Throat:      Mouth: Mucous membranes are dry.   Eyes:      Conjunctiva/sclera: Conjunctivae normal.   Cardiovascular:      Rate and Rhythm: Normal rate and regular rhythm.      Heart sounds: Murmur heard.   Pulmonary:      Effort: Pulmonary effort is normal.      Breath sounds: No rales.   Abdominal:      General: Abdomen is flat. Bowel sounds are normal.      Palpations: Abdomen is soft.   Musculoskeletal:         General: Normal range of motion.      Cervical back: Neck supple.   Neurological:      Mental Status: She is alert. Mental status is at baseline.   Psychiatric:         Mood and Affect: Mood normal.            Last Recorded Vitals  Blood pressure (!) 155/97, pulse 77, temperature 36.5 °C (97.7 °F), temperature source Tympanic, resp. rate 25, height 1.727 m (5' 8\"), weight 51.3 kg (113 lb), SpO2 97 %.     Relevant Results     No current facility-administered medications on file prior to encounter.             Current Outpatient Medications on File Prior to Encounter   Medication Sig Dispense Refill    amLODIPine (Norvasc) 10 mg tablet Take 1 tablet (10 mg) by mouth once daily.        anastrozole (Arimidex) 1 mg tablet Take 1 tablet (1 mg total) by mouth once daily.        aspirin 81 mg EC tablet Take 1 tablet (81 mg) by mouth once daily.        cholecalciferol (Vitamin D-3) 50 MCG (2000 UT) tablet Take 1 tablet (2,000 Units) by mouth once daily.        cyanocobalamin (Vitamin B-12) 1,000 mcg tablet Take 1 tablet (1,000 mcg) by mouth once daily.        levothyroxine (Synthroid) 75 mcg tablet Take 1 tablet (75 mcg) by mouth once a day on Monday, Wednesday, and Friday.        " atorvastatin (Lipitor) 20 mg tablet Take 1 tablet (20 mg) by mouth once daily.        levothyroxine (Synthroid, Levoxyl) 75 mcg tablet Take 0.5 tablets (37.5 mcg) by mouth once a day on Sunday, Tuesday, Thursday, and Saturday.                Results for orders placed or performed during the hospital encounter of 11/26/23 (from the past 24 hour(s))   CBC and Auto Differential   Result Value Ref Range     WBC 7.6 4.4 - 11.3 x10*3/uL     nRBC 0.0 0.0 - 0.0 /100 WBCs     RBC 3.44 (L) 4.00 - 5.20 x10*6/uL     Hemoglobin 10.5 (L) 12.0 - 16.0 g/dL     Hematocrit 32.8 (L) 36.0 - 46.0 %     MCV 95 80 - 100 fL     MCH 30.5 26.0 - 34.0 pg     MCHC 32.0 32.0 - 36.0 g/dL     RDW 13.9 11.5 - 14.5 %     Platelets 277 150 - 450 x10*3/uL     Neutrophils % 78.5 40.0 - 80.0 %     Immature Granulocytes %, Automated 0.4 0.0 - 0.9 %     Lymphocytes % 9.7 13.0 - 44.0 %     Monocytes % 8.7 2.0 - 10.0 %     Eosinophils % 2.0 0.0 - 6.0 %     Basophils % 0.7 0.0 - 2.0 %     Neutrophils Absolute 5.98 (H) 1.60 - 5.50 x10*3/uL     Immature Granulocytes Absolute, Automated 0.03 0.00 - 0.50 x10*3/uL     Lymphocytes Absolute 0.74 (L) 0.80 - 3.00 x10*3/uL     Monocytes Absolute 0.66 0.05 - 0.80 x10*3/uL     Eosinophils Absolute 0.15 0.00 - 0.40 x10*3/uL     Basophils Absolute 0.05 0.00 - 0.10 x10*3/uL   Comprehensive metabolic panel   Result Value Ref Range     Glucose 102 (H) 74 - 99 mg/dL     Sodium 135 (L) 136 - 145 mmol/L     Potassium 4.5 3.5 - 5.3 mmol/L     Chloride 99 98 - 107 mmol/L     Bicarbonate 29 21 - 32 mmol/L     Anion Gap 12 10 - 20 mmol/L     Urea Nitrogen 26 (H) 6 - 23 mg/dL     Creatinine 0.91 0.50 - 1.05 mg/dL     eGFR 59 (L) >60 mL/min/1.73m*2     Calcium 9.3 8.6 - 10.3 mg/dL     Albumin 3.8 3.4 - 5.0 g/dL     Alkaline Phosphatase 73 33 - 136 U/L     Total Protein 7.3 6.4 - 8.2 g/dL     AST 20 9 - 39 U/L     Bilirubin, Total 0.4 0.0 - 1.2 mg/dL     ALT 11 7 - 45 U/L   Troponin I, High Sensitivity   Result Value Ref Range      Troponin I, High Sensitivity 20 (H) 0 - 13 ng/L   B-Type Natriuretic Peptide   Result Value Ref Range      (H) 0 - 99 pg/mL   Urinalysis with Reflex Microscopic and Culture   Result Value Ref Range     Color, Urine Yellow Straw, Yellow     Appearance, Urine Hazy (N) Clear     Specific Gravity, Urine 1.015 1.005 - 1.035     pH, Urine 5.0 5.0, 5.5, 6.0, 6.5, 7.0, 7.5, 8.0     Protein, Urine NEGATIVE NEGATIVE mg/dL     Glucose, Urine NEGATIVE NEGATIVE mg/dL     Blood, Urine MODERATE (2+) (A) NEGATIVE     Ketones, Urine NEGATIVE NEGATIVE mg/dL     Bilirubin, Urine NEGATIVE NEGATIVE     Urobilinogen, Urine 2.0 (N) <2.0 mg/dL     Nitrite, Urine NEGATIVE NEGATIVE     Leukocyte Esterase, Urine LARGE (3+) (A) NEGATIVE   Extra Urine Gray Tube   Result Value Ref Range     Extra Tube Hold for add-ons.     Microscopic Only, Urine   Result Value Ref Range     WBC, Urine 21-50 (A) 1-5, NONE /HPF     RBC, Urine 3-5 NONE, 1-2, 3-5 /HPF     Squamous Epithelial Cells, Urine 1-9 (SPARSE) Reference range not established. /HPF     Bacteria, Urine 1+ (A) NONE SEEN /HPF         XR chest 1 view     Result Date: 11/26/2023  Interpreted By:  Prosper Olivas, STUDY: XR CHEST 1 VIEW;  11/26/2023 10:54 am   INDICATION: Signs/Symptoms:Weakness.   COMPARISON: Portable chest, 7 September 2023   ACCESSION NUMBER(S): [id number]   ORDERING CLINICIAN: NICKY SPENCER   TECHNIQUE: Single frontal view of the chest; Portable technique   FINDINGS:   The cardiomediastinal silhouette is unchanged   No consolidative lung opacity   No edema / failure   No large pleural effusion or demonstrable pneumothorax        NO ACUTE DISEASE IN THE CHEST   MACRO: None   Signed by: Prosper Olivas 11/26/2023 11:18 AM Dictation workstation:   [id number]          Assessment/Plan   Principal Problem:    UTI (urinary tract infection)  Body Aches   Elevated troponin  Weakness     Admit to [facility]  Observation  Continue rocephin  urine culture pending      CBC, BMP in  am  PT/OT  Repeat troponin and EKG     Chronic conditions: Hypertension, hyperlipidemia, hypothyroidism, coronary artery disease, PVD, breast cancer     Continue home medications as listed above     DVT prophylaxis     SCDs  Lovenox           I spent 45 minutes in the professional and overall care of this patient.     Patient fully evaluated plan as above.  Harmony Estrada, APRN-CNP                 Revision History    Patient fully evaluated on November 27 and Decadron orally added for myalgias.  Await urine culture continue IV Rocephin.  Recheck labs in AM.  Lidoderm patch to sacrum for pain      Principal Problem:    UTI (urinary tract infection)  Active Problems:    Generalized weakness          Patient fully evaluated on November 28.  Still with significant sacral pain.  X-rays to be ordered.  Continue present medications transition to oral antibiotics and monitor.     Patient fully evaluated on November 29.  Awaiting final urine sensitivity.  Formerly and sacrum noted on plain x-ray and orthopedic consultation pain.  CAT scan ordered at that area.  Neurology consultation for tremors.    Patient fully evaluated on November 30.  Results of CAT scan are noted and patient started on IV antibiotics pending further infectious disease and orthopedic recommendations.  Recheck labs in AM.    Patient fully evaluated on December 1.  Continue IV antibiotics.  His await orthopedic input if necessary.  Case discussed with infectious disease.  Recheck labs in AM.  Varghese Vasquez MD

## 2023-12-01 NOTE — CONSULTS
Note is only to complete original consult order placed for general neurology on 11/30/2023, which has already been completed by Dr. Alejandra Lim on 11/30/2023.  Please refer to original consult note for information.  Thank you.

## 2023-12-01 NOTE — CONSULTS
Consults  Referring physician Dr. Royal  History of present illness    Is a 89-year-old female who has multiple medical problems but came in for generalized weakness.  She had associated body aches and has been taking over-the-counter medicine for this.  She is also complaining of her coccyx area, tailbone area hurting her.  We are called for further antibiotic management for S5 chronic osteomyelitis and COVID-positive which she tested for on 11/26/2023.    Past medical history is significant for abdominal aortic aneurysm status postrepair on January 2022, endoleak of aortic graft no intervention per patient's wishes, severe aortic stenosis, CAD status post PCI stent of the RCA, hypertension, hyperlipidemia, hypothyroidism, vocal cord paralysis following thyroidectomy, CKD stage III, nephrolithiasis, urinary retention, PVD, anemia, osteoarthritis, DJD, breast cancer, skin cancer    Past surgical history thyroidectomy, hysterectomy, PTCA/PCI with stent to the RCA, AAA repair, cataract surgery  Social history no drinking smoking drug use lives at home with her sister and nephew and ambulates with a walker  Family history no sick contacts    Allergies penicillin unknown reaction    10 point review system was obtained with the patient denying any complaints outside of those listed in HPI above     on physical examination   Patient is on room air pulse oxing in the mid 90s  HEENT PERRLA  Chest fair air entry bilaterally  CVS S1-S2 regular  Abdomen soft nontender positive bowel sounds  Extremities positive pulses bilaterally    Labs and radiology reviewed    Impression:  89-year-old female with multiple medical problems now COVID-positive but asymptomatic.  She also has chronic osteomyelitis at the S5 area with a subcu fistula.  I will need to speak with the primary care physician to see how aggressive we need to be.  She is a full code but with all her medical issues we will see whether further antibiotics for 6 weeks  should be trialed for the osteomyelitis.  If she wishes treatment she is okay to go to an ECF I did ask her of this.  I did mention she will need a PICC line and long-term IV antibiotics.  At this time will recommend the following    Suggestion:  1.  Continue vancomycin but I will add in meropenem  2.  We will speak with the primary care physician to see if we should just do conservative therapy for her chronic osteomyelitis due to all her underlying medical issues or if aggressive therapy would be justified meaning 6 weeks of broad-spectrum IV antibiotics and an extended care facility placement    Thank you for this consult follow with you as needed  I have reviewed and interpreted all lab test imaging studies and documentations from other healthcare providers  I am monitoring antibiotics for any side effects and toxicity

## 2023-12-01 NOTE — PROGRESS NOTES
Patient is a 89 year old female who presents complaints of lower back pain. Initially it was thought for a possible fracture, but CT read as negative at this time. Patient is COVID + so patient was not seen and report was given from nurse. Nurse states that patient is still neurovascularly intact and that she does not seem to be getting worse nor improving. He states that he tried to help therapy get her to stand by the side of her bed yesterday, but that she was barely able to do so do to pain and instability with her balance. He states she is alert and oriented and has no further concerns.     - Afebrile     Temp Readings from Last 1 Encounters:   12/01/23 36.4 °C (97.5 °F) (Temporal)    , Visit Vitals  /70 (BP Location: Right arm, Patient Position: Lying)   Pulse 57   Temp 36.4 °C (97.5 °F) (Temporal)   Resp 18            Assessment:  - Patient has low back pain.  - Hgb level is 9.9  - CT scan read:   IMPRESSION:  1.  Sclerosis of the S5 vertebral body, suspicious for chronic  osteomyelitis as there is a locoregional superficial subcutaneous  fistula tract as described.  Lab Results   Component Value Date    HGB 9.9 (L) 11/30/2023        Lab Results   Component Value Date    INR 1.0 09/07/2023    INR 1.0 01/10/2022    INR 1.0 04/15/2020    PROTIME 11.8 09/07/2023    PROTIME 12.1 01/10/2022    PROTIME 11.2 04/15/2020        Plan:  - Continue to remain WBAT with walker.  - Ice PRN and manage pain control; if continues to have significant pain may consider MRI.  - PT/OT for gait  - SCD's; DVT ppx  - Discharge pending medical   - Discussed with attending who agreed with plan above due to negative CT.

## 2023-12-01 NOTE — CONSULTS
"Nutrition Note  Reason for Assessment  Reason for Assessment:  (follow up)      Recommendation(s):  Continue bite sized texture diet per pt need, continue ensure plus high protein X 2 to help meet nutritional needs.         Assessment    Subjective Data  Food and Nutrient History: Pt continues to eat well.  Her diet was changed to Bite sized which is still soft and working better for her.  Continue Ensure plus high protein X 2 to help meet nutritional needs.      Difficulty chewing: Pt requested a soft diet     Objective Data  Per Flowsheet Percent Meal intake:    Dietary Orders (From admission, onward)       Start     Ordered    11/30/23 1642  Adult diet Regular; Bite size food 6; Thin 0  Diet effective now        Comments: Pt requests sot diet   Question Answer Comment   Diet type Regular    Texture Bite size food 6    Fluid consistency Thin 0        11/30/23 1642    11/27/23 1735  Oral nutritional supplements  Until discontinued        Question Answer Comment   Deliver with Lunch    Deliver with Dinner    Select supplement: Ensure Plus High Protein        11/27/23 1735                  Independent after set-up  Results from last 7 days   Lab Units 11/30/23  0513 11/29/23  0717 11/28/23  0647   GLUCOSE mg/dL 107* 101* 126*   SODIUM mmol/L 133* 135* 132*   POTASSIUM mmol/L 4.9 4.9 5.4*   CHLORIDE mmol/L 98 100 100   CO2 mmol/L 28 27 23   BUN mg/dL 35* 36* 27*   CREATININE mg/dL 0.91 0.93 0.84   EGFR mL/min/1.73m*2 59* 57* 65   CALCIUM mg/dL 8.9 8.8 9.0     Lab Results   Component Value Date    HGBA1C 5.0 10/12/2023    HGBA1C 5.5 04/16/2020         GI per flowsheet:  Gastrointestinal  Gastrointestinal (WDL): Within Defined Limits  Last bowel movement documented:    PMH:   Allergies: Pravastatin and Penicillins     Anthropometrics:  Height: 175.3 cm (5' 9.02\")  Weight: 51.3 kg (113 lb 1.5 oz)  BMI (Calculated): 16.69  IBW: 66 kg  %IBW: 78 %          Estimated Nutritional Needs:  Method for Estimating Needs: 5703-0750 "   MSJ    Method for Estimating Needs: 66-79   1-1.2 gm kg of IBW    Method for Estimating Needs: 2268-9129  as medically indicated   20-30 ml kg of IBW    Nutrition Focused Physical Findings:   Orbital Fat Pads: Severe (dark circles, hollowing and loose skin)  Buccal Fat Pads: Severe (hollow, sunken and narrow face)  Triceps: Severe (negligible fat tissue)  Ribs: Severe (protruding prominent clavicle)           Pain Score: 0 - No pain     Nutrition Diagnosis   Patient has Malnutrition Diagnosis: Yes  Diagnosis Status: New  Malnutrition Diagnosis: Severe malnutrition related to starvation  As Evidenced by: Severe muscle and fat loss noted on physical exam.  Pt is 78% of her IBW with a BMI of 16.7    Patient has Nutrition Diagnosis: No          Plan    Interventions        Individualized Nutrition Prescription Provided for : Continue bite sized texture diet per pt need,  continue ensure plus high protein X 2 to help meet nutritional needs.    Nutrition Monitoring and Evaluation   Biochemical Data, Medical Tests and Procedures  Monitoring and Evaluation Plan: Electrolyte/renal panel, Glucose/endocrine profile  Food/Nutrient Related History Monitoring  Monitoring and Evaluation Plan: Energy intake, Fluid intake, Amount of food    Progress towards goals: Met    Education Documentation  No documentation found.        Time Spent (min): 30 minutes  Last Date of Nutrition Visit: 11/30/23  Nutrition Follow-Up Needed?: 3-8 days  Follow up Comment: 12/6  MARIBEL

## 2023-12-01 NOTE — CARE PLAN
The patient's goals for the shift include  Patient will have decrease pain levels    The clinical goals for the shift include Patient will not c/o SOB during    Over the shift, the patient did not make progress toward the following goals. Barriers to progression include Patient at times with pain in her back. Recommendations to address these barriers include continue to monitor.

## 2023-12-02 LAB
ALBUMIN SERPL BCP-MCNC: 3 G/DL (ref 3.4–5)
ALP SERPL-CCNC: 52 U/L (ref 33–136)
ALT SERPL W P-5'-P-CCNC: 12 U/L (ref 7–45)
ANION GAP SERPL CALC-SCNC: 13 MMOL/L (ref 10–20)
AST SERPL W P-5'-P-CCNC: 15 U/L (ref 9–39)
BILIRUB SERPL-MCNC: 0.3 MG/DL (ref 0–1.2)
BUN SERPL-MCNC: 47 MG/DL (ref 6–23)
CALCIUM SERPL-MCNC: 8.6 MG/DL (ref 8.6–10.3)
CHLORIDE SERPL-SCNC: 99 MMOL/L (ref 98–107)
CO2 SERPL-SCNC: 26 MMOL/L (ref 21–32)
CREAT SERPL-MCNC: 1.05 MG/DL (ref 0.5–1.05)
ERYTHROCYTE [DISTWIDTH] IN BLOOD BY AUTOMATED COUNT: 13.7 % (ref 11.5–14.5)
GFR SERPL CREATININE-BSD FRML MDRD: 50 ML/MIN/1.73M*2
GLUCOSE SERPL-MCNC: 79 MG/DL (ref 74–99)
HCT VFR BLD AUTO: 28.9 % (ref 36–46)
HGB BLD-MCNC: 9 G/DL (ref 12–16)
MCH RBC QN AUTO: 30 PG (ref 26–34)
MCHC RBC AUTO-ENTMCNC: 31.1 G/DL (ref 32–36)
MCV RBC AUTO: 96 FL (ref 80–100)
NRBC BLD-RTO: 0 /100 WBCS (ref 0–0)
PLATELET # BLD AUTO: 248 X10*3/UL (ref 150–450)
POTASSIUM SERPL-SCNC: 5.1 MMOL/L (ref 3.5–5.3)
PROT SERPL-MCNC: 5.6 G/DL (ref 6.4–8.2)
RBC # BLD AUTO: 3 X10*6/UL (ref 4–5.2)
SODIUM SERPL-SCNC: 133 MMOL/L (ref 136–145)
VANCOMYCIN SERPL-MCNC: 5.5 UG/ML (ref 5–20)
WBC # BLD AUTO: 8 X10*3/UL (ref 4.4–11.3)

## 2023-12-02 PROCEDURE — 80202 ASSAY OF VANCOMYCIN: CPT | Performed by: INTERNAL MEDICINE

## 2023-12-02 PROCEDURE — 80053 COMPREHEN METABOLIC PANEL: CPT | Performed by: INTERNAL MEDICINE

## 2023-12-02 PROCEDURE — 2500000005 HC RX 250 GENERAL PHARMACY W/O HCPCS: Performed by: INTERNAL MEDICINE

## 2023-12-02 PROCEDURE — 1100000001 HC PRIVATE ROOM DAILY

## 2023-12-02 PROCEDURE — 36415 COLL VENOUS BLD VENIPUNCTURE: CPT | Performed by: INTERNAL MEDICINE

## 2023-12-02 PROCEDURE — 2500000004 HC RX 250 GENERAL PHARMACY W/ HCPCS (ALT 636 FOR OP/ED): Performed by: INTERNAL MEDICINE

## 2023-12-02 PROCEDURE — 2500000001 HC RX 250 WO HCPCS SELF ADMINISTERED DRUGS (ALT 637 FOR MEDICARE OP): Performed by: NURSE PRACTITIONER

## 2023-12-02 PROCEDURE — 2500000004 HC RX 250 GENERAL PHARMACY W/ HCPCS (ALT 636 FOR OP/ED): Performed by: NURSE PRACTITIONER

## 2023-12-02 PROCEDURE — 96372 THER/PROPH/DIAG INJ SC/IM: CPT | Performed by: NURSE PRACTITIONER

## 2023-12-02 PROCEDURE — 85027 COMPLETE CBC AUTOMATED: CPT | Performed by: INTERNAL MEDICINE

## 2023-12-02 RX ORDER — VANCOMYCIN HYDROCHLORIDE 1 G/20ML
INJECTION, POWDER, LYOPHILIZED, FOR SOLUTION INTRAVENOUS DAILY PRN
Status: DISCONTINUED | OUTPATIENT
Start: 2023-12-02 | End: 2023-12-04

## 2023-12-02 RX ORDER — PRIMIDONE 50 MG/1
250 TABLET ORAL 3 TIMES DAILY
Status: DISCONTINUED | OUTPATIENT
Start: 2023-12-02 | End: 2023-12-08 | Stop reason: HOSPADM

## 2023-12-02 RX ADMIN — DEXAMETHASONE 6 MG: 6 TABLET ORAL at 08:27

## 2023-12-02 RX ADMIN — ATORVASTATIN CALCIUM 20 MG: 20 TABLET, FILM COATED ORAL at 08:27

## 2023-12-02 RX ADMIN — ANASTROZOLE 1 MG: 1 TABLET, COATED ORAL at 08:32

## 2023-12-02 RX ADMIN — PRIMIDONE 75 MG: 50 TABLET ORAL at 21:07

## 2023-12-02 RX ADMIN — AMLODIPINE BESYLATE 10 MG: 10 TABLET ORAL at 08:27

## 2023-12-02 RX ADMIN — LIDOCAINE 1 PATCH: 4 PATCH TOPICAL at 08:27

## 2023-12-02 RX ADMIN — POLYETHYLENE GLYCOL 3350 17 G: 17 POWDER, FOR SOLUTION ORAL at 08:26

## 2023-12-02 RX ADMIN — CHOLECALCIFEROL TAB 25 MCG (1000 UNIT) 2000 UNITS: 25 TAB at 08:27

## 2023-12-02 RX ADMIN — MEROPENEM 1000 MG: 1 INJECTION, POWDER, FOR SOLUTION INTRAVENOUS at 22:24

## 2023-12-02 RX ADMIN — ENOXAPARIN SODIUM 30 MG: 30 INJECTION SUBCUTANEOUS at 14:45

## 2023-12-02 RX ADMIN — ASPIRIN 81 MG: 81 TABLET, COATED ORAL at 08:27

## 2023-12-02 RX ADMIN — CYANOCOBALAMIN TAB 1000 MCG 1000 MCG: 1000 TAB at 08:27

## 2023-12-02 RX ADMIN — Medication 1 TABLET: at 21:07

## 2023-12-02 RX ADMIN — Medication 1 TABLET: at 08:26

## 2023-12-02 RX ADMIN — LEVOTHYROXINE SODIUM 37.5 MCG: 0.07 TABLET ORAL at 14:46

## 2023-12-02 RX ADMIN — VANCOMYCIN HYDROCHLORIDE 750 MG: 10 INJECTION, POWDER, LYOPHILIZED, FOR SOLUTION INTRAVENOUS at 14:45

## 2023-12-02 RX ADMIN — PRIMIDONE 75 MG: 50 TABLET ORAL at 17:20

## 2023-12-02 RX ADMIN — MEROPENEM 1000 MG: 1 INJECTION, POWDER, FOR SOLUTION INTRAVENOUS at 10:09

## 2023-12-02 ASSESSMENT — COGNITIVE AND FUNCTIONAL STATUS - GENERAL
TURNING FROM BACK TO SIDE WHILE IN FLAT BAD: A LITTLE
MOBILITY SCORE: 14
TOILETING: A LOT
STANDING UP FROM CHAIR USING ARMS: A LOT
MOVING FROM LYING ON BACK TO SITTING ON SIDE OF FLAT BED WITH BEDRAILS: A LITTLE
PERSONAL GROOMING: A LOT
DAILY ACTIVITIY SCORE: 17
MOVING TO AND FROM BED TO CHAIR: A LITTLE
WALKING IN HOSPITAL ROOM: A LOT
DRESSING REGULAR LOWER BODY CLOTHING: A LITTLE
HELP NEEDED FOR BATHING: A LITTLE
DRESSING REGULAR UPPER BODY CLOTHING: A LITTLE
CLIMB 3 TO 5 STEPS WITH RAILING: TOTAL

## 2023-12-02 ASSESSMENT — PAIN - FUNCTIONAL ASSESSMENT: PAIN_FUNCTIONAL_ASSESSMENT: 0-10

## 2023-12-02 ASSESSMENT — PAIN SCALES - GENERAL
PAINLEVEL_OUTOF10: 0 - NO PAIN
PAINLEVEL_OUTOF10: 0 - NO PAIN

## 2023-12-02 NOTE — PROGRESS NOTES
Regi Leger is a 89 y.o. female on day 4 of admission presenting with UTI (urinary tract infection).      Subjective   Patient fully evaluated on November 27.  Improved but still very weak and probably will require skilled nursing.       Objective     Last Recorded Vitals  /67   Pulse 65   Temp 36.3 °C (97.3 °F)   Resp 18   Wt 51.3 kg (113 lb 1.5 oz)   SpO2 96%   Intake/Output last 3 Shifts:    Intake/Output Summary (Last 24 hours) at 12/2/2023 1449  Last data filed at 12/1/2023 1825  Gross per 24 hour   Intake --   Output 400 ml   Net -400 ml         Admission Weight  Weight: 51.3 kg (113 lb) (11/26/23 0916)    Daily Weight  11/30/23 : 51.3 kg (113 lb 1.5 oz)    Image Results  CT abdomen pelvis wo IV contrast  Narrative: Interpreted By:  Darryl Osborn,   STUDY:  CT ABDOMEN PELVIS WO IV CONTRAST;  11/30/2023 3:57 am      INDICATION:  Signs/Symptoms:sacral pain.      COMPARISON:  08/15/2022      ACCESSION NUMBER(S):  [id number]      ORDERING CLINICIAN:  DEBBIE FLORES      TECHNIQUE:  CT of the abdomen and pelvis was performed. Contiguous axial images  were obtained at 3 mm slice thickness through the abdomen and pelvis.  Coronal and sagittal reconstructions at 3 mm slice thickness were  performed.  Intravenous or oral contrast was not administered.      FINDINGS:  LOWER CHEST:  Small bilateral pleural effusions with compressive atelectasis.  Radiodensity at the right coronary artery would indicate  atherosclerotic calcification or stent.      ABDOMEN:      LIVER:  There is increased attenuation of the liver indicating hemosiderosis  or possibly amiodarone therapy. The dome of the liver was not  included, the patient was not recalled for additional imaging at this  time due to COVID precautions. If there is clinical concern of  hepatic pathology additional imaging possible could be obtained if  requested. The hepatic parenchyma otherwise is homogeneous. The  hepatic size is normal.      SPLEEN:  The  spleen is normal in size and homogeneous.      ADRENAL GLANDS:  Bilateral adrenal glands appear normal.      KIDNEYS AND URETERS:  There is moderate left renal cortical atrophy, measuring 7.8 cm in  length. There are several left renal cortical cysts. The right renal  cortex is maintained.      There are several nonobstructing intrarenal calculi, the largest  measuring 8 mm at the right interpole. The distal right ureteral  course is poorly visualized due to crowded overlying bowel loops.  Otherwise no identified urinary tract dilatation or radiodense  calculi.      PANCREAS:  Again as previously there are multiple hypodensities, the largest  measuring 2.1 cm at the neck of relative fluid attenuation indicating  cyst or cystic/mucinous neoplasm. No ductal dilatation.      GALLBLADDER:  No radiodense calculi, wall thickening or pericholecystic fluid.      BILE DUCTS:  There is no biliary dilatation or filling defects.          VESSELS:  Again there is an aortic-bi iliac endovascular stent extending  through a bimodal abdominal aortic aneurysm. The latter measures up  to 3.9 cm at the L3 level, with more saccular dilatation measuring  4.7 cm at the bifurcation, similar to the prior exam.      PERITONEUM AND RETROPERITONEUM:  Trace ascites is seen at the pelvis. No free intraperitoneal air.      The retroperitoneum appears unremarkable, and without significant  adenopathy.      No enlarged mesenteric lymph nodes.      BOWEL:  Fecal residue seen throughout the colon which is mildly dilated,  greatest at the rectosigmoid colon measuring 6.6 cm in diameter. This  is most likely due to constipation probably early impaction. There is  mild reticulation of the presacral space probably from dependent  edema. The stomach and small bowel segments appear unremarkable.      PELVIS:      BLADDER:  The urinary bladder contour is smooth.      REPRODUCTIVE ORGANS:  Status post hysterectomy.          BONE, ABDOMINAL WALL AND OTHER  FINDINGS:  Of note is sclerosis of the S5 vertebral body not evident on the  previous exam. This is nonspecific, but may be due to chronic  osteomyelitis as there is a left paramedian subcutaneous/soft tissue  fistula tract from approximately this level to the tip of the coccyx  is slightly more inferiorly to the inferior margin of the gluteal  fold best seen on image numbers 108 through 132 of series 201.  associated subcutaneous reticulation extends more superiorly to the  S3 level. However, as the fistula tract itself is not appear to  contact the S5 vertebral body, sclerosis due to developing stress  fracture is not excluded. A sclerotic metastatic lesion is considered  unlikely as there are no other bony sclerotic foci, but not  completely excluded. No evidence of additional osteomyelitis  including the coccyx. However if more definitive exclusion of such is  needed correlation with MRI and radionuclide imaging as warranted  would be recommended.      The abdominal wall soft tissues appear normal.      Impression: 1.  Sclerosis of the S5 vertebral body, suspicious for chronic  osteomyelitis as there is a locoregional superficial subcutaneous  fistula tract as described.  2. Increased attenuation of the hepatic parenchyma, probably due to  hemosiderosis, or can occur with amiodarone therapy. The dome of the  liver was not included.  3. Trace ascites.  4. Probable constipation and possibly impaction.  5. Redemonstration of pancreatic cyst or cystic/mucinous neoplasms.  6. Moderate left renal cortical atrophy with bilateral nonobstructing  intrarenal calculi.  7. Stable abdominal aortic aneurysms with endovascular stents.      MACRO:  1. None      Signed by: Darryl Osborn 11/30/2023 9:13 AM  Dictation workstation:   [id number]      Physical Exam    Relevant Results               Assessment/Plan                  Varghese Vasquez MD  Physician  Internal Medicine     H&P      Addendum     Date of Service: 11/26/2023   2:46 PM     Addendum       Expand All Collapse All    History Of Present Illness  Regi Leger is a 89 y.o. female with past medical history of abdominal aortic aneurysm s/p repair (01/2022), endoleak of aortic graft (no intervention pursued per patient's wishes), severe aortic stenosis, CAD s/p PCI/stent RCA, hypertension, hyperlipidemia, hypothyroidism, vocal cord paralysis following thyroidectomy,  CKD stage III, nephrolithiasis, urinary retention, PVD, anemia, osteoarthritis, DJD, breast cancer, and skin cancer who presented today with weakness.  HPI somewhat limited due to patient is hard of hearing.  She reports over the past few days she has been experiencing progressively worsening weakness.  She reports she became concerned that she may fall so she came to the emergency room for evaluation.  She admits to associated body aches that she has been taking over-the-counter medicine for.  She denies any fevers, chills, chest pain, shortness of breath, cough, abdominal pain, nausea, vomiting, diarrhea, or dysuria, changes in her chronic urinary incontinence.     In the ED lab work, EKG, and chest x-ray were performed. Labs revealed glucose 102, sodium 135 (chronically mildly low), bun 26 (chronic), , troponin 20, glucose 22, chronic anemia noted with red blood cell count 2.44, hemoglobin 10.5, and hematocrit 32.8, neutrophils 5.98, lymphocytes 0.74 UA revealed yellow hazy urine with moderate blood urobilinogen 2, large leuks.  Chest x-ray revealed no acute process.  EKG, per ER physician impression revealed normal sinus rhythm at a rate of 67 with nonspecific changes.  Vital signs were stable while in the ED.  She was given Rocephin and admitted to Dr. Vasquez.     10 point ROS negative except as noted above in HPI     Past medical history: As above  Past surgical history: Thyroidectomy, hysterectomy, PTCA/PCI with stent to RCA, AAA repair, cataract surgery  Social history: No history of smoking, alcohol  "abuse, or illicit drug use.  Lives with sister and nephew.  Ambulates with walker.  Family history: Father-stomach cancer; mother-myocardial infarction     Allergies  Pravastatin and Penicillins     Physical Exam  Constitutional:       Comments: Thin   HENT:      Head: Normocephalic and atraumatic.      Mouth/Throat:      Mouth: Mucous membranes are dry.   Eyes:      Conjunctiva/sclera: Conjunctivae normal.   Cardiovascular:      Rate and Rhythm: Normal rate and regular rhythm.      Heart sounds: Murmur heard.   Pulmonary:      Effort: Pulmonary effort is normal.      Breath sounds: No rales.   Abdominal:      General: Abdomen is flat. Bowel sounds are normal.      Palpations: Abdomen is soft.   Musculoskeletal:         General: Normal range of motion.      Cervical back: Neck supple.   Neurological:      Mental Status: She is alert. Mental status is at baseline.   Psychiatric:         Mood and Affect: Mood normal.            Last Recorded Vitals  Blood pressure (!) 155/97, pulse 77, temperature 36.5 °C (97.7 °F), temperature source Tympanic, resp. rate 25, height 1.727 m (5' 8\"), weight 51.3 kg (113 lb), SpO2 97 %.     Relevant Results     No current facility-administered medications on file prior to encounter.             Current Outpatient Medications on File Prior to Encounter   Medication Sig Dispense Refill    amLODIPine (Norvasc) 10 mg tablet Take 1 tablet (10 mg) by mouth once daily.        anastrozole (Arimidex) 1 mg tablet Take 1 tablet (1 mg total) by mouth once daily.        aspirin 81 mg EC tablet Take 1 tablet (81 mg) by mouth once daily.        cholecalciferol (Vitamin D-3) 50 MCG (2000 UT) tablet Take 1 tablet (2,000 Units) by mouth once daily.        cyanocobalamin (Vitamin B-12) 1,000 mcg tablet Take 1 tablet (1,000 mcg) by mouth once daily.        levothyroxine (Synthroid) 75 mcg tablet Take 1 tablet (75 mcg) by mouth once a day on Monday, Wednesday, and Friday.        atorvastatin (Lipitor) 20 mg " tablet Take 1 tablet (20 mg) by mouth once daily.        levothyroxine (Synthroid, Levoxyl) 75 mcg tablet Take 0.5 tablets (37.5 mcg) by mouth once a day on Sunday, Tuesday, Thursday, and Saturday.                Results for orders placed or performed during the hospital encounter of 11/26/23 (from the past 24 hour(s))   CBC and Auto Differential   Result Value Ref Range     WBC 7.6 4.4 - 11.3 x10*3/uL     nRBC 0.0 0.0 - 0.0 /100 WBCs     RBC 3.44 (L) 4.00 - 5.20 x10*6/uL     Hemoglobin 10.5 (L) 12.0 - 16.0 g/dL     Hematocrit 32.8 (L) 36.0 - 46.0 %     MCV 95 80 - 100 fL     MCH 30.5 26.0 - 34.0 pg     MCHC 32.0 32.0 - 36.0 g/dL     RDW 13.9 11.5 - 14.5 %     Platelets 277 150 - 450 x10*3/uL     Neutrophils % 78.5 40.0 - 80.0 %     Immature Granulocytes %, Automated 0.4 0.0 - 0.9 %     Lymphocytes % 9.7 13.0 - 44.0 %     Monocytes % 8.7 2.0 - 10.0 %     Eosinophils % 2.0 0.0 - 6.0 %     Basophils % 0.7 0.0 - 2.0 %     Neutrophils Absolute 5.98 (H) 1.60 - 5.50 x10*3/uL     Immature Granulocytes Absolute, Automated 0.03 0.00 - 0.50 x10*3/uL     Lymphocytes Absolute 0.74 (L) 0.80 - 3.00 x10*3/uL     Monocytes Absolute 0.66 0.05 - 0.80 x10*3/uL     Eosinophils Absolute 0.15 0.00 - 0.40 x10*3/uL     Basophils Absolute 0.05 0.00 - 0.10 x10*3/uL   Comprehensive metabolic panel   Result Value Ref Range     Glucose 102 (H) 74 - 99 mg/dL     Sodium 135 (L) 136 - 145 mmol/L     Potassium 4.5 3.5 - 5.3 mmol/L     Chloride 99 98 - 107 mmol/L     Bicarbonate 29 21 - 32 mmol/L     Anion Gap 12 10 - 20 mmol/L     Urea Nitrogen 26 (H) 6 - 23 mg/dL     Creatinine 0.91 0.50 - 1.05 mg/dL     eGFR 59 (L) >60 mL/min/1.73m*2     Calcium 9.3 8.6 - 10.3 mg/dL     Albumin 3.8 3.4 - 5.0 g/dL     Alkaline Phosphatase 73 33 - 136 U/L     Total Protein 7.3 6.4 - 8.2 g/dL     AST 20 9 - 39 U/L     Bilirubin, Total 0.4 0.0 - 1.2 mg/dL     ALT 11 7 - 45 U/L   Troponin I, High Sensitivity   Result Value Ref Range     Troponin I, High Sensitivity 20  (H) 0 - 13 ng/L   B-Type Natriuretic Peptide   Result Value Ref Range      (H) 0 - 99 pg/mL   Urinalysis with Reflex Microscopic and Culture   Result Value Ref Range     Color, Urine Yellow Straw, Yellow     Appearance, Urine Hazy (N) Clear     Specific Gravity, Urine 1.015 1.005 - 1.035     pH, Urine 5.0 5.0, 5.5, 6.0, 6.5, 7.0, 7.5, 8.0     Protein, Urine NEGATIVE NEGATIVE mg/dL     Glucose, Urine NEGATIVE NEGATIVE mg/dL     Blood, Urine MODERATE (2+) (A) NEGATIVE     Ketones, Urine NEGATIVE NEGATIVE mg/dL     Bilirubin, Urine NEGATIVE NEGATIVE     Urobilinogen, Urine 2.0 (N) <2.0 mg/dL     Nitrite, Urine NEGATIVE NEGATIVE     Leukocyte Esterase, Urine LARGE (3+) (A) NEGATIVE   Extra Urine Gray Tube   Result Value Ref Range     Extra Tube Hold for add-ons.     Microscopic Only, Urine   Result Value Ref Range     WBC, Urine 21-50 (A) 1-5, NONE /HPF     RBC, Urine 3-5 NONE, 1-2, 3-5 /HPF     Squamous Epithelial Cells, Urine 1-9 (SPARSE) Reference range not established. /HPF     Bacteria, Urine 1+ (A) NONE SEEN /HPF         XR chest 1 view     Result Date: 11/26/2023  Interpreted By:  Prosper Olivas, STUDY: XR CHEST 1 VIEW;  11/26/2023 10:54 am   INDICATION: Signs/Symptoms:Weakness.   COMPARISON: Portable chest, 7 September 2023   ACCESSION NUMBER(S): [id number]   ORDERING CLINICIAN: NICKY SPENCER   TECHNIQUE: Single frontal view of the chest; Portable technique   FINDINGS:   The cardiomediastinal silhouette is unchanged   No consolidative lung opacity   No edema / failure   No large pleural effusion or demonstrable pneumothorax        NO ACUTE DISEASE IN THE CHEST   MACRO: None   Signed by: Prosper Olivas 11/26/2023 11:18 AM Dictation workstation:   [id number]          Assessment/Plan   Principal Problem:    UTI (urinary tract infection)  Body Aches   Elevated troponin  Weakness     Admit to F  Observation  Continue rocephin  urine culture pending      CBC, BMP in am  PT/OT  Repeat troponin and EKG      Chronic conditions: Hypertension, hyperlipidemia, hypothyroidism, coronary artery disease, PVD, breast cancer     Continue home medications as listed above     DVT prophylaxis     SCDs  Lovenox           I spent 45 minutes in the professional and overall care of this patient.     Patient fully evaluated plan as above.  Harmony Estrada, APRN-CNP                 Revision History    Patient fully evaluated on November 27 and Decadron orally added for myalgias.  Await urine culture continue IV Rocephin.  Recheck labs in AM.  Lidoderm patch to sacrum for pain      Principal Problem:    UTI (urinary tract infection)  Active Problems:    Generalized weakness          Patient fully evaluated on November 28.  Still with significant sacral pain.  X-rays to be ordered.  Continue present medications transition to oral antibiotics and monitor.     Patient fully evaluated on November 29.  Awaiting final urine sensitivity.  Formerly and sacrum noted on plain x-ray and orthopedic consultation pain.  CAT scan ordered at that area.  Neurology consultation for tremors.    Patient fully evaluated on November 30.  Results of CAT scan are noted and patient started on IV antibiotics pending further infectious disease and orthopedic recommendations.  Recheck labs in AM.    Patient fully evaluated on December 1.  Continue IV antibiotics.  His await orthopedic input if necessary.  Case discussed with infectious disease.  Recheck labs in AM.    Patient fully evaluated on December 2.  Continue present IV antibiotics.  Primidone added for tremors.  Await final cultures.  Recheck labs in AM.  Varghese Vasquez MD

## 2023-12-02 NOTE — PROGRESS NOTES
"Vancomycin Dosing by Pharmacy- FOLLOW UP    Regi Leger is a 89 y.o. year old female who Pharmacy has been consulted for vancomycin dosing for other uti . Based on the patient's indication and renal status this patient is being dosed based on a goal trough/random level of 10-15.     Renal function is currently declining.    Current vancomycin dose: 750 mg given every 24 hours    Most recent random level: 5.5 mcg/mL    Visit Vitals  /61 (BP Location: Right arm, Patient Position: Lying)   Pulse 61   Temp 36.9 °C (98.4 °F) (Temporal)   Resp 18        Lab Results   Component Value Date    CREATININE 1.05 12/02/2023    CREATININE 1.15 (H) 12/01/2023    CREATININE 0.91 11/30/2023    CREATININE 0.93 11/29/2023        Patient weight is No results found for: \"PTWEIGHT\"    No results found for: \"CULTURE\"     I/O last 3 completed shifts:  In: 200 (3.9 mL/kg) [IV Piggyback:200]  Out: 2000 (39 mL/kg) [Urine:2000 (1.1 mL/kg/hr)]  Weight: 51.3 kg   [unfilled]    Lab Results   Component Value Date    PATIENTTEMP 37.0 09/16/2019        Assessment/Plan    Within goal random/trough level    This dosing regimen is predicted by [employer] to result in the following pharmacokinetic parameters: Is  NOT an [facility] patient. Should be  a dose by level vanco order.       The next level will be obtained on 12/3 at 1400. May be obtained sooner if clinically indicated.   Will continue to monitor renal function daily while on vancomycin and order serum creatinine at least every 48 hours if not already ordered.  Follow for continued vancomycin needs, clinical response, and signs/symptoms of toxicity.       Torin Sunshine, PharmD           "

## 2023-12-03 LAB
ALBUMIN SERPL BCP-MCNC: 3 G/DL (ref 3.4–5)
ALP SERPL-CCNC: 51 U/L (ref 33–136)
ALT SERPL W P-5'-P-CCNC: 14 U/L (ref 7–45)
ANION GAP SERPL CALC-SCNC: 9 MMOL/L (ref 10–20)
AST SERPL W P-5'-P-CCNC: 17 U/L (ref 9–39)
BILIRUB SERPL-MCNC: 0.3 MG/DL (ref 0–1.2)
BUN SERPL-MCNC: 48 MG/DL (ref 6–23)
CALCIUM SERPL-MCNC: 8.6 MG/DL (ref 8.6–10.3)
CHLORIDE SERPL-SCNC: 99 MMOL/L (ref 98–107)
CO2 SERPL-SCNC: 30 MMOL/L (ref 21–32)
CREAT SERPL-MCNC: 0.95 MG/DL (ref 0.5–1.05)
ERYTHROCYTE [DISTWIDTH] IN BLOOD BY AUTOMATED COUNT: 13.6 % (ref 11.5–14.5)
GFR SERPL CREATININE-BSD FRML MDRD: 56 ML/MIN/1.73M*2
GLUCOSE SERPL-MCNC: 83 MG/DL (ref 74–99)
HCT VFR BLD AUTO: 29.9 % (ref 36–46)
HGB BLD-MCNC: 9.4 G/DL (ref 12–16)
MCH RBC QN AUTO: 29.7 PG (ref 26–34)
MCHC RBC AUTO-ENTMCNC: 31.4 G/DL (ref 32–36)
MCV RBC AUTO: 95 FL (ref 80–100)
NRBC BLD-RTO: 0 /100 WBCS (ref 0–0)
PLATELET # BLD AUTO: 231 X10*3/UL (ref 150–450)
POTASSIUM SERPL-SCNC: 5.1 MMOL/L (ref 3.5–5.3)
PROT SERPL-MCNC: 5.5 G/DL (ref 6.4–8.2)
RBC # BLD AUTO: 3.16 X10*6/UL (ref 4–5.2)
SODIUM SERPL-SCNC: 133 MMOL/L (ref 136–145)
VANCOMYCIN TROUGH SERPL-MCNC: 6.9 UG/ML (ref 5–20)
WBC # BLD AUTO: 7.5 X10*3/UL (ref 4.4–11.3)

## 2023-12-03 PROCEDURE — 2500000001 HC RX 250 WO HCPCS SELF ADMINISTERED DRUGS (ALT 637 FOR MEDICARE OP): Performed by: NURSE PRACTITIONER

## 2023-12-03 PROCEDURE — 2500000004 HC RX 250 GENERAL PHARMACY W/ HCPCS (ALT 636 FOR OP/ED): Performed by: NURSE PRACTITIONER

## 2023-12-03 PROCEDURE — 80053 COMPREHEN METABOLIC PANEL: CPT | Performed by: INTERNAL MEDICINE

## 2023-12-03 PROCEDURE — 36415 COLL VENOUS BLD VENIPUNCTURE: CPT | Performed by: INTERNAL MEDICINE

## 2023-12-03 PROCEDURE — 2500000004 HC RX 250 GENERAL PHARMACY W/ HCPCS (ALT 636 FOR OP/ED): Performed by: INTERNAL MEDICINE

## 2023-12-03 PROCEDURE — 96372 THER/PROPH/DIAG INJ SC/IM: CPT | Performed by: NURSE PRACTITIONER

## 2023-12-03 PROCEDURE — 85027 COMPLETE CBC AUTOMATED: CPT | Performed by: INTERNAL MEDICINE

## 2023-12-03 PROCEDURE — 1100000001 HC PRIVATE ROOM DAILY

## 2023-12-03 PROCEDURE — 2500000005 HC RX 250 GENERAL PHARMACY W/O HCPCS: Performed by: INTERNAL MEDICINE

## 2023-12-03 PROCEDURE — 80202 ASSAY OF VANCOMYCIN: CPT | Performed by: INTERNAL MEDICINE

## 2023-12-03 RX ADMIN — CHOLECALCIFEROL TAB 25 MCG (1000 UNIT) 2000 UNITS: 25 TAB at 09:25

## 2023-12-03 RX ADMIN — PRIMIDONE 75 MG: 50 TABLET ORAL at 20:42

## 2023-12-03 RX ADMIN — Medication 1 TABLET: at 09:24

## 2023-12-03 RX ADMIN — PRIMIDONE 75 MG: 50 TABLET ORAL at 15:15

## 2023-12-03 RX ADMIN — Medication 1 TABLET: at 20:42

## 2023-12-03 RX ADMIN — POLYETHYLENE GLYCOL 3350 17 G: 17 POWDER, FOR SOLUTION ORAL at 09:40

## 2023-12-03 RX ADMIN — AMLODIPINE BESYLATE 10 MG: 10 TABLET ORAL at 09:23

## 2023-12-03 RX ADMIN — ANASTROZOLE 1 MG: 1 TABLET, COATED ORAL at 09:25

## 2023-12-03 RX ADMIN — ENOXAPARIN SODIUM 30 MG: 30 INJECTION SUBCUTANEOUS at 15:15

## 2023-12-03 RX ADMIN — MEROPENEM 1000 MG: 1 INJECTION, POWDER, FOR SOLUTION INTRAVENOUS at 21:43

## 2023-12-03 RX ADMIN — ASPIRIN 81 MG: 81 TABLET, COATED ORAL at 09:25

## 2023-12-03 RX ADMIN — DEXAMETHASONE 6 MG: 6 TABLET ORAL at 09:24

## 2023-12-03 RX ADMIN — MEROPENEM 1000 MG: 1 INJECTION, POWDER, FOR SOLUTION INTRAVENOUS at 09:30

## 2023-12-03 RX ADMIN — PRIMIDONE 75 MG: 50 TABLET ORAL at 09:24

## 2023-12-03 RX ADMIN — ATORVASTATIN CALCIUM 20 MG: 20 TABLET, FILM COATED ORAL at 09:24

## 2023-12-03 RX ADMIN — CYANOCOBALAMIN TAB 1000 MCG 1000 MCG: 1000 TAB at 09:25

## 2023-12-03 RX ADMIN — LEVOTHYROXINE SODIUM 37.5 MCG: 0.07 TABLET ORAL at 15:15

## 2023-12-03 RX ADMIN — LIDOCAINE 1 PATCH: 4 PATCH TOPICAL at 09:23

## 2023-12-03 RX ADMIN — VANCOMYCIN HYDROCHLORIDE 750 MG: 10 INJECTION, POWDER, LYOPHILIZED, FOR SOLUTION INTRAVENOUS at 15:15

## 2023-12-03 ASSESSMENT — COGNITIVE AND FUNCTIONAL STATUS - GENERAL
DRESSING REGULAR UPPER BODY CLOTHING: A LITTLE
TOILETING: A LITTLE
DRESSING REGULAR LOWER BODY CLOTHING: A LITTLE
MOVING TO AND FROM BED TO CHAIR: A LITTLE
CLIMB 3 TO 5 STEPS WITH RAILING: TOTAL
STANDING UP FROM CHAIR USING ARMS: A LOT
HELP NEEDED FOR BATHING: A LITTLE
MOVING FROM LYING ON BACK TO SITTING ON SIDE OF FLAT BED WITH BEDRAILS: A LITTLE
PERSONAL GROOMING: A LITTLE
DAILY ACTIVITIY SCORE: 19
TURNING FROM BACK TO SIDE WHILE IN FLAT BAD: A LITTLE
WALKING IN HOSPITAL ROOM: TOTAL
MOBILITY SCORE: 13

## 2023-12-03 ASSESSMENT — PAIN SCALES - GENERAL
PAINLEVEL_OUTOF10: 0 - NO PAIN

## 2023-12-03 ASSESSMENT — PAIN - FUNCTIONAL ASSESSMENT
PAIN_FUNCTIONAL_ASSESSMENT: 0-10

## 2023-12-03 NOTE — PROGRESS NOTES
"Vancomycin Dosing by Pharmacy- FOLLOW UP    Regi Leger is a 89 y.o. year old female who Pharmacy has been consulted for vancomycin dosing for UTI. Based on the patient's indication and renal status this patient is being dosed based on a goal trough/random level of 10-15.     Renal function is currently stable.    Current vancomycin dose: 750 mg given every 24 hours    Most recent random level: 6.9 mcg/mL    Visit Vitals  /58 (BP Location: Left arm, Patient Position: Lying)   Pulse 72   Temp 36.5 °C (97.7 °F) (Temporal)   Resp 18        Lab Results   Component Value Date    CREATININE 0.95 12/03/2023    CREATININE 1.05 12/02/2023    CREATININE 1.15 (H) 12/01/2023    CREATININE 0.91 11/30/2023        Patient weight is No results found for: \"PTWEIGHT\"    No results found for: \"CULTURE\"     I/O last 3 completed shifts:  In: 400 (7.8 mL/kg) [P.O.:300; IV Piggyback:100]  Out: 1100 (21.4 mL/kg) [Urine:1100 (0.6 mL/kg/hr)]  Weight: 51.3 kg   [unfilled]    Lab Results   Component Value Date    PATIENTTEMP 37.0 09/16/2019        Assessment/Plan    Below goal vanco level.  Since pt already received her 750 mg dose today, will check another level tomorrow, 12.4.23 at 1400.    Will continue to monitor renal function daily while on vancomycin and order serum creatinine at least every 48 hours if not already ordered.  Follow for continued vancomycin needs, clinical response, and signs/symptoms of toxicity.       Kaitlin Henson, PharmD           "

## 2023-12-03 NOTE — PROGRESS NOTES
Regi Leger is a 89 y.o. female on day 5 of admission presenting with UTI (urinary tract infection).      Subjective   Patient fully evaluated on November 27.  Improved but still very weak and probably will require skilled nursing.       Objective     Last Recorded Vitals  /58 (BP Location: Left arm, Patient Position: Lying)   Pulse 72   Temp 36.5 °C (97.7 °F) (Temporal)   Resp 18   Wt 51.3 kg (113 lb 1.5 oz)   SpO2 92%   Intake/Output last 3 Shifts:    Intake/Output Summary (Last 24 hours) at 12/3/2023 1459  Last data filed at 12/3/2023 0500  Gross per 24 hour   Intake 400 ml   Output 1100 ml   Net -700 ml         Admission Weight  Weight: 51.3 kg (113 lb) (11/26/23 0916)    Daily Weight  11/30/23 : 51.3 kg (113 lb 1.5 oz)    Image Results  CT abdomen pelvis wo IV contrast  Narrative: Interpreted By:  Darryl Osborn,   STUDY:  CT ABDOMEN PELVIS WO IV CONTRAST;  11/30/2023 3:57 am      INDICATION:  Signs/Symptoms:sacral pain.      COMPARISON:  08/15/2022      ACCESSION NUMBER(S):  [id number]      ORDERING CLINICIAN:  DEBBIE FLORES      TECHNIQUE:  CT of the abdomen and pelvis was performed. Contiguous axial images  were obtained at 3 mm slice thickness through the abdomen and pelvis.  Coronal and sagittal reconstructions at 3 mm slice thickness were  performed.  Intravenous or oral contrast was not administered.      FINDINGS:  LOWER CHEST:  Small bilateral pleural effusions with compressive atelectasis.  Radiodensity at the right coronary artery would indicate  atherosclerotic calcification or stent.      ABDOMEN:      LIVER:  There is increased attenuation of the liver indicating hemosiderosis  or possibly amiodarone therapy. The dome of the liver was not  included, the patient was not recalled for additional imaging at this  time due to COVID precautions. If there is clinical concern of  hepatic pathology additional imaging possible could be obtained if  requested. The hepatic parenchyma otherwise  is homogeneous. The  hepatic size is normal.      SPLEEN:  The spleen is normal in size and homogeneous.      ADRENAL GLANDS:  Bilateral adrenal glands appear normal.      KIDNEYS AND URETERS:  There is moderate left renal cortical atrophy, measuring 7.8 cm in  length. There are several left renal cortical cysts. The right renal  cortex is maintained.      There are several nonobstructing intrarenal calculi, the largest  measuring 8 mm at the right interpole. The distal right ureteral  course is poorly visualized due to crowded overlying bowel loops.  Otherwise no identified urinary tract dilatation or radiodense  calculi.      PANCREAS:  Again as previously there are multiple hypodensities, the largest  measuring 2.1 cm at the neck of relative fluid attenuation indicating  cyst or cystic/mucinous neoplasm. No ductal dilatation.      GALLBLADDER:  No radiodense calculi, wall thickening or pericholecystic fluid.      BILE DUCTS:  There is no biliary dilatation or filling defects.          VESSELS:  Again there is an aortic-bi iliac endovascular stent extending  through a bimodal abdominal aortic aneurysm. The latter measures up  to 3.9 cm at the L3 level, with more saccular dilatation measuring  4.7 cm at the bifurcation, similar to the prior exam.      PERITONEUM AND RETROPERITONEUM:  Trace ascites is seen at the pelvis. No free intraperitoneal air.      The retroperitoneum appears unremarkable, and without significant  adenopathy.      No enlarged mesenteric lymph nodes.      BOWEL:  Fecal residue seen throughout the colon which is mildly dilated,  greatest at the rectosigmoid colon measuring 6.6 cm in diameter. This  is most likely due to constipation probably early impaction. There is  mild reticulation of the presacral space probably from dependent  edema. The stomach and small bowel segments appear unremarkable.      PELVIS:      BLADDER:  The urinary bladder contour is smooth.      REPRODUCTIVE ORGANS:  Status  post hysterectomy.          BONE, ABDOMINAL WALL AND OTHER FINDINGS:  Of note is sclerosis of the S5 vertebral body not evident on the  previous exam. This is nonspecific, but may be due to chronic  osteomyelitis as there is a left paramedian subcutaneous/soft tissue  fistula tract from approximately this level to the tip of the coccyx  is slightly more inferiorly to the inferior margin of the gluteal  fold best seen on image numbers 108 through 132 of series 201.  associated subcutaneous reticulation extends more superiorly to the  S3 level. However, as the fistula tract itself is not appear to  contact the S5 vertebral body, sclerosis due to developing stress  fracture is not excluded. A sclerotic metastatic lesion is considered  unlikely as there are no other bony sclerotic foci, but not  completely excluded. No evidence of additional osteomyelitis  including the coccyx. However if more definitive exclusion of such is  needed correlation with MRI and radionuclide imaging as warranted  would be recommended.      The abdominal wall soft tissues appear normal.      Impression: 1.  Sclerosis of the S5 vertebral body, suspicious for chronic  osteomyelitis as there is a locoregional superficial subcutaneous  fistula tract as described.  2. Increased attenuation of the hepatic parenchyma, probably due to  hemosiderosis, or can occur with amiodarone therapy. The dome of the  liver was not included.  3. Trace ascites.  4. Probable constipation and possibly impaction.  5. Redemonstration of pancreatic cyst or cystic/mucinous neoplasms.  6. Moderate left renal cortical atrophy with bilateral nonobstructing  intrarenal calculi.  7. Stable abdominal aortic aneurysms with endovascular stents.      MACRO:  1. None      Signed by: Darryl Osborn 11/30/2023 9:13 AM  Dictation workstation:   [id number]      Physical Exam    Relevant Results               Assessment/Plan                  Varghese Vasquez MD  Physician  Internal  Medicine     H&P      Addendum     Date of Service: 11/26/2023  2:46 PM     Addendum       Expand All Collapse All    History Of Present Illness  Regi Leger is a 89 y.o. female with past medical history of abdominal aortic aneurysm s/p repair (01/2022), endoleak of aortic graft (no intervention pursued per patient's wishes), severe aortic stenosis, CAD s/p PCI/stent RCA, hypertension, hyperlipidemia, hypothyroidism, vocal cord paralysis following thyroidectomy,  CKD stage III, nephrolithiasis, urinary retention, PVD, anemia, osteoarthritis, DJD, breast cancer, and skin cancer who presented today with weakness.  HPI somewhat limited due to patient is hard of hearing.  She reports over the past few days she has been experiencing progressively worsening weakness.  She reports she became concerned that she may fall so she came to the emergency room for evaluation.  She admits to associated body aches that she has been taking over-the-counter medicine for.  She denies any fevers, chills, chest pain, shortness of breath, cough, abdominal pain, nausea, vomiting, diarrhea, or dysuria, changes in her chronic urinary incontinence.     In the ED lab work, EKG, and chest x-ray were performed. Labs revealed glucose 102, sodium 135 (chronically mildly low), bun 26 (chronic), , troponin 20, glucose 22, chronic anemia noted with red blood cell count 2.44, hemoglobin 10.5, and hematocrit 32.8, neutrophils 5.98, lymphocytes 0.74 UA revealed yellow hazy urine with moderate blood urobilinogen 2, large leuks.  Chest x-ray revealed no acute process.  EKG, per ER physician impression revealed normal sinus rhythm at a rate of 67 with nonspecific changes.  Vital signs were stable while in the ED.  She was given Rocephin and admitted to Dr. Vasquez.     10 point ROS negative except as noted above in HPI     Past medical history: As above  Past surgical history: Thyroidectomy, hysterectomy, PTCA/PCI with stent to RCA, AAA repair,  "cataract surgery  Social history: No history of smoking, alcohol abuse, or illicit drug use.  Lives with sister and nephew.  Ambulates with walker.  Family history: Father-stomach cancer; mother-myocardial infarction     Allergies  Pravastatin and Penicillins     Physical Exam  Constitutional:       Comments: Thin   HENT:      Head: Normocephalic and atraumatic.      Mouth/Throat:      Mouth: Mucous membranes are dry.   Eyes:      Conjunctiva/sclera: Conjunctivae normal.   Cardiovascular:      Rate and Rhythm: Normal rate and regular rhythm.      Heart sounds: Murmur heard.   Pulmonary:      Effort: Pulmonary effort is normal.      Breath sounds: No rales.   Abdominal:      General: Abdomen is flat. Bowel sounds are normal.      Palpations: Abdomen is soft.   Musculoskeletal:         General: Normal range of motion.      Cervical back: Neck supple.   Neurological:      Mental Status: She is alert. Mental status is at baseline.   Psychiatric:         Mood and Affect: Mood normal.            Last Recorded Vitals  Blood pressure (!) 155/97, pulse 77, temperature 36.5 °C (97.7 °F), temperature source Tympanic, resp. rate 25, height 1.727 m (5' 8\"), weight 51.3 kg (113 lb), SpO2 97 %.     Relevant Results     No current facility-administered medications on file prior to encounter.             Current Outpatient Medications on File Prior to Encounter   Medication Sig Dispense Refill    amLODIPine (Norvasc) 10 mg tablet Take 1 tablet (10 mg) by mouth once daily.        anastrozole (Arimidex) 1 mg tablet Take 1 tablet (1 mg total) by mouth once daily.        aspirin 81 mg EC tablet Take 1 tablet (81 mg) by mouth once daily.        cholecalciferol (Vitamin D-3) 50 MCG (2000 UT) tablet Take 1 tablet (2,000 Units) by mouth once daily.        cyanocobalamin (Vitamin B-12) 1,000 mcg tablet Take 1 tablet (1,000 mcg) by mouth once daily.        levothyroxine (Synthroid) 75 mcg tablet Take 1 tablet (75 mcg) by mouth once a day on " Monday, Wednesday, and Friday.        atorvastatin (Lipitor) 20 mg tablet Take 1 tablet (20 mg) by mouth once daily.        levothyroxine (Synthroid, Levoxyl) 75 mcg tablet Take 0.5 tablets (37.5 mcg) by mouth once a day on Sunday, Tuesday, Thursday, and Saturday.                Results for orders placed or performed during the hospital encounter of 11/26/23 (from the past 24 hour(s))   CBC and Auto Differential   Result Value Ref Range     WBC 7.6 4.4 - 11.3 x10*3/uL     nRBC 0.0 0.0 - 0.0 /100 WBCs     RBC 3.44 (L) 4.00 - 5.20 x10*6/uL     Hemoglobin 10.5 (L) 12.0 - 16.0 g/dL     Hematocrit 32.8 (L) 36.0 - 46.0 %     MCV 95 80 - 100 fL     MCH 30.5 26.0 - 34.0 pg     MCHC 32.0 32.0 - 36.0 g/dL     RDW 13.9 11.5 - 14.5 %     Platelets 277 150 - 450 x10*3/uL     Neutrophils % 78.5 40.0 - 80.0 %     Immature Granulocytes %, Automated 0.4 0.0 - 0.9 %     Lymphocytes % 9.7 13.0 - 44.0 %     Monocytes % 8.7 2.0 - 10.0 %     Eosinophils % 2.0 0.0 - 6.0 %     Basophils % 0.7 0.0 - 2.0 %     Neutrophils Absolute 5.98 (H) 1.60 - 5.50 x10*3/uL     Immature Granulocytes Absolute, Automated 0.03 0.00 - 0.50 x10*3/uL     Lymphocytes Absolute 0.74 (L) 0.80 - 3.00 x10*3/uL     Monocytes Absolute 0.66 0.05 - 0.80 x10*3/uL     Eosinophils Absolute 0.15 0.00 - 0.40 x10*3/uL     Basophils Absolute 0.05 0.00 - 0.10 x10*3/uL   Comprehensive metabolic panel   Result Value Ref Range     Glucose 102 (H) 74 - 99 mg/dL     Sodium 135 (L) 136 - 145 mmol/L     Potassium 4.5 3.5 - 5.3 mmol/L     Chloride 99 98 - 107 mmol/L     Bicarbonate 29 21 - 32 mmol/L     Anion Gap 12 10 - 20 mmol/L     Urea Nitrogen 26 (H) 6 - 23 mg/dL     Creatinine 0.91 0.50 - 1.05 mg/dL     eGFR 59 (L) >60 mL/min/1.73m*2     Calcium 9.3 8.6 - 10.3 mg/dL     Albumin 3.8 3.4 - 5.0 g/dL     Alkaline Phosphatase 73 33 - 136 U/L     Total Protein 7.3 6.4 - 8.2 g/dL     AST 20 9 - 39 U/L     Bilirubin, Total 0.4 0.0 - 1.2 mg/dL     ALT 11 7 - 45 U/L   Troponin I, High  Sensitivity   Result Value Ref Range     Troponin I, High Sensitivity 20 (H) 0 - 13 ng/L   B-Type Natriuretic Peptide   Result Value Ref Range      (H) 0 - 99 pg/mL   Urinalysis with Reflex Microscopic and Culture   Result Value Ref Range     Color, Urine Yellow Straw, Yellow     Appearance, Urine Hazy (N) Clear     Specific Gravity, Urine 1.015 1.005 - 1.035     pH, Urine 5.0 5.0, 5.5, 6.0, 6.5, 7.0, 7.5, 8.0     Protein, Urine NEGATIVE NEGATIVE mg/dL     Glucose, Urine NEGATIVE NEGATIVE mg/dL     Blood, Urine MODERATE (2+) (A) NEGATIVE     Ketones, Urine NEGATIVE NEGATIVE mg/dL     Bilirubin, Urine NEGATIVE NEGATIVE     Urobilinogen, Urine 2.0 (N) <2.0 mg/dL     Nitrite, Urine NEGATIVE NEGATIVE     Leukocyte Esterase, Urine LARGE (3+) (A) NEGATIVE   Extra Urine Gray Tube   Result Value Ref Range     Extra Tube Hold for add-ons.     Microscopic Only, Urine   Result Value Ref Range     WBC, Urine 21-50 (A) 1-5, NONE /HPF     RBC, Urine 3-5 NONE, 1-2, 3-5 /HPF     Squamous Epithelial Cells, Urine 1-9 (SPARSE) Reference range not established. /HPF     Bacteria, Urine 1+ (A) NONE SEEN /HPF         XR chest 1 view     Result Date: 11/26/2023  Interpreted By:  Prosper Olivas, STUDY: XR CHEST 1 VIEW;  11/26/2023 10:54 am   INDICATION: Signs/Symptoms:Weakness.   COMPARISON: Portable chest, 7 September 2023   ACCESSION NUMBER(S): [id number]   ORDERING CLINICIAN: NICKY SPENCER   TECHNIQUE: Single frontal view of the chest; Portable technique   FINDINGS:   The cardiomediastinal silhouette is unchanged   No consolidative lung opacity   No edema / failure   No large pleural effusion or demonstrable pneumothorax        NO ACUTE DISEASE IN THE CHEST   MACRO: None   Signed by: Prosper Olivas 11/26/2023 11:18 AM Dictation workstation:   [id number]          Assessment/Plan   Principal Problem:    UTI (urinary tract infection)  Body Aches   Elevated troponin  Weakness     Admit to [facility]  Observation  Continue rocephin  urine  culture pending      CBC, BMP in am  PT/OT  Repeat troponin and EKG     Chronic conditions: Hypertension, hyperlipidemia, hypothyroidism, coronary artery disease, PVD, breast cancer     Continue home medications as listed above     DVT prophylaxis     SCDs  Lovenox           I spent 45 minutes in the professional and overall care of this patient.     Patient fully evaluated plan as above.  Harmony Estrada, APRN-CNP                 Revision History    Patient fully evaluated on November 27 and Decadron orally added for myalgias.  Await urine culture continue IV Rocephin.  Recheck labs in AM.  Lidoderm patch to sacrum for pain      Principal Problem:    UTI (urinary tract infection)  Active Problems:    Generalized weakness          Patient fully evaluated on November 28.  Still with significant sacral pain.  X-rays to be ordered.  Continue present medications transition to oral antibiotics and monitor.     Patient fully evaluated on November 29.  Awaiting final urine sensitivity.  Formerly and sacrum noted on plain x-ray and orthopedic consultation pain.  CAT scan ordered at that area.  Neurology consultation for tremors.    Patient fully evaluated on November 30.  Results of CAT scan are noted and patient started on IV antibiotics pending further infectious disease and orthopedic recommendations.  Recheck labs in AM.    Patient fully evaluated on December 1.  Continue IV antibiotics.  His await orthopedic input if necessary.  Case discussed with infectious disease.  Recheck labs in AM.    Patient fully evaluated on December 2.  Continue present IV antibiotics.  Primidone added for tremors.  Await final cultures.  Recheck labs in AM.    Patient fully evaluated on December 3.  Tremors have improved with primidone.  Await final recommendations from infectious disease and ST antibiotics on discharge.  Patient planning on discharge home with home care.  Varghese Vasquez MD

## 2023-12-03 NOTE — CARE PLAN
The patient's goals for the shift include  Patient will not get out of bed alone    The clinical goals for the shift include Patient will work with PT/OT today    Over the shift, the patient did not make progress toward the following goals. Barriers to progression include patient still very weak. Recommendations to address these barriers include bed alarm and continue to monitor.

## 2023-12-04 ENCOUNTER — HOSPITAL ENCOUNTER (OUTPATIENT)
Dept: CARDIOLOGY | Facility: HOSPITAL | Age: 88
Discharge: HOME | End: 2023-12-04
Payer: COMMERCIAL

## 2023-12-04 LAB
ALBUMIN SERPL BCP-MCNC: 2.9 G/DL (ref 3.4–5)
ALP SERPL-CCNC: 51 U/L (ref 33–136)
ALT SERPL W P-5'-P-CCNC: 12 U/L (ref 7–45)
ANION GAP SERPL CALC-SCNC: 9 MMOL/L (ref 10–20)
AST SERPL W P-5'-P-CCNC: 15 U/L (ref 9–39)
BILIRUB SERPL-MCNC: 0.3 MG/DL (ref 0–1.2)
BUN SERPL-MCNC: 52 MG/DL (ref 6–23)
CALCIUM SERPL-MCNC: 8.3 MG/DL (ref 8.6–10.3)
CARDIAC TROPONIN I PNL SERPL HS: 25 NG/L (ref 0–13)
CHLORIDE SERPL-SCNC: 99 MMOL/L (ref 98–107)
CO2 SERPL-SCNC: 29 MMOL/L (ref 21–32)
CREAT SERPL-MCNC: 1.04 MG/DL (ref 0.5–1.05)
ERYTHROCYTE [DISTWIDTH] IN BLOOD BY AUTOMATED COUNT: 13.6 % (ref 11.5–14.5)
GFR SERPL CREATININE-BSD FRML MDRD: 50 ML/MIN/1.73M*2
GLUCOSE SERPL-MCNC: 93 MG/DL (ref 74–99)
HCT VFR BLD AUTO: 29.7 % (ref 36–46)
HGB BLD-MCNC: 9.3 G/DL (ref 12–16)
MAGNESIUM SERPL-MCNC: 1.92 MG/DL (ref 1.6–2.4)
MCH RBC QN AUTO: 29.7 PG (ref 26–34)
MCHC RBC AUTO-ENTMCNC: 31.3 G/DL (ref 32–36)
MCV RBC AUTO: 95 FL (ref 80–100)
NRBC BLD-RTO: 0 /100 WBCS (ref 0–0)
PLATELET # BLD AUTO: 262 X10*3/UL (ref 150–450)
POTASSIUM SERPL-SCNC: 4.7 MMOL/L (ref 3.5–5.3)
PROT SERPL-MCNC: 5.4 G/DL (ref 6.4–8.2)
RBC # BLD AUTO: 3.13 X10*6/UL (ref 4–5.2)
SODIUM SERPL-SCNC: 132 MMOL/L (ref 136–145)
WBC # BLD AUTO: 9.7 X10*3/UL (ref 4.4–11.3)

## 2023-12-04 PROCEDURE — 1100000001 HC PRIVATE ROOM DAILY

## 2023-12-04 PROCEDURE — 93005 ELECTROCARDIOGRAM TRACING: CPT

## 2023-12-04 PROCEDURE — 80053 COMPREHEN METABOLIC PANEL: CPT | Performed by: INTERNAL MEDICINE

## 2023-12-04 PROCEDURE — 83735 ASSAY OF MAGNESIUM: CPT | Performed by: INTERNAL MEDICINE

## 2023-12-04 PROCEDURE — 2500000001 HC RX 250 WO HCPCS SELF ADMINISTERED DRUGS (ALT 637 FOR MEDICARE OP): Performed by: NURSE PRACTITIONER

## 2023-12-04 PROCEDURE — 2500000004 HC RX 250 GENERAL PHARMACY W/ HCPCS (ALT 636 FOR OP/ED): Performed by: INTERNAL MEDICINE

## 2023-12-04 PROCEDURE — 96372 THER/PROPH/DIAG INJ SC/IM: CPT | Performed by: NURSE PRACTITIONER

## 2023-12-04 PROCEDURE — 2500000004 HC RX 250 GENERAL PHARMACY W/ HCPCS (ALT 636 FOR OP/ED): Performed by: NURSE PRACTITIONER

## 2023-12-04 PROCEDURE — 2500000005 HC RX 250 GENERAL PHARMACY W/O HCPCS: Performed by: INTERNAL MEDICINE

## 2023-12-04 PROCEDURE — 93010 ELECTROCARDIOGRAM REPORT: CPT | Performed by: INTERNAL MEDICINE

## 2023-12-04 PROCEDURE — 36415 COLL VENOUS BLD VENIPUNCTURE: CPT | Performed by: INTERNAL MEDICINE

## 2023-12-04 PROCEDURE — 84484 ASSAY OF TROPONIN QUANT: CPT | Performed by: INTERNAL MEDICINE

## 2023-12-04 PROCEDURE — 85027 COMPLETE CBC AUTOMATED: CPT | Performed by: INTERNAL MEDICINE

## 2023-12-04 RX ORDER — VANCOMYCIN HYDROCHLORIDE 1 G/200ML
1000 INJECTION, SOLUTION INTRAVENOUS EVERY 24 HOURS
Status: DISCONTINUED | OUTPATIENT
Start: 2023-12-04 | End: 2023-12-06

## 2023-12-04 RX ADMIN — LIDOCAINE 1 PATCH: 4 PATCH TOPICAL at 08:51

## 2023-12-04 RX ADMIN — CHOLECALCIFEROL TAB 25 MCG (1000 UNIT) 2000 UNITS: 25 TAB at 08:52

## 2023-12-04 RX ADMIN — PRIMIDONE 75 MG: 50 TABLET ORAL at 08:52

## 2023-12-04 RX ADMIN — POLYETHYLENE GLYCOL 3350 17 G: 17 POWDER, FOR SOLUTION ORAL at 08:52

## 2023-12-04 RX ADMIN — PRIMIDONE 75 MG: 50 TABLET ORAL at 16:27

## 2023-12-04 RX ADMIN — CYANOCOBALAMIN TAB 1000 MCG 1000 MCG: 1000 TAB at 08:52

## 2023-12-04 RX ADMIN — ATORVASTATIN CALCIUM 20 MG: 20 TABLET, FILM COATED ORAL at 08:52

## 2023-12-04 RX ADMIN — Medication 1 TABLET: at 20:37

## 2023-12-04 RX ADMIN — MEROPENEM 1000 MG: 1 INJECTION, POWDER, FOR SOLUTION INTRAVENOUS at 20:36

## 2023-12-04 RX ADMIN — LEVOTHYROXINE SODIUM 75 MCG: 0.07 TABLET ORAL at 14:21

## 2023-12-04 RX ADMIN — MEROPENEM 1000 MG: 1 INJECTION, POWDER, FOR SOLUTION INTRAVENOUS at 10:44

## 2023-12-04 RX ADMIN — Medication 1 TABLET: at 08:52

## 2023-12-04 RX ADMIN — ENOXAPARIN SODIUM 30 MG: 30 INJECTION SUBCUTANEOUS at 14:21

## 2023-12-04 RX ADMIN — VANCOMYCIN HYDROCHLORIDE 1000 MG: 1 INJECTION, SOLUTION INTRAVENOUS at 14:21

## 2023-12-04 RX ADMIN — ACETAMINOPHEN 650 MG: 160 SOLUTION ORAL at 06:00

## 2023-12-04 RX ADMIN — ANASTROZOLE 1 MG: 1 TABLET, COATED ORAL at 08:52

## 2023-12-04 RX ADMIN — DEXAMETHASONE 6 MG: 6 TABLET ORAL at 08:52

## 2023-12-04 RX ADMIN — ASPIRIN 81 MG: 81 TABLET, COATED ORAL at 08:52

## 2023-12-04 RX ADMIN — PRIMIDONE 75 MG: 50 TABLET ORAL at 20:37

## 2023-12-04 RX ADMIN — AMLODIPINE BESYLATE 10 MG: 10 TABLET ORAL at 08:52

## 2023-12-04 ASSESSMENT — PAIN SCALES - GENERAL
PAINLEVEL_OUTOF10: 0 - NO PAIN
PAINLEVEL_OUTOF10: 0 - NO PAIN
PAINLEVEL_OUTOF10: 5 - MODERATE PAIN
PAINLEVEL_OUTOF10: 5 - MODERATE PAIN
PAINLEVEL_OUTOF10: 0 - NO PAIN
PAINLEVEL_OUTOF10: 0 - NO PAIN

## 2023-12-04 ASSESSMENT — PAIN - FUNCTIONAL ASSESSMENT
PAIN_FUNCTIONAL_ASSESSMENT: 0-10

## 2023-12-04 NOTE — PROGRESS NOTES
"Infectious Disease Progress Note    Patient Name: Regi Leger   YOB: 1930    Subjective:      Objective:    /72 (BP Location: Left arm, Patient Position: Lying)   Pulse 64   Temp 36.7 °C (98.1 °F) (Temporal)   Resp 16   Ht 1.753 m (5' 9.02\")   Wt 51.3 kg (113 lb 1.5 oz)   LMP  (LMP Unknown)   SpO2 97%   BMI 16.69 kg/m²       Susceptibility data from last 90 days.  Collected Specimen Info Organism Nitrofurantoin Oxacillin Trimethoprim/Sulfamethoxazole Vancomycin   11/26/23 Urine from Straight Catheter Staphylococcus simulans S S S S       CT abdomen pelvis wo IV contrast    Result Date: 11/30/2023  Interpreted By:  Darryl Osborn, STUDY: CT ABDOMEN PELVIS WO IV CONTRAST;  11/30/2023 3:57 am   INDICATION: Signs/Symptoms:sacral pain.   COMPARISON: 08/15/2022   ACCESSION NUMBER(S): [id number]   ORDERING CLINICIAN: DEBBIE FLORES   TECHNIQUE: CT of the abdomen and pelvis was performed. Contiguous axial images were obtained at 3 mm slice thickness through the abdomen and pelvis. Coronal and sagittal reconstructions at 3 mm slice thickness were performed.  Intravenous or oral contrast was not administered.   FINDINGS: LOWER CHEST: Small bilateral pleural effusions with compressive atelectasis. Radiodensity at the right coronary artery would indicate atherosclerotic calcification or stent.   ABDOMEN:   LIVER: There is increased attenuation of the liver indicating hemosiderosis or possibly amiodarone therapy. The dome of the liver was not included, the patient was not recalled for additional imaging at this time due to COVID precautions. If there is clinical concern of hepatic pathology additional imaging possible could be obtained if requested. The hepatic parenchyma otherwise is homogeneous. The hepatic size is normal.   SPLEEN: The spleen is normal in size and homogeneous.   ADRENAL GLANDS: Bilateral adrenal glands appear normal.   KIDNEYS AND URETERS: There is moderate left renal cortical " atrophy, measuring 7.8 cm in length. There are several left renal cortical cysts. The right renal cortex is maintained.   There are several nonobstructing intrarenal calculi, the largest measuring 8 mm at the right interpole. The distal right ureteral course is poorly visualized due to crowded overlying bowel loops. Otherwise no identified urinary tract dilatation or radiodense calculi.   PANCREAS: Again as previously there are multiple hypodensities, the largest measuring 2.1 cm at the neck of relative fluid attenuation indicating cyst or cystic/mucinous neoplasm. No ductal dilatation.   GALLBLADDER: No radiodense calculi, wall thickening or pericholecystic fluid.   BILE DUCTS: There is no biliary dilatation or filling defects.     VESSELS: Again there is an aortic-bi iliac endovascular stent extending through a bimodal abdominal aortic aneurysm. The latter measures up to 3.9 cm at the L3 level, with more saccular dilatation measuring 4.7 cm at the bifurcation, similar to the prior exam.   PERITONEUM AND RETROPERITONEUM: Trace ascites is seen at the pelvis. No free intraperitoneal air.   The retroperitoneum appears unremarkable, and without significant adenopathy.   No enlarged mesenteric lymph nodes.   BOWEL: Fecal residue seen throughout the colon which is mildly dilated, greatest at the rectosigmoid colon measuring 6.6 cm in diameter. This is most likely due to constipation probably early impaction. There is mild reticulation of the presacral space probably from dependent edema. The stomach and small bowel segments appear unremarkable.   PELVIS:   BLADDER: The urinary bladder contour is smooth.   REPRODUCTIVE ORGANS: Status post hysterectomy.     BONE, ABDOMINAL WALL AND OTHER FINDINGS: Of note is sclerosis of the S5 vertebral body not evident on the previous exam. This is nonspecific, but may be due to chronic osteomyelitis as there is a left paramedian subcutaneous/soft tissue fistula tract from approximately  this level to the tip of the coccyx is slightly more inferiorly to the inferior margin of the gluteal fold best seen on image numbers 108 through 132 of series 201. associated subcutaneous reticulation extends more superiorly to the S3 level. However, as the fistula tract itself is not appear to contact the S5 vertebral body, sclerosis due to developing stress fracture is not excluded. A sclerotic metastatic lesion is considered unlikely as there are no other bony sclerotic foci, but not completely excluded. No evidence of additional osteomyelitis including the coccyx. However if more definitive exclusion of such is needed correlation with MRI and radionuclide imaging as warranted would be recommended.   The abdominal wall soft tissues appear normal.       1.  Sclerosis of the S5 vertebral body, suspicious for chronic osteomyelitis as there is a locoregional superficial subcutaneous fistula tract as described. 2. Increased attenuation of the hepatic parenchyma, probably due to hemosiderosis, or can occur with amiodarone therapy. The dome of the liver was not included. 3. Trace ascites. 4. Probable constipation and possibly impaction. 5. Redemonstration of pancreatic cyst or cystic/mucinous neoplasms. 6. Moderate left renal cortical atrophy with bilateral nonobstructing intrarenal calculi. 7. Stable abdominal aortic aneurysms with endovascular stents.   MACRO: 1. None   Signed by: Darryl Osborn 11/30/2023 9:13 AM Dictation workstation:   [id number]    XR sacrum coccyx 2+ views    Result Date: 11/28/2023  Interpreted By:  Darnell Contreras, STUDY: XR SACRUM COCCYX 2+ VIEWS; ;  11/28/2023 12:55 pm   INDICATION: Signs/Symptoms:PAIN.   COMPARISON: X-ray lumbar spine 06/24/2018   ACCESSION NUMBER(S): [id number]   ORDERING CLINICIAN: DEBBIE FLORES   FINDINGS: Three view sacrum/coccyx   On the lateral projection, there is deformity/irregularity and discontinuity of anterior and posterior cortical margins at the lower sacrum  with apparent associated cortical lucency particularly posteriorly new since prior. There is some regional soft tissue swelling. Degenerative changes visualized lower lumbar spine. There is also irregularity at the left superior pubic ramus on 1 projection. Mild degenerative changes of the visualized hip joints mild patchy sclerotic appearance at the femoral heads bilaterally left greater than right. There is also focal sclerotic appearance at the mid to lower sacrum centrally on the AP projection. Evaluation of sacrum and pelvic bones elsewhere otherwise limited by overlying bowel gas and fecal material. Partially imaged stent grafts in the lower aorta and iliac arteries.   Multiple calcific densities overlie the pelvis probably phleboliths and/or vascular calcifications. Curvilinear calcification partially imaged overlying the lower abdomen about the stent material on the left probably large peripherally calcified inferior abdominal aortic aneurysmal sac which was present previously but otherwise limited evaluation on this examination.       Deformity/irregularity with apparent cortical discontinuity at the lower sacrum as described new since prior and concerning for sequela of fracture of indeterminate age. Focal sclerotic appearance at the mid to lower sacrum centrally on the AP projection of uncertain significance but could be related. There is some regional soft tissue swelling. Underlying infectious/inflammatory process cannot be excluded. There is also irregularity at the superior left pubic ramus on 1 projection of uncertain significance but with underlying nondisplaced fracture not excluded. Clinical correlation and follow-up advised and consider dedicated CT or MRI for further assessment.   Patchy sclerotic appearance at the femoral heads bilaterally left greater than right can be seen in the setting of avascular necrosis. This could also be further evaluated by CT or MRI.   Partially imaged aorto  bi-iliac stent graft. Curvilinear calcification extending to the left at the lower abdomen likely partially imaged peripherally calcified large abdominal aortic aneurysmal sac which was present previously but otherwise evaluation limited on this examination.   MACRO: None   Signed by: Darnell Contreras 11/28/2023 1:35 PM Dictation workstation:   [id number]    XR chest 1 view    Result Date: 11/26/2023  Interpreted By:  Prosper Olivas, STUDY: XR CHEST 1 VIEW;  11/26/2023 10:54 am   INDICATION: Signs/Symptoms:Weakness.   COMPARISON: Portable chest, 7 September 2023   ACCESSION NUMBER(S): [id number]   ORDERING CLINICIAN: NICKY SPENCER   TECHNIQUE: Single frontal view of the chest; Portable technique   FINDINGS:   The cardiomediastinal silhouette is unchanged   No consolidative lung opacity   No edema / failure   No large pleural effusion or demonstrable pneumothorax       NO ACUTE DISEASE IN THE CHEST   MACRO: None   Signed by: Prosper Olivas 11/26/2023 11:18 AM Dictation workstation:   [id number]       Assessment/Plan:    S5 Chronic Osteomyelitis  COVID-19, asymptomatic - tested positive 11/26  Staph UTI    Abdominal aortic aneurysm s/p repair 01/2022; endoleak of aortic graft no interventions per patient's wishes  Severe Aortic Stenosis  CAD s/p PCI of the RCA  HTN, HLD  Hypothyroidism  Vocal Cord Paralysis following thyroidectomy  CKD Stage III  Nephrolithiasis  Urinary Retention  PVD  Anemia  OA  DJD  Breast Cancer  Skin Cancer    Continue with Vanc and Merrem, will clarify with PCP if she should complete conservative therapy for the chronic osteomyelitis or if aggressive therapy would be justified entailing 6 weeks of antibiotics and ECF placement.    Antibiotics: Rocephin (11/26-11/27) Ceftin (11/28-11/29), Merrem (12/01 - ), Vanc (11/30 - )    I have reviewed and interpreted all lab test imaging studies and documentations from other healthcare providers. Discussed antibiotics and potential side effects with  patient.     This is a preliminary note, please await attending attestation for a finalized plan.    Bipin Chaparro MD  Infectious Disease

## 2023-12-04 NOTE — CARE PLAN
The patient's goals for the shift include  Patient will increase activty during shift    The clinical goals for the shift include Patient will not c/o SOB during shift    Over the shift, the patient did not make progress toward the following goals. Barriers to progression include Patient still very weak during shift. Recommendations to address these barriers include continue to monitor.

## 2023-12-04 NOTE — PROGRESS NOTES
Regi Leger is a 89 y.o. female on day 6 of admission presenting with UTI (urinary tract infection).      Subjective   Patient fully evaluated on November 27.  Improved but still very weak and probably will require skilled nursing.       Objective     Last Recorded Vitals  /61   Pulse 64   Temp 36.7 °C (98.1 °F) (Temporal)   Resp 16   Wt 51.3 kg (113 lb 1.5 oz)   SpO2 95%   Intake/Output last 3 Shifts:    Intake/Output Summary (Last 24 hours) at 12/4/2023 1516  Last data filed at 12/4/2023 0600  Gross per 24 hour   Intake 440 ml   Output 1000 ml   Net -560 ml         Admission Weight  Weight: 51.3 kg (113 lb) (11/26/23 0916)    Daily Weight  11/30/23 : 51.3 kg (113 lb 1.5 oz)    Image Results  CT abdomen pelvis wo IV contrast  Narrative: Interpreted By:  Darryl Osborn,   STUDY:  CT ABDOMEN PELVIS WO IV CONTRAST;  11/30/2023 3:57 am      INDICATION:  Signs/Symptoms:sacral pain.      COMPARISON:  08/15/2022      ACCESSION NUMBER(S):  [id number]      ORDERING CLINICIAN:  DEBBIE FLORES      TECHNIQUE:  CT of the abdomen and pelvis was performed. Contiguous axial images  were obtained at 3 mm slice thickness through the abdomen and pelvis.  Coronal and sagittal reconstructions at 3 mm slice thickness were  performed.  Intravenous or oral contrast was not administered.      FINDINGS:  LOWER CHEST:  Small bilateral pleural effusions with compressive atelectasis.  Radiodensity at the right coronary artery would indicate  atherosclerotic calcification or stent.      ABDOMEN:      LIVER:  There is increased attenuation of the liver indicating hemosiderosis  or possibly amiodarone therapy. The dome of the liver was not  included, the patient was not recalled for additional imaging at this  time due to COVID precautions. If there is clinical concern of  hepatic pathology additional imaging possible could be obtained if  requested. The hepatic parenchyma otherwise is homogeneous. The  hepatic size is normal.       SPLEEN:  The spleen is normal in size and homogeneous.      ADRENAL GLANDS:  Bilateral adrenal glands appear normal.      KIDNEYS AND URETERS:  There is moderate left renal cortical atrophy, measuring 7.8 cm in  length. There are several left renal cortical cysts. The right renal  cortex is maintained.      There are several nonobstructing intrarenal calculi, the largest  measuring 8 mm at the right interpole. The distal right ureteral  course is poorly visualized due to crowded overlying bowel loops.  Otherwise no identified urinary tract dilatation or radiodense  calculi.      PANCREAS:  Again as previously there are multiple hypodensities, the largest  measuring 2.1 cm at the neck of relative fluid attenuation indicating  cyst or cystic/mucinous neoplasm. No ductal dilatation.      GALLBLADDER:  No radiodense calculi, wall thickening or pericholecystic fluid.      BILE DUCTS:  There is no biliary dilatation or filling defects.          VESSELS:  Again there is an aortic-bi iliac endovascular stent extending  through a bimodal abdominal aortic aneurysm. The latter measures up  to 3.9 cm at the L3 level, with more saccular dilatation measuring  4.7 cm at the bifurcation, similar to the prior exam.      PERITONEUM AND RETROPERITONEUM:  Trace ascites is seen at the pelvis. No free intraperitoneal air.      The retroperitoneum appears unremarkable, and without significant  adenopathy.      No enlarged mesenteric lymph nodes.      BOWEL:  Fecal residue seen throughout the colon which is mildly dilated,  greatest at the rectosigmoid colon measuring 6.6 cm in diameter. This  is most likely due to constipation probably early impaction. There is  mild reticulation of the presacral space probably from dependent  edema. The stomach and small bowel segments appear unremarkable.      PELVIS:      BLADDER:  The urinary bladder contour is smooth.      REPRODUCTIVE ORGANS:  Status post hysterectomy.          BONE, ABDOMINAL  WALL AND OTHER FINDINGS:  Of note is sclerosis of the S5 vertebral body not evident on the  previous exam. This is nonspecific, but may be due to chronic  osteomyelitis as there is a left paramedian subcutaneous/soft tissue  fistula tract from approximately this level to the tip of the coccyx  is slightly more inferiorly to the inferior margin of the gluteal  fold best seen on image numbers 108 through 132 of series 201.  associated subcutaneous reticulation extends more superiorly to the  S3 level. However, as the fistula tract itself is not appear to  contact the S5 vertebral body, sclerosis due to developing stress  fracture is not excluded. A sclerotic metastatic lesion is considered  unlikely as there are no other bony sclerotic foci, but not  completely excluded. No evidence of additional osteomyelitis  including the coccyx. However if more definitive exclusion of such is  needed correlation with MRI and radionuclide imaging as warranted  would be recommended.      The abdominal wall soft tissues appear normal.      Impression: 1.  Sclerosis of the S5 vertebral body, suspicious for chronic  osteomyelitis as there is a locoregional superficial subcutaneous  fistula tract as described.  2. Increased attenuation of the hepatic parenchyma, probably due to  hemosiderosis, or can occur with amiodarone therapy. The dome of the  liver was not included.  3. Trace ascites.  4. Probable constipation and possibly impaction.  5. Redemonstration of pancreatic cyst or cystic/mucinous neoplasms.  6. Moderate left renal cortical atrophy with bilateral nonobstructing  intrarenal calculi.  7. Stable abdominal aortic aneurysms with endovascular stents.      MACRO:  1. None      Signed by: Darryl Osborn 11/30/2023 9:13 AM  Dictation workstation:   [id number]      Physical Exam    Relevant Results               Assessment/Plan                  Varghese Vasquez MD  Physician  Internal Medicine     H&P      Addendum     Date of  Service: 11/26/2023  2:46 PM     Addendum       Expand All Collapse All    History Of Present Illness  Regi Leger is a 89 y.o. female with past medical history of abdominal aortic aneurysm s/p repair (01/2022), endoleak of aortic graft (no intervention pursued per patient's wishes), severe aortic stenosis, CAD s/p PCI/stent RCA, hypertension, hyperlipidemia, hypothyroidism, vocal cord paralysis following thyroidectomy,  CKD stage III, nephrolithiasis, urinary retention, PVD, anemia, osteoarthritis, DJD, breast cancer, and skin cancer who presented today with weakness.  HPI somewhat limited due to patient is hard of hearing.  She reports over the past few days she has been experiencing progressively worsening weakness.  She reports she became concerned that she may fall so she came to the emergency room for evaluation.  She admits to associated body aches that she has been taking over-the-counter medicine for.  She denies any fevers, chills, chest pain, shortness of breath, cough, abdominal pain, nausea, vomiting, diarrhea, or dysuria, changes in her chronic urinary incontinence.     In the ED lab work, EKG, and chest x-ray were performed. Labs revealed glucose 102, sodium 135 (chronically mildly low), bun 26 (chronic), , troponin 20, glucose 22, chronic anemia noted with red blood cell count 2.44, hemoglobin 10.5, and hematocrit 32.8, neutrophils 5.98, lymphocytes 0.74 UA revealed yellow hazy urine with moderate blood urobilinogen 2, large leuks.  Chest x-ray revealed no acute process.  EKG, per ER physician impression revealed normal sinus rhythm at a rate of 67 with nonspecific changes.  Vital signs were stable while in the ED.  She was given Rocephin and admitted to Dr. Vasquez.     10 point ROS negative except as noted above in HPI     Past medical history: As above  Past surgical history: Thyroidectomy, hysterectomy, PTCA/PCI with stent to RCA, AAA repair, cataract surgery  Social history: No history  "of smoking, alcohol abuse, or illicit drug use.  Lives with sister and nephew.  Ambulates with walker.  Family history: Father-stomach cancer; mother-myocardial infarction     Allergies  Pravastatin and Penicillins     Physical Exam  Constitutional:       Comments: Thin   HENT:      Head: Normocephalic and atraumatic.      Mouth/Throat:      Mouth: Mucous membranes are dry.   Eyes:      Conjunctiva/sclera: Conjunctivae normal.   Cardiovascular:      Rate and Rhythm: Normal rate and regular rhythm.      Heart sounds: Murmur heard.   Pulmonary:      Effort: Pulmonary effort is normal.      Breath sounds: No rales.   Abdominal:      General: Abdomen is flat. Bowel sounds are normal.      Palpations: Abdomen is soft.   Musculoskeletal:         General: Normal range of motion.      Cervical back: Neck supple.   Neurological:      Mental Status: She is alert. Mental status is at baseline.   Psychiatric:         Mood and Affect: Mood normal.            Last Recorded Vitals  Blood pressure (!) 155/97, pulse 77, temperature 36.5 °C (97.7 °F), temperature source Tympanic, resp. rate 25, height 1.727 m (5' 8\"), weight 51.3 kg (113 lb), SpO2 97 %.     Relevant Results     No current facility-administered medications on file prior to encounter.             Current Outpatient Medications on File Prior to Encounter   Medication Sig Dispense Refill    amLODIPine (Norvasc) 10 mg tablet Take 1 tablet (10 mg) by mouth once daily.        anastrozole (Arimidex) 1 mg tablet Take 1 tablet (1 mg total) by mouth once daily.        aspirin 81 mg EC tablet Take 1 tablet (81 mg) by mouth once daily.        cholecalciferol (Vitamin D-3) 50 MCG (2000 UT) tablet Take 1 tablet (2,000 Units) by mouth once daily.        cyanocobalamin (Vitamin B-12) 1,000 mcg tablet Take 1 tablet (1,000 mcg) by mouth once daily.        levothyroxine (Synthroid) 75 mcg tablet Take 1 tablet (75 mcg) by mouth once a day on Monday, Wednesday, and Friday.        " atorvastatin (Lipitor) 20 mg tablet Take 1 tablet (20 mg) by mouth once daily.        levothyroxine (Synthroid, Levoxyl) 75 mcg tablet Take 0.5 tablets (37.5 mcg) by mouth once a day on Sunday, Tuesday, Thursday, and Saturday.                Results for orders placed or performed during the hospital encounter of 11/26/23 (from the past 24 hour(s))   CBC and Auto Differential   Result Value Ref Range     WBC 7.6 4.4 - 11.3 x10*3/uL     nRBC 0.0 0.0 - 0.0 /100 WBCs     RBC 3.44 (L) 4.00 - 5.20 x10*6/uL     Hemoglobin 10.5 (L) 12.0 - 16.0 g/dL     Hematocrit 32.8 (L) 36.0 - 46.0 %     MCV 95 80 - 100 fL     MCH 30.5 26.0 - 34.0 pg     MCHC 32.0 32.0 - 36.0 g/dL     RDW 13.9 11.5 - 14.5 %     Platelets 277 150 - 450 x10*3/uL     Neutrophils % 78.5 40.0 - 80.0 %     Immature Granulocytes %, Automated 0.4 0.0 - 0.9 %     Lymphocytes % 9.7 13.0 - 44.0 %     Monocytes % 8.7 2.0 - 10.0 %     Eosinophils % 2.0 0.0 - 6.0 %     Basophils % 0.7 0.0 - 2.0 %     Neutrophils Absolute 5.98 (H) 1.60 - 5.50 x10*3/uL     Immature Granulocytes Absolute, Automated 0.03 0.00 - 0.50 x10*3/uL     Lymphocytes Absolute 0.74 (L) 0.80 - 3.00 x10*3/uL     Monocytes Absolute 0.66 0.05 - 0.80 x10*3/uL     Eosinophils Absolute 0.15 0.00 - 0.40 x10*3/uL     Basophils Absolute 0.05 0.00 - 0.10 x10*3/uL   Comprehensive metabolic panel   Result Value Ref Range     Glucose 102 (H) 74 - 99 mg/dL     Sodium 135 (L) 136 - 145 mmol/L     Potassium 4.5 3.5 - 5.3 mmol/L     Chloride 99 98 - 107 mmol/L     Bicarbonate 29 21 - 32 mmol/L     Anion Gap 12 10 - 20 mmol/L     Urea Nitrogen 26 (H) 6 - 23 mg/dL     Creatinine 0.91 0.50 - 1.05 mg/dL     eGFR 59 (L) >60 mL/min/1.73m*2     Calcium 9.3 8.6 - 10.3 mg/dL     Albumin 3.8 3.4 - 5.0 g/dL     Alkaline Phosphatase 73 33 - 136 U/L     Total Protein 7.3 6.4 - 8.2 g/dL     AST 20 9 - 39 U/L     Bilirubin, Total 0.4 0.0 - 1.2 mg/dL     ALT 11 7 - 45 U/L   Troponin I, High Sensitivity   Result Value Ref Range      Troponin I, High Sensitivity 20 (H) 0 - 13 ng/L   B-Type Natriuretic Peptide   Result Value Ref Range      (H) 0 - 99 pg/mL   Urinalysis with Reflex Microscopic and Culture   Result Value Ref Range     Color, Urine Yellow Straw, Yellow     Appearance, Urine Hazy (N) Clear     Specific Gravity, Urine 1.015 1.005 - 1.035     pH, Urine 5.0 5.0, 5.5, 6.0, 6.5, 7.0, 7.5, 8.0     Protein, Urine NEGATIVE NEGATIVE mg/dL     Glucose, Urine NEGATIVE NEGATIVE mg/dL     Blood, Urine MODERATE (2+) (A) NEGATIVE     Ketones, Urine NEGATIVE NEGATIVE mg/dL     Bilirubin, Urine NEGATIVE NEGATIVE     Urobilinogen, Urine 2.0 (N) <2.0 mg/dL     Nitrite, Urine NEGATIVE NEGATIVE     Leukocyte Esterase, Urine LARGE (3+) (A) NEGATIVE   Extra Urine Gray Tube   Result Value Ref Range     Extra Tube Hold for add-ons.     Microscopic Only, Urine   Result Value Ref Range     WBC, Urine 21-50 (A) 1-5, NONE /HPF     RBC, Urine 3-5 NONE, 1-2, 3-5 /HPF     Squamous Epithelial Cells, Urine 1-9 (SPARSE) Reference range not established. /HPF     Bacteria, Urine 1+ (A) NONE SEEN /HPF         XR chest 1 view     Result Date: 11/26/2023  Interpreted By:  Prosper Olivas, STUDY: XR CHEST 1 VIEW;  11/26/2023 10:54 am   INDICATION: Signs/Symptoms:Weakness.   COMPARISON: Portable chest, 7 September 2023   ACCESSION NUMBER(S): [id number]   ORDERING CLINICIAN: NICKY SPENCER   TECHNIQUE: Single frontal view of the chest; Portable technique   FINDINGS:   The cardiomediastinal silhouette is unchanged   No consolidative lung opacity   No edema / failure   No large pleural effusion or demonstrable pneumothorax        NO ACUTE DISEASE IN THE CHEST   MACRO: None   Signed by: Prosper Olivas 11/26/2023 11:18 AM Dictation workstation:   [id number]          Assessment/Plan   Principal Problem:    UTI (urinary tract infection)  Body Aches   Elevated troponin  Weakness     Admit to [facility]  Observation  Continue rocephin  urine culture pending      CBC, BMP in  am  PT/OT  Repeat troponin and EKG     Chronic conditions: Hypertension, hyperlipidemia, hypothyroidism, coronary artery disease, PVD, breast cancer     Continue home medications as listed above     DVT prophylaxis     SCDs  Lovenox           I spent 45 minutes in the professional and overall care of this patient.     Patient fully evaluated plan as above.  Harmony Estrada, APRN-CNP                 Revision History    Patient fully evaluated on November 27 and Decadron orally added for myalgias.  Await urine culture continue IV Rocephin.  Recheck labs in AM.  Lidoderm patch to sacrum for pain      Principal Problem:    UTI (urinary tract infection)  Active Problems:    Generalized weakness          Patient fully evaluated on November 28.  Still with significant sacral pain.  X-rays to be ordered.  Continue present medications transition to oral antibiotics and monitor.     Patient fully evaluated on November 29.  Awaiting final urine sensitivity.  Formerly and sacrum noted on plain x-ray and orthopedic consultation pain.  CAT scan ordered at that area.  Neurology consultation for tremors.    Patient fully evaluated on November 30.  Results of CAT scan are noted and patient started on IV antibiotics pending further infectious disease and orthopedic recommendations.  Recheck labs in AM.    Patient fully evaluated on December 1.  Continue IV antibiotics.  His await orthopedic input if necessary.  Case discussed with infectious disease.  Recheck labs in AM.    Patient fully evaluated on December 2.  Continue present IV antibiotics.  Primidone added for tremors.  Await final cultures.  Recheck labs in AM.    Patient fully evaluated on December 3.  Tremors have improved with primidone.  Await final recommendations from infectious disease and ST antibiotics on discharge.  Patient planning on discharge home with home care.    Patient fully evaluated on December 4.  Continue present IV antibiotics.  Discussed with patient  tomorrow regarding.  She wants to do a 6-week course of IV antibiotics.  Patient does have recurrent sacral pain.  Varghese Vasquez MD

## 2023-12-05 LAB
ALBUMIN SERPL BCP-MCNC: 2.7 G/DL (ref 3.4–5)
ALP SERPL-CCNC: 44 U/L (ref 33–136)
ALT SERPL W P-5'-P-CCNC: 14 U/L (ref 7–45)
ANION GAP SERPL CALC-SCNC: 11 MMOL/L (ref 10–20)
AST SERPL W P-5'-P-CCNC: 18 U/L (ref 9–39)
BILIRUB SERPL-MCNC: 0.3 MG/DL (ref 0–1.2)
BUN SERPL-MCNC: 48 MG/DL (ref 6–23)
CALCIUM SERPL-MCNC: 8.2 MG/DL (ref 8.6–10.3)
CHLORIDE SERPL-SCNC: 97 MMOL/L (ref 98–107)
CO2 SERPL-SCNC: 27 MMOL/L (ref 21–32)
CREAT SERPL-MCNC: 1 MG/DL (ref 0.5–1.05)
ERYTHROCYTE [DISTWIDTH] IN BLOOD BY AUTOMATED COUNT: 13.8 % (ref 11.5–14.5)
GFR SERPL CREATININE-BSD FRML MDRD: 53 ML/MIN/1.73M*2
GLUCOSE SERPL-MCNC: 86 MG/DL (ref 74–99)
HCT VFR BLD AUTO: 27 % (ref 36–46)
HGB BLD-MCNC: 8.6 G/DL (ref 12–16)
MCH RBC QN AUTO: 30.1 PG (ref 26–34)
MCHC RBC AUTO-ENTMCNC: 31.9 G/DL (ref 32–36)
MCV RBC AUTO: 94 FL (ref 80–100)
NRBC BLD-RTO: 0 /100 WBCS (ref 0–0)
PLATELET # BLD AUTO: 231 X10*3/UL (ref 150–450)
POTASSIUM SERPL-SCNC: 5.4 MMOL/L (ref 3.5–5.3)
PROT SERPL-MCNC: 5.1 G/DL (ref 6.4–8.2)
RBC # BLD AUTO: 2.86 X10*6/UL (ref 4–5.2)
SODIUM SERPL-SCNC: 130 MMOL/L (ref 136–145)
VANCOMYCIN SERPL-MCNC: 5.3 UG/ML (ref 5–20)
WBC # BLD AUTO: 8.6 X10*3/UL (ref 4.4–11.3)

## 2023-12-05 PROCEDURE — 85027 COMPLETE CBC AUTOMATED: CPT | Performed by: INTERNAL MEDICINE

## 2023-12-05 PROCEDURE — 80053 COMPREHEN METABOLIC PANEL: CPT | Performed by: INTERNAL MEDICINE

## 2023-12-05 PROCEDURE — 1100000001 HC PRIVATE ROOM DAILY

## 2023-12-05 PROCEDURE — 2500000004 HC RX 250 GENERAL PHARMACY W/ HCPCS (ALT 636 FOR OP/ED): Performed by: NURSE PRACTITIONER

## 2023-12-05 PROCEDURE — 80202 ASSAY OF VANCOMYCIN: CPT | Performed by: INTERNAL MEDICINE

## 2023-12-05 PROCEDURE — 2500000004 HC RX 250 GENERAL PHARMACY W/ HCPCS (ALT 636 FOR OP/ED): Performed by: INTERNAL MEDICINE

## 2023-12-05 PROCEDURE — 36415 COLL VENOUS BLD VENIPUNCTURE: CPT | Performed by: INTERNAL MEDICINE

## 2023-12-05 PROCEDURE — 97535 SELF CARE MNGMENT TRAINING: CPT | Mod: GP

## 2023-12-05 PROCEDURE — 96372 THER/PROPH/DIAG INJ SC/IM: CPT | Performed by: NURSE PRACTITIONER

## 2023-12-05 PROCEDURE — 2500000005 HC RX 250 GENERAL PHARMACY W/O HCPCS: Performed by: INTERNAL MEDICINE

## 2023-12-05 PROCEDURE — 2500000001 HC RX 250 WO HCPCS SELF ADMINISTERED DRUGS (ALT 637 FOR MEDICARE OP): Performed by: NURSE PRACTITIONER

## 2023-12-05 RX ORDER — MEROPENEM 1 G/1
1000 INJECTION, POWDER, FOR SOLUTION INTRAVENOUS EVERY 12 HOURS
Start: 2023-12-05

## 2023-12-05 RX ORDER — ACETAMINOPHEN 325 MG/1
650 TABLET ORAL EVERY 4 HOURS PRN
Qty: 30 TABLET | Refills: 0 | OUTPATIENT
Start: 2023-12-05 | End: 2024-01-24

## 2023-12-05 RX ORDER — ENOXAPARIN SODIUM 100 MG/ML
30 INJECTION SUBCUTANEOUS EVERY 24 HOURS
Start: 2023-12-06

## 2023-12-05 RX ORDER — PRIMIDONE 50 MG/1
50 TABLET ORAL 3 TIMES DAILY
Start: 2023-12-05

## 2023-12-05 RX ORDER — VANCOMYCIN HYDROCHLORIDE 1 G/200ML
1 INJECTION, SOLUTION INTRAVENOUS EVERY 24 HOURS
Start: 2023-12-06

## 2023-12-05 RX ORDER — L. ACIDOPHILUS/L.BULGARICUS 1MM CELL
1 TABLET ORAL 2 TIMES DAILY
Start: 2023-12-05

## 2023-12-05 RX ADMIN — POLYETHYLENE GLYCOL 3350 17 G: 17 POWDER, FOR SOLUTION ORAL at 09:30

## 2023-12-05 RX ADMIN — ENOXAPARIN SODIUM 30 MG: 30 INJECTION SUBCUTANEOUS at 14:47

## 2023-12-05 RX ADMIN — LIDOCAINE 1 PATCH: 4 PATCH TOPICAL at 09:30

## 2023-12-05 RX ADMIN — MEROPENEM 1000 MG: 1 INJECTION, POWDER, FOR SOLUTION INTRAVENOUS at 21:28

## 2023-12-05 RX ADMIN — PRIMIDONE 75 MG: 50 TABLET ORAL at 09:00

## 2023-12-05 RX ADMIN — LEVOTHYROXINE SODIUM 37.5 MCG: 0.07 TABLET ORAL at 14:46

## 2023-12-05 RX ADMIN — CHOLECALCIFEROL TAB 25 MCG (1000 UNIT) 2000 UNITS: 25 TAB at 09:29

## 2023-12-05 RX ADMIN — ANASTROZOLE 1 MG: 1 TABLET, COATED ORAL at 09:30

## 2023-12-05 RX ADMIN — ASPIRIN 81 MG: 81 TABLET, COATED ORAL at 09:30

## 2023-12-05 RX ADMIN — Medication 1 TABLET: at 09:30

## 2023-12-05 RX ADMIN — DEXAMETHASONE 6 MG: 6 TABLET ORAL at 09:29

## 2023-12-05 RX ADMIN — AMLODIPINE BESYLATE 10 MG: 10 TABLET ORAL at 09:30

## 2023-12-05 RX ADMIN — CYANOCOBALAMIN TAB 1000 MCG 1000 MCG: 1000 TAB at 09:31

## 2023-12-05 RX ADMIN — ATORVASTATIN CALCIUM 20 MG: 20 TABLET, FILM COATED ORAL at 09:31

## 2023-12-05 RX ADMIN — PRIMIDONE 75 MG: 50 TABLET ORAL at 21:27

## 2023-12-05 RX ADMIN — MEROPENEM 1000 MG: 1 INJECTION, POWDER, FOR SOLUTION INTRAVENOUS at 09:59

## 2023-12-05 RX ADMIN — ACETAMINOPHEN 650 MG: 325 TABLET ORAL at 21:29

## 2023-12-05 RX ADMIN — PRIMIDONE 75 MG: 50 TABLET ORAL at 14:46

## 2023-12-05 RX ADMIN — VANCOMYCIN HYDROCHLORIDE 1000 MG: 1 INJECTION, SOLUTION INTRAVENOUS at 14:47

## 2023-12-05 RX ADMIN — Medication 1 TABLET: at 21:28

## 2023-12-05 ASSESSMENT — COGNITIVE AND FUNCTIONAL STATUS - GENERAL
MOBILITY SCORE: 9
PERSONAL GROOMING: A LITTLE
MOBILITY SCORE: 9
CLIMB 3 TO 5 STEPS WITH RAILING: TOTAL
DAILY ACTIVITIY SCORE: 19
TOILETING: A LITTLE
MOVING TO AND FROM BED TO CHAIR: TOTAL
MOVING FROM LYING ON BACK TO SITTING ON SIDE OF FLAT BED WITH BEDRAILS: A LITTLE
MOVING FROM LYING ON BACK TO SITTING ON SIDE OF FLAT BED WITH BEDRAILS: A LITTLE
STANDING UP FROM CHAIR USING ARMS: TOTAL
MOVING TO AND FROM BED TO CHAIR: TOTAL
DAILY ACTIVITIY SCORE: 19
PERSONAL GROOMING: A LITTLE
HELP NEEDED FOR BATHING: A LITTLE
WALKING IN HOSPITAL ROOM: TOTAL
STANDING UP FROM CHAIR USING ARMS: TOTAL
DRESSING REGULAR LOWER BODY CLOTHING: A LITTLE
DRESSING REGULAR LOWER BODY CLOTHING: A LITTLE
CLIMB 3 TO 5 STEPS WITH RAILING: TOTAL
TURNING FROM BACK TO SIDE WHILE IN FLAT BAD: A LOT
WALKING IN HOSPITAL ROOM: TOTAL
DRESSING REGULAR UPPER BODY CLOTHING: A LITTLE
TOILETING: A LITTLE
HELP NEEDED FOR BATHING: A LITTLE
DRESSING REGULAR UPPER BODY CLOTHING: A LITTLE
TURNING FROM BACK TO SIDE WHILE IN FLAT BAD: A LOT

## 2023-12-05 ASSESSMENT — PAIN - FUNCTIONAL ASSESSMENT
PAIN_FUNCTIONAL_ASSESSMENT: 0-10

## 2023-12-05 ASSESSMENT — PAIN SCALES - GENERAL
PAINLEVEL_OUTOF10: 3
PAINLEVEL_OUTOF10: 0 - NO PAIN
PAINLEVEL_OUTOF10: 0 - NO PAIN

## 2023-12-05 NOTE — PROGRESS NOTES
Physical Therapy    Physical Therapy Treatment    Patient Name: Regi Leger  MRN: [id number]  Today's Date: 12/5/2023  Time Calculation  Start Time: 0930  Stop Time: 0955  Time Calculation (min): 25 min       Assessment/Plan         PT Plan  Treatment/Interventions: Bed mobility, Transfer training, Gait training, Strengthening  PT Plan: Skilled PT  PT Frequency: 3 times per week  PT Discharge Recommendations: Moderate intensity level of continued care  PT - OK to Discharge: Yes      General Visit Information:   PT  Visit  PT Received On: 12/05/23       Subjective   Precautions:  Precautions  Medical Precautions:  (COVID positive.  Droplet precautions)  Precautions Comment: Fall precautions     Objective   Pain:  Pain Assessment  Pain Assessment: 0-10  Pain Score: 0 - No pain    Treatments:  Therapeutic Activity  Therapeutic Activity Performed:  (Pt stood with walker for 1 minute x2 and 30 seconds x1 with walker and mod/max assist x2.  Poor standing posture:  flexed at hips and knees, poor LE wt bearing, trunk and head forward flexion, left hip internal rotation.)    Balance/Neuromuscular Re-Education  Balance/Neuromuscular Re-Education Activity Performed:  (Pt seated edge of bed for static sitting balance, postural control and core strengthening ex x15 minutes with min assist overall.)    Bed Mobility  Bed Mobility:  (Bed mobility training:  Supine to sit with max assist x2.  Sit to supine with min assist x1.)       Transfers  Transfer:  (Repeated sit to stand transfer training from elevated surface with mod/max assist x2.)      Education Documentation  Mobility Training, taught by Inocencia Streeter, PT at 12/5/2023 10:40 AM.  Learner: Patient  Readiness: Eager  Method: Explanation, Demonstration  Response: Verbalizes Understanding, Needs Reinforcement    EDUCATION:  Outpatient Education  Education Comment:  (Pt educated on safe mobility techniques, treatment intervention and goals of treatment.)    Encounter  Problems       Encounter Problems (Active)       PT Problem       PT Goal 1 (Progressing)       Start:  11/27/23    Expected End:  12/11/23       Indep bed mob          PT Goal 2 (Progressing)       Start:  11/27/23    Expected End:  12/11/23       Min of 1 txs         PT Goal 3 (Progressing)       Start:  11/27/23    Expected End:  12/11/23       Min of 1 amb w. Rw 40ft x3 laps in room          PT Goal 4 (Progressing)       Start:  11/27/23    Expected End:  12/11/23       20-30 reps ble there ex            Pain - Adult

## 2023-12-05 NOTE — CARE PLAN
The patient's goals for the shift include  remaining free from pain    The clinical goals for the shift include Patient will not report any SOB during shift

## 2023-12-05 NOTE — PROGRESS NOTES
"Infectious Disease Progress Note    Patient Name: Regi Leger   YOB: 1930    Subjective:      Objective:    /66 (BP Location: Left arm, Patient Position: Lying)   Pulse 63   Temp 36.6 °C (97.9 °F) (Temporal)   Resp 18   Ht 1.753 m (5' 9.02\")   Wt 51.3 kg (113 lb 1.5 oz)   LMP  (LMP Unknown)   SpO2 99%   BMI 16.69 kg/m²       Susceptibility data from last 90 days.  Collected Specimen Info Organism Nitrofurantoin Oxacillin Trimethoprim/Sulfamethoxazole Vancomycin   11/26/23 Urine from Straight Catheter Staphylococcus simulans S S S S         CT abdomen pelvis wo IV contrast    Result Date: 11/30/2023  Interpreted By:  Darryl Osborn, STUDY: CT ABDOMEN PELVIS WO IV CONTRAST;  11/30/2023 3:57 am   INDICATION: Signs/Symptoms:sacral pain.   COMPARISON: 08/15/2022   ACCESSION NUMBER(S): [id number]   ORDERING CLINICIAN: DEBBIE FLORES   TECHNIQUE: CT of the abdomen and pelvis was performed. Contiguous axial images were obtained at 3 mm slice thickness through the abdomen and pelvis. Coronal and sagittal reconstructions at 3 mm slice thickness were performed.  Intravenous or oral contrast was not administered.   FINDINGS: LOWER CHEST: Small bilateral pleural effusions with compressive atelectasis. Radiodensity at the right coronary artery would indicate atherosclerotic calcification or stent.   ABDOMEN:   LIVER: There is increased attenuation of the liver indicating hemosiderosis or possibly amiodarone therapy. The dome of the liver was not included, the patient was not recalled for additional imaging at this time due to COVID precautions. If there is clinical concern of hepatic pathology additional imaging possible could be obtained if requested. The hepatic parenchyma otherwise is homogeneous. The hepatic size is normal.   SPLEEN: The spleen is normal in size and homogeneous.   ADRENAL GLANDS: Bilateral adrenal glands appear normal.   KIDNEYS AND URETERS: There is moderate left renal " cortical atrophy, measuring 7.8 cm in length. There are several left renal cortical cysts. The right renal cortex is maintained.   There are several nonobstructing intrarenal calculi, the largest measuring 8 mm at the right interpole. The distal right ureteral course is poorly visualized due to crowded overlying bowel loops. Otherwise no identified urinary tract dilatation or radiodense calculi.   PANCREAS: Again as previously there are multiple hypodensities, the largest measuring 2.1 cm at the neck of relative fluid attenuation indicating cyst or cystic/mucinous neoplasm. No ductal dilatation.   GALLBLADDER: No radiodense calculi, wall thickening or pericholecystic fluid.   BILE DUCTS: There is no biliary dilatation or filling defects.     VESSELS: Again there is an aortic-bi iliac endovascular stent extending through a bimodal abdominal aortic aneurysm. The latter measures up to 3.9 cm at the L3 level, with more saccular dilatation measuring 4.7 cm at the bifurcation, similar to the prior exam.   PERITONEUM AND RETROPERITONEUM: Trace ascites is seen at the pelvis. No free intraperitoneal air.   The retroperitoneum appears unremarkable, and without significant adenopathy.   No enlarged mesenteric lymph nodes.   BOWEL: Fecal residue seen throughout the colon which is mildly dilated, greatest at the rectosigmoid colon measuring 6.6 cm in diameter. This is most likely due to constipation probably early impaction. There is mild reticulation of the presacral space probably from dependent edema. The stomach and small bowel segments appear unremarkable.   PELVIS:   BLADDER: The urinary bladder contour is smooth.   REPRODUCTIVE ORGANS: Status post hysterectomy.     BONE, ABDOMINAL WALL AND OTHER FINDINGS: Of note is sclerosis of the S5 vertebral body not evident on the previous exam. This is nonspecific, but may be due to chronic osteomyelitis as there is a left paramedian subcutaneous/soft tissue fistula tract from  approximately this level to the tip of the coccyx is slightly more inferiorly to the inferior margin of the gluteal fold best seen on image numbers 108 through 132 of series 201. associated subcutaneous reticulation extends more superiorly to the S3 level. However, as the fistula tract itself is not appear to contact the S5 vertebral body, sclerosis due to developing stress fracture is not excluded. A sclerotic metastatic lesion is considered unlikely as there are no other bony sclerotic foci, but not completely excluded. No evidence of additional osteomyelitis including the coccyx. However if more definitive exclusion of such is needed correlation with MRI and radionuclide imaging as warranted would be recommended.   The abdominal wall soft tissues appear normal.       1.  Sclerosis of the S5 vertebral body, suspicious for chronic osteomyelitis as there is a locoregional superficial subcutaneous fistula tract as described. 2. Increased attenuation of the hepatic parenchyma, probably due to hemosiderosis, or can occur with amiodarone therapy. The dome of the liver was not included. 3. Trace ascites. 4. Probable constipation and possibly impaction. 5. Redemonstration of pancreatic cyst or cystic/mucinous neoplasms. 6. Moderate left renal cortical atrophy with bilateral nonobstructing intrarenal calculi. 7. Stable abdominal aortic aneurysms with endovascular stents.   MACRO: 1. None   Signed by: Darryl Osborn 11/30/2023 9:13 AM Dictation workstation:   [id number]    XR sacrum coccyx 2+ views    Result Date: 11/28/2023  Interpreted By:  Darnell Contreras, STUDY: XR SACRUM COCCYX 2+ VIEWS; ;  11/28/2023 12:55 pm   INDICATION: Signs/Symptoms:PAIN.   COMPARISON: X-ray lumbar spine 06/24/2018   ACCESSION NUMBER(S): [id number]   ORDERING CLINICIAN: DEBBIE FLORES   FINDINGS: Three view sacrum/coccyx   On the lateral projection, there is deformity/irregularity and discontinuity of anterior and posterior cortical margins at the  lower sacrum with apparent associated cortical lucency particularly posteriorly new since prior. There is some regional soft tissue swelling. Degenerative changes visualized lower lumbar spine. There is also irregularity at the left superior pubic ramus on 1 projection. Mild degenerative changes of the visualized hip joints mild patchy sclerotic appearance at the femoral heads bilaterally left greater than right. There is also focal sclerotic appearance at the mid to lower sacrum centrally on the AP projection. Evaluation of sacrum and pelvic bones elsewhere otherwise limited by overlying bowel gas and fecal material. Partially imaged stent grafts in the lower aorta and iliac arteries.   Multiple calcific densities overlie the pelvis probably phleboliths and/or vascular calcifications. Curvilinear calcification partially imaged overlying the lower abdomen about the stent material on the left probably large peripherally calcified inferior abdominal aortic aneurysmal sac which was present previously but otherwise limited evaluation on this examination.       Deformity/irregularity with apparent cortical discontinuity at the lower sacrum as described new since prior and concerning for sequela of fracture of indeterminate age. Focal sclerotic appearance at the mid to lower sacrum centrally on the AP projection of uncertain significance but could be related. There is some regional soft tissue swelling. Underlying infectious/inflammatory process cannot be excluded. There is also irregularity at the superior left pubic ramus on 1 projection of uncertain significance but with underlying nondisplaced fracture not excluded. Clinical correlation and follow-up advised and consider dedicated CT or MRI for further assessment.   Patchy sclerotic appearance at the femoral heads bilaterally left greater than right can be seen in the setting of avascular necrosis. This could also be further evaluated by CT or MRI.   Partially imaged  aorto bi-iliac stent graft. Curvilinear calcification extending to the left at the lower abdomen likely partially imaged peripherally calcified large abdominal aortic aneurysmal sac which was present previously but otherwise evaluation limited on this examination.   MACRO: None   Signed by: Darnell Contreras 11/28/2023 1:35 PM Dictation workstation:   [id number]    XR chest 1 view    Result Date: 11/26/2023  Interpreted By:  Prosper Olivas, STUDY: XR CHEST 1 VIEW;  11/26/2023 10:54 am   INDICATION: Signs/Symptoms:Weakness.   COMPARISON: Portable chest, 7 September 2023   ACCESSION NUMBER(S): [id number]   ORDERING CLINICIAN: NICKY SPENCER   TECHNIQUE: Single frontal view of the chest; Portable technique   FINDINGS:   The cardiomediastinal silhouette is unchanged   No consolidative lung opacity   No edema / failure   No large pleural effusion or demonstrable pneumothorax       NO ACUTE DISEASE IN THE CHEST   MACRO: None   Signed by: Prosper Olivas 11/26/2023 11:18 AM Dictation workstation:   [id number]       Assessment/Plan:    S5 Chronic Osteomyelitis  COVID-19, asymptomatic - tested positive 11/26  Staph UTI    Abdominal aortic aneurysm s/p repair 01/2022; endoleak of aortic graft no interventions per patient's wishes  Severe Aortic Stenosis  CAD s/p PCI of the RCA  HTN, HLD  Hypothyroidism  Vocal Cord Paralysis following thyroidectomy  CKD Stage III  Nephrolithiasis  Urinary Retention  PVD  Anemia  OA  DJD  Breast Cancer  Skin Cancer    Continue with Vanc and Merrem, patient will need 6 weeks total antibiotics and PICC line (may be delayed due to COVID)    Antibiotics: Rocephin (11/26-11/27) Ceftin (11/28-11/29), Merrem (12/01 - ), Vanc (11/30 - )    I have reviewed and interpreted all lab test imaging studies and documentations from other healthcare providers. Discussed antibiotics and potential side effects with patient.     This is a preliminary note, please await attending attestation for a finalized  plan.    Bipin Chaparro MD  Infectious Disease

## 2023-12-05 NOTE — CARE PLAN
The clinical goals for the shift include Patient will not report any SOB during shift    Patient had no complaints of SOB throughout the shift.

## 2023-12-05 NOTE — DISCHARGE SUMMARY
Discharge Diagnosis  UTI (urinary tract infection)    Issues Requiring Follow-Up          Debbie Flores MD  Physician  Internal Medicine     Progress Notes      Signed     Date of Service: 12/4/2023  3:16 PM     Signed       Expand All Collapse All    Regi Leger is a 89 y.o. female on day 6 of admission presenting with UTI (urinary tract infection).           Subjective   Patient fully evaluated on November 27.  Improved but still very weak and probably will require skilled nursing.              Objective   Last Recorded Vitals  /61   Pulse 64   Temp 36.7 °C (98.1 °F) (Temporal)   Resp 16   Wt 51.3 kg (113 lb 1.5 oz)   SpO2 95%   Intake/Output last 3 Shifts:     Intake/Output Summary (Last 24 hours) at 12/4/2023 1516  Last data filed at 12/4/2023 0600      Gross per 24 hour   Intake 440 ml   Output 1000 ml   Net -560 ml            Admission Weight  Weight: 51.3 kg (113 lb) (11/26/23 0916)     Daily Weight  11/30/23 : 51.3 kg (113 lb 1.5 oz)     Image Results  CT abdomen pelvis wo IV contrast  Narrative: Interpreted By:  Darryl Osborn,   STUDY:  CT ABDOMEN PELVIS WO IV CONTRAST;  11/30/2023 3:57 am      INDICATION:  Signs/Symptoms:sacral pain.      COMPARISON:  08/15/2022      ACCESSION NUMBER(S):  [id number]      ORDERING CLINICIAN:  DEBBIE FLORES      TECHNIQUE:  CT of the abdomen and pelvis was performed. Contiguous axial images  were obtained at 3 mm slice thickness through the abdomen and pelvis.  Coronal and sagittal reconstructions at 3 mm slice thickness were  performed.  Intravenous or oral contrast was not administered.      FINDINGS:  LOWER CHEST:  Small bilateral pleural effusions with compressive atelectasis.  Radiodensity at the right coronary artery would indicate  atherosclerotic calcification or stent.      ABDOMEN:      LIVER:  There is increased attenuation of the liver indicating hemosiderosis  or possibly amiodarone therapy. The dome of the liver was not  included, the patient  was not recalled for additional imaging at this  time due to COVID precautions. If there is clinical concern of  hepatic pathology additional imaging possible could be obtained if  requested. The hepatic parenchyma otherwise is homogeneous. The  hepatic size is normal.      SPLEEN:  The spleen is normal in size and homogeneous.      ADRENAL GLANDS:  Bilateral adrenal glands appear normal.      KIDNEYS AND URETERS:  There is moderate left renal cortical atrophy, measuring 7.8 cm in  length. There are several left renal cortical cysts. The right renal  cortex is maintained.      There are several nonobstructing intrarenal calculi, the largest  measuring 8 mm at the right interpole. The distal right ureteral  course is poorly visualized due to crowded overlying bowel loops.  Otherwise no identified urinary tract dilatation or radiodense  calculi.      PANCREAS:  Again as previously there are multiple hypodensities, the largest  measuring 2.1 cm at the neck of relative fluid attenuation indicating  cyst or cystic/mucinous neoplasm. No ductal dilatation.      GALLBLADDER:  No radiodense calculi, wall thickening or pericholecystic fluid.      BILE DUCTS:  There is no biliary dilatation or filling defects.          VESSELS:  Again there is an aortic-bi iliac endovascular stent extending  through a bimodal abdominal aortic aneurysm. The latter measures up  to 3.9 cm at the L3 level, with more saccular dilatation measuring  4.7 cm at the bifurcation, similar to the prior exam.      PERITONEUM AND RETROPERITONEUM:  Trace ascites is seen at the pelvis. No free intraperitoneal air.      The retroperitoneum appears unremarkable, and without significant  adenopathy.      No enlarged mesenteric lymph nodes.      BOWEL:  Fecal residue seen throughout the colon which is mildly dilated,  greatest at the rectosigmoid colon measuring 6.6 cm in diameter. This  is most likely due to constipation probably early impaction. There is  mild  reticulation of the presacral space probably from dependent  edema. The stomach and small bowel segments appear unremarkable.      PELVIS:      BLADDER:  The urinary bladder contour is smooth.      REPRODUCTIVE ORGANS:  Status post hysterectomy.          BONE, ABDOMINAL WALL AND OTHER FINDINGS:  Of note is sclerosis of the S5 vertebral body not evident on the  previous exam. This is nonspecific, but may be due to chronic  osteomyelitis as there is a left paramedian subcutaneous/soft tissue  fistula tract from approximately this level to the tip of the coccyx  is slightly more inferiorly to the inferior margin of the gluteal  fold best seen on image numbers 108 through 132 of series 201.  associated subcutaneous reticulation extends more superiorly to the  S3 level. However, as the fistula tract itself is not appear to  contact the S5 vertebral body, sclerosis due to developing stress  fracture is not excluded. A sclerotic metastatic lesion is considered  unlikely as there are no other bony sclerotic foci, but not  completely excluded. No evidence of additional osteomyelitis  including the coccyx. However if more definitive exclusion of such is  needed correlation with MRI and radionuclide imaging as warranted  would be recommended.      The abdominal wall soft tissues appear normal.      Impression: 1.  Sclerosis of the S5 vertebral body, suspicious for chronic  osteomyelitis as there is a locoregional superficial subcutaneous  fistula tract as described.  2. Increased attenuation of the hepatic parenchyma, probably due to  hemosiderosis, or can occur with amiodarone therapy. The dome of the  liver was not included.  3. Trace ascites.  4. Probable constipation and possibly impaction.  5. Redemonstration of pancreatic cyst or cystic/mucinous neoplasms.  6. Moderate left renal cortical atrophy with bilateral nonobstructing  intrarenal calculi.  7. Stable abdominal aortic aneurysms with endovascular stents.       MACRO:  1. None      Signed by: Darryl Osborn 11/30/2023 9:13 AM  Dictation workstation:   [id number]        Physical Exam     Relevant Results                       Assessment/Plan                  Varghese Vasquez MD  Physician  Internal Medicine     H&P      Addendum     Date of Service: 11/26/2023  2:46 PM      Addendum        Expand All Collapse All    History Of Present Illness  Regi Leger is a 89 y.o. female with past medical history of abdominal aortic aneurysm s/p repair (01/2022), endoleak of aortic graft (no intervention pursued per patient's wishes), severe aortic stenosis, CAD s/p PCI/stent RCA, hypertension, hyperlipidemia, hypothyroidism, vocal cord paralysis following thyroidectomy,  CKD stage III, nephrolithiasis, urinary retention, PVD, anemia, osteoarthritis, DJD, breast cancer, and skin cancer who presented today with weakness.  HPI somewhat limited due to patient is hard of hearing.  She reports over the past few days she has been experiencing progressively worsening weakness.  She reports she became concerned that she may fall so she came to the emergency room for evaluation.  She admits to associated body aches that she has been taking over-the-counter medicine for.  She denies any fevers, chills, chest pain, shortness of breath, cough, abdominal pain, nausea, vomiting, diarrhea, or dysuria, changes in her chronic urinary incontinence.     In the ED lab work, EKG, and chest x-ray were performed. Labs revealed glucose 102, sodium 135 (chronically mildly low), bun 26 (chronic), , troponin 20, glucose 22, chronic anemia noted with red blood cell count 2.44, hemoglobin 10.5, and hematocrit 32.8, neutrophils 5.98, lymphocytes 0.74 UA revealed yellow hazy urine with moderate blood urobilinogen 2, large leuks.  Chest x-ray revealed no acute process.  EKG, per ER physician impression revealed normal sinus rhythm at a rate of 67 with nonspecific changes.  Vital signs were stable while in  "the ED.  She was given Rocephin and admitted to Dr. Vasquez.     10 point ROS negative except as noted above in HPI     Past medical history: As above  Past surgical history: Thyroidectomy, hysterectomy, PTCA/PCI with stent to RCA, AAA repair, cataract surgery  Social history: No history of smoking, alcohol abuse, or illicit drug use.  Lives with sister and nephew.  Ambulates with walker.  Family history: Father-stomach cancer; mother-myocardial infarction     Allergies  Pravastatin and Penicillins     Physical Exam  Constitutional:       Comments: Thin   HENT:      Head: Normocephalic and atraumatic.      Mouth/Throat:      Mouth: Mucous membranes are dry.   Eyes:      Conjunctiva/sclera: Conjunctivae normal.   Cardiovascular:      Rate and Rhythm: Normal rate and regular rhythm.      Heart sounds: Murmur heard.   Pulmonary:      Effort: Pulmonary effort is normal.      Breath sounds: No rales.   Abdominal:      General: Abdomen is flat. Bowel sounds are normal.      Palpations: Abdomen is soft.   Musculoskeletal:         General: Normal range of motion.      Cervical back: Neck supple.   Neurological:      Mental Status: She is alert. Mental status is at baseline.   Psychiatric:         Mood and Affect: Mood normal.            Last Recorded Vitals  Blood pressure (!) 155/97, pulse 77, temperature 36.5 °C (97.7 °F), temperature source Tympanic, resp. rate 25, height 1.727 m (5' 8\"), weight 51.3 kg (113 lb), SpO2 97 %.     Relevant Results     No current facility-administered medications on file prior to encounter.                  Current Outpatient Medications on File Prior to Encounter   Medication Sig Dispense Refill    amLODIPine (Norvasc) 10 mg tablet Take 1 tablet (10 mg) by mouth once daily.        anastrozole (Arimidex) 1 mg tablet Take 1 tablet (1 mg total) by mouth once daily.        aspirin 81 mg EC tablet Take 1 tablet (81 mg) by mouth once daily.        cholecalciferol (Vitamin D-3) 50 MCG (2000 UT) " tablet Take 1 tablet (2,000 Units) by mouth once daily.        cyanocobalamin (Vitamin B-12) 1,000 mcg tablet Take 1 tablet (1,000 mcg) by mouth once daily.        levothyroxine (Synthroid) 75 mcg tablet Take 1 tablet (75 mcg) by mouth once a day on Monday, Wednesday, and Friday.        atorvastatin (Lipitor) 20 mg tablet Take 1 tablet (20 mg) by mouth once daily.        levothyroxine (Synthroid, Levoxyl) 75 mcg tablet Take 0.5 tablets (37.5 mcg) by mouth once a day on Sunday, Tuesday, Thursday, and Saturday.                    Results for orders placed or performed during the hospital encounter of 11/26/23 (from the past 24 hour(s))   CBC and Auto Differential   Result Value Ref Range     WBC 7.6 4.4 - 11.3 x10*3/uL     nRBC 0.0 0.0 - 0.0 /100 WBCs     RBC 3.44 (L) 4.00 - 5.20 x10*6/uL     Hemoglobin 10.5 (L) 12.0 - 16.0 g/dL     Hematocrit 32.8 (L) 36.0 - 46.0 %     MCV 95 80 - 100 fL     MCH 30.5 26.0 - 34.0 pg     MCHC 32.0 32.0 - 36.0 g/dL     RDW 13.9 11.5 - 14.5 %     Platelets 277 150 - 450 x10*3/uL     Neutrophils % 78.5 40.0 - 80.0 %     Immature Granulocytes %, Automated 0.4 0.0 - 0.9 %     Lymphocytes % 9.7 13.0 - 44.0 %     Monocytes % 8.7 2.0 - 10.0 %     Eosinophils % 2.0 0.0 - 6.0 %     Basophils % 0.7 0.0 - 2.0 %     Neutrophils Absolute 5.98 (H) 1.60 - 5.50 x10*3/uL     Immature Granulocytes Absolute, Automated 0.03 0.00 - 0.50 x10*3/uL     Lymphocytes Absolute 0.74 (L) 0.80 - 3.00 x10*3/uL     Monocytes Absolute 0.66 0.05 - 0.80 x10*3/uL     Eosinophils Absolute 0.15 0.00 - 0.40 x10*3/uL     Basophils Absolute 0.05 0.00 - 0.10 x10*3/uL   Comprehensive metabolic panel   Result Value Ref Range     Glucose 102 (H) 74 - 99 mg/dL     Sodium 135 (L) 136 - 145 mmol/L     Potassium 4.5 3.5 - 5.3 mmol/L     Chloride 99 98 - 107 mmol/L     Bicarbonate 29 21 - 32 mmol/L     Anion Gap 12 10 - 20 mmol/L     Urea Nitrogen 26 (H) 6 - 23 mg/dL     Creatinine 0.91 0.50 - 1.05 mg/dL     eGFR 59 (L) >60  mL/min/1.73m*2     Calcium 9.3 8.6 - 10.3 mg/dL     Albumin 3.8 3.4 - 5.0 g/dL     Alkaline Phosphatase 73 33 - 136 U/L     Total Protein 7.3 6.4 - 8.2 g/dL     AST 20 9 - 39 U/L     Bilirubin, Total 0.4 0.0 - 1.2 mg/dL     ALT 11 7 - 45 U/L   Troponin I, High Sensitivity   Result Value Ref Range     Troponin I, High Sensitivity 20 (H) 0 - 13 ng/L   B-Type Natriuretic Peptide   Result Value Ref Range      (H) 0 - 99 pg/mL   Urinalysis with Reflex Microscopic and Culture   Result Value Ref Range     Color, Urine Yellow Straw, Yellow     Appearance, Urine Hazy (N) Clear     Specific Gravity, Urine 1.015 1.005 - 1.035     pH, Urine 5.0 5.0, 5.5, 6.0, 6.5, 7.0, 7.5, 8.0     Protein, Urine NEGATIVE NEGATIVE mg/dL     Glucose, Urine NEGATIVE NEGATIVE mg/dL     Blood, Urine MODERATE (2+) (A) NEGATIVE     Ketones, Urine NEGATIVE NEGATIVE mg/dL     Bilirubin, Urine NEGATIVE NEGATIVE     Urobilinogen, Urine 2.0 (N) <2.0 mg/dL     Nitrite, Urine NEGATIVE NEGATIVE     Leukocyte Esterase, Urine LARGE (3+) (A) NEGATIVE   Extra Urine Gray Tube   Result Value Ref Range     Extra Tube Hold for add-ons.     Microscopic Only, Urine   Result Value Ref Range     WBC, Urine 21-50 (A) 1-5, NONE /HPF     RBC, Urine 3-5 NONE, 1-2, 3-5 /HPF     Squamous Epithelial Cells, Urine 1-9 (SPARSE) Reference range not established. /HPF     Bacteria, Urine 1+ (A) NONE SEEN /HPF         XR chest 1 view     Result Date: 11/26/2023  Interpreted By:  Prosper Olivas, STUDY: XR CHEST 1 VIEW;  11/26/2023 10:54 am   INDICATION: Signs/Symptoms:Weakness.   COMPARISON: Portable chest, 7 September 2023   ACCESSION NUMBER(S): [id number]   ORDERING CLINICIAN: NICKY SPENCER   TECHNIQUE: Single frontal view of the chest; Portable technique   FINDINGS:   The cardiomediastinal silhouette is unchanged   No consolidative lung opacity   No edema / failure   No large pleural effusion or demonstrable pneumothorax        NO ACUTE DISEASE IN THE CHEST   MACRO: None    Signed by: Prosper Olivas 11/26/2023 11:18 AM Dictation workstation:   [id number]          Assessment/Plan   Principal Problem:    UTI (urinary tract infection)  Body Aches   Elevated troponin  Weakness     Admit to [facility]  Observation  Continue rocephin  urine culture pending      CBC, BMP in am  PT/OT  Repeat troponin and EKG     Chronic conditions: Hypertension, hyperlipidemia, hypothyroidism, coronary artery disease, PVD, breast cancer     Continue home medications as listed above     DVT prophylaxis     SCDs  Lovenox           I spent 45 minutes in the professional and overall care of this patient.     Patient fully evaluated plan as above.  Harmony Estrada, APRN-CNP                  Revision History    Patient fully evaluated on November 27 and Decadron orally added for myalgias.  Await urine culture continue IV Rocephin.  Recheck labs in AM.  Lidoderm patch to sacrum for pain        Principal Problem:    UTI (urinary tract infection)  Active Problems:    Generalized weakness              Patient fully evaluated on November 28.  Still with significant sacral pain.  X-rays to be ordered.  Continue present medications transition to oral antibiotics and monitor.        Patient fully evaluated on November 29.  Awaiting final urine sensitivity.  Formerly and sacrum noted on plain x-ray and orthopedic consultation pain.  CAT scan ordered at that area.  Neurology consultation for tremors.     Patient fully evaluated on November 30.  Results of CAT scan are noted and patient started on IV antibiotics pending further infectious disease and orthopedic recommendations.  Recheck labs in AM.     Patient fully evaluated on December 1.  Continue IV antibiotics.  His await orthopedic input if necessary.  Case discussed with infectious disease.  Recheck labs in AM.     Patient fully evaluated on December 2.  Continue present IV antibiotics.  Primidone added for tremors.  Await final cultures.  Recheck labs in AM.     Patient  fully evaluated on December 3.  Tremors have improved with primidone.  Await final recommendations from infectious disease and ST antibiotics on discharge.  Patient planning on discharge home with home care.     Patient fully evaluated on December 4.  Continue present IV antibiotics.  Discussed with patient tomorrow regarding.  She wants to do a 6-week course of IV antibiotics.  Patient does have recurrent sacral pain.  Varghese Vasquez MD                         Discharge Meds     Your medication list        START taking these medications        Instructions Last Dose Given Next Dose Due   acetaminophen 325 mg tablet  Commonly known as: Tylenol      Take 2 tablets (650 mg) by mouth every 4 hours if needed for fever (temp greater than 38.0 C) (greater than or equal to 38 C).       enoxaparin 30 mg/0.3 mL syringe  Commonly known as: Lovenox  Start taking on: December 6, 2023      Inject 0.3 mL (30 mg) under the skin once every 24 hours. Do not start before December 6, 2023.       lactobacillus acidophilus tablet tablet      Take 1 tablet by mouth 2 times a day.       meropenem 1 gram/100 mL IV  Commonly known as: Merrem      Infuse 100 mL (1,000 mg) at 200 mL/hr over 30 minutes into a venous catheter every 12 hours.       primidone 50 mg tablet  Commonly known as: Mysoline      Take 1 tablet (50 mg) by mouth 3 times a day.       vancomycin in dextrose 5 % IVPB  Commonly known as: Vancocin  Start taking on: December 6, 2023      Infuse 200 mL (1 g) at 200 mL/hr over 60 minutes into a venous catheter once every 24 hours. Do not start before December 6, 2023.              CONTINUE taking these medications        Instructions Last Dose Given Next Dose Due   amLODIPine 10 mg tablet  Commonly known as: Norvasc           anastrozole 1 mg tablet  Commonly known as: Arimidex           aspirin 81 mg EC tablet           atorvastatin 20 mg tablet  Commonly known as: Lipitor           cholecalciferol 50 MCG (2000 UT)  tablet  Commonly known as: Vitamin D-3           cyanocobalamin 1,000 mcg tablet  Commonly known as: Vitamin B-12           levothyroxine 75 mcg tablet  Commonly known as: Synthroid, Levoxyl           Synthroid 75 mcg tablet  Generic drug: levothyroxine                     Where to Get Your Medications        These medications were sent to [facility] [address] [place] [address] [address] [facility]  [address] [facility] [address]      Phone: [phone]   acetaminophen 325 mg tablet       Information about where to get these medications is not yet available    Ask your nurse or doctor about these medications  enoxaparin 30 mg/0.3 mL syringe  lactobacillus acidophilus tablet tablet  meropenem 1 gram/100 mL IV  primidone 50 mg tablet  vancomycin in dextrose 5 % IVPB         Test Results Pending At Discharge  Pending Labs       No current pending labs.            Hospital Course   Patient fully evaluated on December 5.  Clinically improved.  Will discharge to a skilled nursing facility on IV antibiotics.    Pertinent Physical Exam At Time of Discharge  Physical Exam    Outpatient Follow-Up  Future Appointments   Date Time Provider Department [place]   2/14/2024 10:00 AM [facility]  [facility]   2/21/2024 10:30 AM Tim Mancilla MD [id number] [place]     Patient fully evaluated on December 5.  Clinically improved.  Discharge on IV antibiotics for 6 weeks per recommendation of infectious disease.    Varghese Vasuqez MD

## 2023-12-06 ENCOUNTER — APPOINTMENT (OUTPATIENT)
Dept: RADIOLOGY | Facility: HOSPITAL | Age: 88
DRG: 689 | End: 2023-12-06
Payer: MEDICARE

## 2023-12-06 PROCEDURE — 2500000001 HC RX 250 WO HCPCS SELF ADMINISTERED DRUGS (ALT 637 FOR MEDICARE OP): Performed by: NURSE PRACTITIONER

## 2023-12-06 PROCEDURE — 2780000003 HC OR 278 NO HCPCS

## 2023-12-06 PROCEDURE — 76937 US GUIDE VASCULAR ACCESS: CPT

## 2023-12-06 PROCEDURE — 96372 THER/PROPH/DIAG INJ SC/IM: CPT | Performed by: NURSE PRACTITIONER

## 2023-12-06 PROCEDURE — 2500000004 HC RX 250 GENERAL PHARMACY W/ HCPCS (ALT 636 FOR OP/ED): Performed by: NURSE PRACTITIONER

## 2023-12-06 PROCEDURE — 36573 INSJ PICC RS&I 5 YR+: CPT | Mod: GC | Performed by: RADIOLOGY

## 2023-12-06 PROCEDURE — 1100000001 HC PRIVATE ROOM DAILY

## 2023-12-06 PROCEDURE — 2500000004 HC RX 250 GENERAL PHARMACY W/ HCPCS (ALT 636 FOR OP/ED): Performed by: INTERNAL MEDICINE

## 2023-12-06 PROCEDURE — C1751 CATH, INF, PER/CENT/MIDLINE: HCPCS

## 2023-12-06 PROCEDURE — 2500000005 HC RX 250 GENERAL PHARMACY W/O HCPCS: Performed by: INTERNAL MEDICINE

## 2023-12-06 RX ORDER — MEROPENEM 1 G/1
1 INJECTION, POWDER, FOR SOLUTION INTRAVENOUS EVERY 12 HOURS
Qty: 1200 ML | Refills: 1 | Status: SHIPPED | OUTPATIENT
Start: 2023-12-06 | End: 2024-01-09

## 2023-12-06 RX ADMIN — CYANOCOBALAMIN TAB 1000 MCG 1000 MCG: 1000 TAB at 08:49

## 2023-12-06 RX ADMIN — Medication 1 TABLET: at 08:49

## 2023-12-06 RX ADMIN — CHOLECALCIFEROL TAB 25 MCG (1000 UNIT) 2000 UNITS: 25 TAB at 08:49

## 2023-12-06 RX ADMIN — LEVOTHYROXINE SODIUM 75 MCG: 0.07 TABLET ORAL at 14:26

## 2023-12-06 RX ADMIN — ANASTROZOLE 1 MG: 1 TABLET, COATED ORAL at 08:48

## 2023-12-06 RX ADMIN — ATORVASTATIN CALCIUM 20 MG: 20 TABLET, FILM COATED ORAL at 08:50

## 2023-12-06 RX ADMIN — DEXAMETHASONE 6 MG: 6 TABLET ORAL at 08:49

## 2023-12-06 RX ADMIN — Medication 1 TABLET: at 20:16

## 2023-12-06 RX ADMIN — PRIMIDONE 75 MG: 50 TABLET ORAL at 08:48

## 2023-12-06 RX ADMIN — VANCOMYCIN HYDROCHLORIDE 1250 MG: 1.25 INJECTION, POWDER, LYOPHILIZED, FOR SOLUTION INTRAVENOUS at 13:18

## 2023-12-06 RX ADMIN — MEROPENEM 1000 MG: 1 INJECTION, POWDER, FOR SOLUTION INTRAVENOUS at 10:41

## 2023-12-06 RX ADMIN — ASPIRIN 81 MG: 81 TABLET, COATED ORAL at 08:49

## 2023-12-06 RX ADMIN — PRIMIDONE 75 MG: 50 TABLET ORAL at 20:15

## 2023-12-06 RX ADMIN — MEROPENEM 1000 MG: 1 INJECTION, POWDER, FOR SOLUTION INTRAVENOUS at 20:16

## 2023-12-06 RX ADMIN — ENOXAPARIN SODIUM 30 MG: 30 INJECTION SUBCUTANEOUS at 14:26

## 2023-12-06 RX ADMIN — LIDOCAINE 1 PATCH: 4 PATCH TOPICAL at 08:47

## 2023-12-06 RX ADMIN — POLYETHYLENE GLYCOL 3350 17 G: 17 POWDER, FOR SOLUTION ORAL at 08:48

## 2023-12-06 RX ADMIN — PRIMIDONE 75 MG: 50 TABLET ORAL at 14:25

## 2023-12-06 RX ADMIN — AMLODIPINE BESYLATE 10 MG: 10 TABLET ORAL at 08:49

## 2023-12-06 ASSESSMENT — COGNITIVE AND FUNCTIONAL STATUS - GENERAL
DAILY ACTIVITIY SCORE: 19
TURNING FROM BACK TO SIDE WHILE IN FLAT BAD: A LOT
TOILETING: A LITTLE
MOVING TO AND FROM BED TO CHAIR: TOTAL
MOBILITY SCORE: 9
STANDING UP FROM CHAIR USING ARMS: A LOT
STANDING UP FROM CHAIR USING ARMS: TOTAL
DRESSING REGULAR LOWER BODY CLOTHING: A LITTLE
DAILY ACTIVITIY SCORE: 19
DRESSING REGULAR UPPER BODY CLOTHING: A LITTLE
DRESSING REGULAR LOWER BODY CLOTHING: A LITTLE
HELP NEEDED FOR BATHING: A LITTLE
MOVING TO AND FROM BED TO CHAIR: A LOT
CLIMB 3 TO 5 STEPS WITH RAILING: TOTAL
TOILETING: A LITTLE
MOVING FROM LYING ON BACK TO SITTING ON SIDE OF FLAT BED WITH BEDRAILS: A LITTLE
WALKING IN HOSPITAL ROOM: TOTAL
MOVING FROM LYING ON BACK TO SITTING ON SIDE OF FLAT BED WITH BEDRAILS: A LITTLE
HELP NEEDED FOR BATHING: A LITTLE
TURNING FROM BACK TO SIDE WHILE IN FLAT BAD: A LOT
PERSONAL GROOMING: A LITTLE
DRESSING REGULAR UPPER BODY CLOTHING: A LITTLE
PERSONAL GROOMING: A LITTLE
WALKING IN HOSPITAL ROOM: A LOT
MOBILITY SCORE: 12
CLIMB 3 TO 5 STEPS WITH RAILING: TOTAL

## 2023-12-06 ASSESSMENT — PAIN SCALES - GENERAL
PAINLEVEL_OUTOF10: 0 - NO PAIN

## 2023-12-06 ASSESSMENT — PAIN - FUNCTIONAL ASSESSMENT
PAIN_FUNCTIONAL_ASSESSMENT: 0-10
PAIN_FUNCTIONAL_ASSESSMENT: 0-10

## 2023-12-06 NOTE — POST-PROCEDURE NOTE
Interventional Radiology Brief Postprocedure Note    Attending: Piter Conner MD     Assistant: none    Diagnosis: need for IV abx    Description of procedure: Successful placement of a peripherally inserted central venous catheter (PICC) via the right basilic vein and trimmed to 41 cm. The catheter tip position was confirmed with fluoroscopy and the catheter is ready to use.          Anesthesia:  Local    Complications: None    Estimated Blood Loss: minimal    Medications  As of 12/06/23 1644      cefTRIAXone (Rocephin) IVPB 1 g (mL/hr) Total volume:  Not documented* Dosing weight:  51.3   *Total volume has not been documented. View each administration to see the amount administered.     Date/Time Rate/Dose/Volume Action       11/26/23  1315 1 g - 100 mL/hr (over 30 min) New Bag      1345  (over 30 min) Stopped               amLODIPine (Norvasc) tablet 10 mg (mg) Total dose:  110 mg Dosing weight:  51.3      Date/Time Rate/Dose/Volume Action       11/26/23  1704 10 mg Given     11/27/23  0923 10 mg Given     11/28/23  1118 10 mg Given     11/29/23  0949 10 mg Given     11/30/23  0928 10 mg Given     12/01/23  0931 10 mg Given     12/02/23  0827 10 mg Given     12/03/23  0923 10 mg Given     12/04/23  0852 10 mg Given     12/05/23  0930 10 mg Given     12/06/23  0849 10 mg Given               anastrozole (Arimidex) tablet 1 mg (mg) Total dose:  11 mg Dosing weight:  51.3      Date/Time Rate/Dose/Volume Action       11/26/23  1652 1 mg Given     11/27/23  1258 1 mg Given     11/28/23  1120 1 mg Given     11/29/23  0950 1 mg Given     11/30/23  0928 1 mg Given     12/01/23  0931 1 mg Given     12/02/23  0832 1 mg Given     12/03/23  0925 1 mg Given     12/04/23  0852 1 mg Given     12/05/23  0930 1 mg Given     12/06/23  0848 1 mg Given               aspirin EC tablet 81 mg (mg) Total dose:  891 mg Dosing weight:  51.3      Date/Time Rate/Dose/Volume Action       11/26/23  1455 81 mg Given     11/27/23  0923 81 mg  Given     11/28/23  1118 81 mg Given     11/29/23  0949 81 mg Given     11/30/23  0928 81 mg Given     12/01/23  0931 81 mg Given     12/02/23  0827 81 mg Given     12/03/23  0925 81 mg Given     12/04/23  0852 81 mg Given     12/05/23  0930 81 mg Given     12/06/23  0849 81 mg Given               atorvastatin (Lipitor) tablet 20 mg (mg) Total dose:  220 mg Dosing weight:  51.3      Date/Time Rate/Dose/Volume Action       11/26/23  1651 20 mg Given     11/27/23 0923 20 mg Given     11/28/23  1120 20 mg Given     11/29/23  0949 20 mg Given     11/30/23  0928 20 mg Given     12/01/23 0931 20 mg Given     12/02/23  0827 20 mg Given     12/03/23  0924 20 mg Given     12/04/23  0852 20 mg Given     12/05/23  0931 20 mg Given     12/06/23  0850 20 mg Given               cholecalciferol (Vitamin D-3) tablet 2,000 Units (Units) Total dose:  22,000 Units Dosing weight:  51.3      Date/Time Rate/Dose/Volume Action       11/26/23  1644 2,000 Units Given     11/27/23 0923 2,000 Units Given     11/28/23  1118 2,000 Units Given     11/29/23  0949 2,000 Units Given     11/30/23  0928 2,000 Units Given     12/01/23  0931 2,000 Units Given     12/02/23  0827 2,000 Units Given     12/03/23  0925 2,000 Units Given     12/04/23  0852 2,000 Units Given     12/05/23  0929 2,000 Units Given     12/06/23  0849 2,000 Units Given               cyanocobalamin (Vitamin B-12) tablet 1,000 mcg (mcg) Total dose:  11,000 mcg Dosing weight:  51.3      Date/Time Rate/Dose/Volume Action       11/26/23  1651 1,000 mcg Given     11/27/23 0923 1,000 mcg Given     11/28/23  1118 1,000 mcg Given     11/29/23  0949 1,000 mcg Given     11/30/23  0928 1,000 mcg Given     12/01/23 0931 1,000 mcg Given     12/02/23  0827 1,000 mcg Given     12/03/23  0925 1,000 mcg Given     12/04/23  0852 1,000 mcg Given     12/05/23  0931 1,000 mcg Given     12/06/23  0849 1,000 mcg Given               levothyroxine (Synthroid, Levoxyl) tablet 75 mcg (mcg) Total dose:   375 mcg Dosing weight:  51.3      Date/Time Rate/Dose/Volume Action       11/27/23  1344 75 mcg Given     11/29/23  1448 75 mcg Given     12/01/23  1449 75 mcg Given     12/04/23  1421 75 mcg Given     12/06/23  1426 75 mcg Given               levothyroxine (Synthroid, Levoxyl) tablet 37.5 mcg (mcg) Total dose:  150 mcg* Dosing weight:  51.3   *Administration not included in total     Date/Time Rate/Dose/Volume Action       11/26/23  1455 *37.5 mcg Missed     11/28/23  1455 *37.5 mcg Missed     11/30/23  1446 37.5 mcg Given     12/02/23  1446 37.5 mcg Given     12/03/23  1515 37.5 mcg Given     12/05/23  1446 37.5 mcg Given               enoxaparin (Lovenox) syringe 30 mg (mg) Total dose:  330 mg Dosing weight:  51.3      Date/Time Rate/Dose/Volume Action       11/26/23  1657 30 mg Given     11/27/23  1355 30 mg Given     11/28/23  1442 30 mg Given     11/29/23  1448 30 mg Given     11/30/23  1446 30 mg Given     12/01/23  1449 30 mg Given     12/02/23  1445 30 mg Given     12/03/23  1515 30 mg Given     12/04/23  1421 30 mg Given     12/05/23  1447 30 mg Given     12/06/23  1426 30 mg Given               acetaminophen (Tylenol) tablet 650 mg (mg) Total dose:  1,950 mg Dosing weight:  51.3      Date/Time Rate/Dose/Volume Action       11/26/23  1649 650 mg Given      2119 *Not included in total See Alternative     11/27/23  0445 *Not included in total See Alternative     11/29/23  2018 *Not included in total See Alternative     12/01/23  2107 650 mg Given     12/04/23  0600 *Not included in total See Alternative     12/05/23  2129 650 mg Given               acetaminophen (Tylenol) oral liquid 650 mg (mg) Total dose:  Cannot be calculated* Dosing weight:  51.3   *Administration dose not documented     Date/Time Rate/Dose/Volume Action       12/01/23 2107 *Not included in total See Alternative               acetaminophen (Tylenol) oral liquid 650 mg (mg) Total dose:  1,950 mg* Dosing weight:  51.3   *Administration  not included in total     Date/Time Rate/Dose/Volume Action       11/26/23 1649 *Not included in total See Alternative      2119 650 mg Given     11/27/23 0445 650 mg Given     11/29/23 2018 *650 mg Missed     12/04/23 0600 650 mg Given     12/05/23 2129 *Not included in total See Alternative               acetaminophen (Tylenol) suppository 650 mg (mg) Total dose:  Cannot be calculated* Dosing weight:  51.3   *Administration dose not documented     Date/Time Rate/Dose/Volume Action       12/01/23 2107 *Not included in total See Alternative               acetaminophen (Tylenol) suppository 650 mg (mg) Total dose:  Cannot be calculated* Dosing weight:  51.3   *Administration dose not documented     Date/Time Rate/Dose/Volume Action       11/26/23 1649 *Not included in total See Alternative      2119 *Not included in total See Alternative     11/27/23 0445 *Not included in total See Alternative     11/29/23 2018 *Not included in total See Alternative     12/04/23 0600 *Not included in total See Alternative     12/05/23 2129 *Not included in total See Alternative               polyethylene glycol (Glycolax, Miralax) packet 17 g (g) Total dose:  187 g Dosing weight:  51.3      Date/Time Rate/Dose/Volume Action       11/26/23  1701 17 g Given     11/27/23  0922 17 g Given     11/28/23  0900 17 g Given     11/29/23  0950 17 g Given     11/30/23  0928 17 g Given     12/01/23  0931 17 g Given     12/02/23  0826 17 g Given     12/03/23  0940 17 g Given     12/04/23  0852 17 g Given     12/05/23  0930 17 g Given     12/06/23  0848 17 g Given               cefTRIAXone (Rocephin) 2 g IV in dextrose 5% 50 mL (mL/hr) Total volume:  Not documented* Dosing weight:  51.3   *Total volume has not been documented. View each administration to see the amount administered.     Date/Time Rate/Dose/Volume Action       11/26/23  1455 *2 g - 100 mL/hr (over 30 min) Missed     11/27/23  1258 2 g - 100 mL/hr (over 30 min) New Bag       1328  (over 30 min) Stopped               lactobacillus acidophilus tablet 1 tablet (tablet) Total dose:  21 tablet Dosing weight:  51.3      Date/Time Rate/Dose/Volume Action       11/26/23  1646 1 tablet Given      2111 1 tablet Given     11/27/23  0923 1 tablet Given      2053 1 tablet Given     11/28/23  1117 1 tablet Given      2038 1 tablet Given     11/29/23  0950 1 tablet Given      2023 1 tablet Given     11/30/23  0928 1 tablet Given      2049 1 tablet Given     12/01/23  0900 1 tablet Given      2107 1 tablet Given     12/02/23  0826 1 tablet Given      2107 1 tablet Given     12/03/23  0924 1 tablet Given      2042 1 tablet Given     12/04/23  0852 1 tablet Given      2037 1 tablet Given     12/05/23  0930 1 tablet Given      2128 1 tablet Given     12/06/23  0849 1 tablet Given               lidocaine 4 % patch 1 patch (patch) Total dose:  10 patch Dosing weight:  51.3      Date/Time Rate/Dose/Volume Action       11/27/23  1622 1 patch (over 720 min) Medication Applied     11/28/23  0422  (over 720 min) Medication Removed      1117 1 patch (over 720 min) Medication Applied      2317  (over 720 min) Medication Removed     11/29/23  0949 1 patch (over 720 min) Medication Applied      2149  (over 720 min) Medication Removed     11/30/23  0927 1 patch (over 720 min) Medication Applied      2127  (over 720 min) Medication Removed     12/01/23  0931 1 patch (over 720 min) Medication Applied      2131  (over 720 min) Medication Removed     12/02/23  0827 1 patch (over 720 min) Medication Applied      2027  (over 720 min) Medication Removed     12/03/23  0923 1 patch (over 720 min) Medication Applied      2123  (over 720 min) Medication Removed     12/04/23  0851 1 patch (over 720 min) Medication Applied      2051  (over 720 min) Medication Removed     12/05/23  0930 1 patch (over 720 min) Medication Applied      2130  (over 720 min) Medication Removed     12/06/23  0847 1 patch (over 720 min) Medication  Applied               dexAMETHasone (Decadron) tablet 6 mg (mg) Total dose:  60 mg Dosing weight:  51.3      Date/Time Rate/Dose/Volume Action       11/27/23  1622 6 mg Given     11/28/23  1119 6 mg Given     11/29/23  0949 6 mg Given     11/30/23  0928 6 mg Given     12/01/23  0931 6 mg Given     12/02/23  0827 6 mg Given     12/03/23  0924 6 mg Given     12/04/23  0852 6 mg Given     12/05/23  0929 6 mg Given     12/06/23  0849 6 mg Given               cefuroxime (Ceftin) tablet 250 mg (mg) Total dose:  750 mg Dosing weight:  51.3      Date/Time Rate/Dose/Volume Action       11/28/23  1744 250 mg Given      2038 250 mg Given     11/29/23  0950 250 mg Given               sulfamethoxazole-trimethoprim (Bactrim DS) 800-160 mg per tablet 320 mg (mg) Total dose:  640 mg Dosing weight:  51.3      Date/Time Rate/Dose/Volume Action       11/29/23 2023 320 mg Given     11/30/23  0928 320 mg Given               vancomycin in dextrose 5 % (Vancocin) IVPB 1 g (mL/hr) Total dose:  1 g* Dosing weight:  51.3   *From user-documented volume     Date/Time Rate/Dose/Volume Action       11/30/23  2100 1 g - 200 mL/hr (over 60 min) New Bag      2200 200 mL Stopped               vancomycin in dextrose 5 % (Vancocin) IVPB 1,000 mg (mL/hr) Total volume:  Not documented* Dosing weight:  51.3   *Total volume has not been documented. View each administration to see the amount administered.     Date/Time Rate/Dose/Volume Action       12/04/23  1421 1,000 mg - 200 mL/hr (over 60 min) New Bag      1521  (over 60 min) Stopped     12/05/23  1447 1,000 mg - 200 mL/hr (over 60 min) New Bag      1547  (over 60 min) Stopped               meropenem (Merrem) in sodium chloride 0.9 % 100 mL IV 1,000 mg (mL/hr) Total dose:  2,000 mg* Dosing weight:  51.3   *From user-documented volume     Date/Time Rate/Dose/Volume Action       12/01/23  1306 1,000 mg - 200 mL/hr (over 30 min) New Bag      1336  (over 30 min) Stopped      2230 1,000 mg - 200 mL/hr  (over 30 min) New Bag      2300  (over 30 min) Stopped     12/02/23  1009 1,000 mg - 200 mL/hr (over 30 min) New Bag      1039  (over 30 min) Stopped      2224 1,000 mg - 200 mL/hr (over 30 min) New Bag      2254 100 mL Stopped     12/03/23  0930 1,000 mg - 200 mL/hr (over 30 min) New Bag      1000  (over 30 min) Stopped      2143 1,000 mg - 200 mL/hr (over 30 min) New Bag      2213 100 mL Stopped     12/04/23  1044 1,000 mg - 200 mL/hr (over 30 min) New Bag      1114  (over 30 min) Stopped      2036 1,000 mg - 200 mL/hr (over 30 min) New Bag      2106  (over 30 min) Stopped     12/05/23  0959 1,000 mg - 200 mL/hr (over 30 min) New Bag      1029  (over 30 min) Stopped      2128 1,000 mg - 200 mL/hr (over 30 min) New Bag      2158  (over 30 min) Stopped     12/06/23  1041 1,000 mg - 200 mL/hr (over 30 min) New Bag      1111  (over 30 min) Stopped               vancomycin in dextrose 5 % (Vancocin) IVPB 750 mg (mL/hr) Total volume:  Not documented* Dosing weight:  51.3   *Total volume has not been documented. View each administration to see the amount administered.     Date/Time Rate/Dose/Volume Action       12/01/23  2107 750 mg - 200 mL/hr (over 45 min) New Bag      2152  (over 45 min) Stopped     12/02/23  1445 750 mg - 200 mL/hr (over 45 min) New Bag      1530  (over 45 min) Stopped     12/03/23  1515 750 mg - 200 mL/hr (over 45 min) New Bag      1600  (over 45 min) Stopped               primidone (Mysoline) tablet 75 mg (mg) Total dose:  975 mg Dosing weight:  51.3      Date/Time Rate/Dose/Volume Action       12/02/23  1720 75 mg Given      2107 75 mg Given     12/03/23  0924 75 mg Given      1515 75 mg Given      2042 75 mg Given     12/04/23  0852 75 mg Given      1627 75 mg Given      2037 75 mg Given     12/05/23  0900 75 mg Given      1446 75 mg Given      2127 75 mg Given     12/06/23  0848 75 mg Given      1425 75 mg Given               vancomycin (Vancocin) in dextrose 5 % water (D5W) 250 mL IV 1,250 mg  (mL/hr) Total volume:  Not documented* Dosing weight:  51.3   *Total volume has not been documented. View each administration to see the amount administered.     Date/Time Rate/Dose/Volume Action       12/06/23  1318 1,250 mg - 200 mL/hr (over 75 min) New Bag      1433  (over 75 min) Stopped                   No specimens collected      See detailed result report with images in PACS.    The patient tolerated the procedure well without incident or complication and is in stable condition.

## 2023-12-06 NOTE — CARE PLAN
The patient's goals for the shift include  discharge today     The clinical goals for the shift include Patient will be free from falls throughout the shift

## 2023-12-06 NOTE — DISCHARGE SUMMARY
Discharge Diagnosis  UTI (urinary tract infection)    Issues Requiring Follow-Up          Debbie Flores MD  Physician  Internal Medicine     Progress Notes      Signed     Date of Service: 12/4/2023  3:16 PM     Signed       Expand All Collapse All    Regi Leger is a 89 y.o. female on day 6 of admission presenting with UTI (urinary tract infection).           Subjective   Patient fully evaluated on November 27.  Improved but still very weak and probably will require skilled nursing.              Objective   Last Recorded Vitals  /61   Pulse 64   Temp 36.7 °C (98.1 °F) (Temporal)   Resp 16   Wt 51.3 kg (113 lb 1.5 oz)   SpO2 95%   Intake/Output last 3 Shifts:     Intake/Output Summary (Last 24 hours) at 12/4/2023 1516  Last data filed at 12/4/2023 0600      Gross per 24 hour   Intake 440 ml   Output 1000 ml   Net -560 ml            Admission Weight  Weight: 51.3 kg (113 lb) (11/26/23 0916)     Daily Weight  11/30/23 : 51.3 kg (113 lb 1.5 oz)     Image Results  CT abdomen pelvis wo IV contrast  Narrative: Interpreted By:  Darryl Osborn,   STUDY:  CT ABDOMEN PELVIS WO IV CONTRAST;  11/30/2023 3:57 am      INDICATION:  Signs/Symptoms:sacral pain.      COMPARISON:  08/15/2022      ACCESSION NUMBER(S):  [id number]      ORDERING CLINICIAN:  DEBBIE FLORES      TECHNIQUE:  CT of the abdomen and pelvis was performed. Contiguous axial images  were obtained at 3 mm slice thickness through the abdomen and pelvis.  Coronal and sagittal reconstructions at 3 mm slice thickness were  performed.  Intravenous or oral contrast was not administered.      FINDINGS:  LOWER CHEST:  Small bilateral pleural effusions with compressive atelectasis.  Radiodensity at the right coronary artery would indicate  atherosclerotic calcification or stent.      ABDOMEN:      LIVER:  There is increased attenuation of the liver indicating hemosiderosis  or possibly amiodarone therapy. The dome of the liver was not  included, the patient  was not recalled for additional imaging at this  time due to COVID precautions. If there is clinical concern of  hepatic pathology additional imaging possible could be obtained if  requested. The hepatic parenchyma otherwise is homogeneous. The  hepatic size is normal.      SPLEEN:  The spleen is normal in size and homogeneous.      ADRENAL GLANDS:  Bilateral adrenal glands appear normal.      KIDNEYS AND URETERS:  There is moderate left renal cortical atrophy, measuring 7.8 cm in  length. There are several left renal cortical cysts. The right renal  cortex is maintained.      There are several nonobstructing intrarenal calculi, the largest  measuring 8 mm at the right interpole. The distal right ureteral  course is poorly visualized due to crowded overlying bowel loops.  Otherwise no identified urinary tract dilatation or radiodense  calculi.      PANCREAS:  Again as previously there are multiple hypodensities, the largest  measuring 2.1 cm at the neck of relative fluid attenuation indicating  cyst or cystic/mucinous neoplasm. No ductal dilatation.      GALLBLADDER:  No radiodense calculi, wall thickening or pericholecystic fluid.      BILE DUCTS:  There is no biliary dilatation or filling defects.          VESSELS:  Again there is an aortic-bi iliac endovascular stent extending  through a bimodal abdominal aortic aneurysm. The latter measures up  to 3.9 cm at the L3 level, with more saccular dilatation measuring  4.7 cm at the bifurcation, similar to the prior exam.      PERITONEUM AND RETROPERITONEUM:  Trace ascites is seen at the pelvis. No free intraperitoneal air.      The retroperitoneum appears unremarkable, and without significant  adenopathy.      No enlarged mesenteric lymph nodes.      BOWEL:  Fecal residue seen throughout the colon which is mildly dilated,  greatest at the rectosigmoid colon measuring 6.6 cm in diameter. This  is most likely due to constipation probably early impaction. There is  mild  reticulation of the presacral space probably from dependent  edema. The stomach and small bowel segments appear unremarkable.      PELVIS:      BLADDER:  The urinary bladder contour is smooth.      REPRODUCTIVE ORGANS:  Status post hysterectomy.          BONE, ABDOMINAL WALL AND OTHER FINDINGS:  Of note is sclerosis of the S5 vertebral body not evident on the  previous exam. This is nonspecific, but may be due to chronic  osteomyelitis as there is a left paramedian subcutaneous/soft tissue  fistula tract from approximately this level to the tip of the coccyx  is slightly more inferiorly to the inferior margin of the gluteal  fold best seen on image numbers 108 through 132 of series 201.  associated subcutaneous reticulation extends more superiorly to the  S3 level. However, as the fistula tract itself is not appear to  contact the S5 vertebral body, sclerosis due to developing stress  fracture is not excluded. A sclerotic metastatic lesion is considered  unlikely as there are no other bony sclerotic foci, but not  completely excluded. No evidence of additional osteomyelitis  including the coccyx. However if more definitive exclusion of such is  needed correlation with MRI and radionuclide imaging as warranted  would be recommended.      The abdominal wall soft tissues appear normal.      Impression: 1.  Sclerosis of the S5 vertebral body, suspicious for chronic  osteomyelitis as there is a locoregional superficial subcutaneous  fistula tract as described.  2. Increased attenuation of the hepatic parenchyma, probably due to  hemosiderosis, or can occur with amiodarone therapy. The dome of the  liver was not included.  3. Trace ascites.  4. Probable constipation and possibly impaction.  5. Redemonstration of pancreatic cyst or cystic/mucinous neoplasms.  6. Moderate left renal cortical atrophy with bilateral nonobstructing  intrarenal calculi.  7. Stable abdominal aortic aneurysms with endovascular stents.       MACRO:  1. None      Signed by: Darryl Osborn 11/30/2023 9:13 AM  Dictation workstation:   [id number]        Physical Exam     Relevant Results                       Assessment/Plan                  Varghese Vasquez MD  Physician  Internal Medicine     H&P      Addendum     Date of Service: 11/26/2023  2:46 PM      Addendum        Expand All Collapse All    History Of Present Illness  Regi Leger is a 89 y.o. female with past medical history of abdominal aortic aneurysm s/p repair (01/2022), endoleak of aortic graft (no intervention pursued per patient's wishes), severe aortic stenosis, CAD s/p PCI/stent RCA, hypertension, hyperlipidemia, hypothyroidism, vocal cord paralysis following thyroidectomy,  CKD stage III, nephrolithiasis, urinary retention, PVD, anemia, osteoarthritis, DJD, breast cancer, and skin cancer who presented today with weakness.  HPI somewhat limited due to patient is hard of hearing.  She reports over the past few days she has been experiencing progressively worsening weakness.  She reports she became concerned that she may fall so she came to the emergency room for evaluation.  She admits to associated body aches that she has been taking over-the-counter medicine for.  She denies any fevers, chills, chest pain, shortness of breath, cough, abdominal pain, nausea, vomiting, diarrhea, or dysuria, changes in her chronic urinary incontinence.     In the ED lab work, EKG, and chest x-ray were performed. Labs revealed glucose 102, sodium 135 (chronically mildly low), bun 26 (chronic), , troponin 20, glucose 22, chronic anemia noted with red blood cell count 2.44, hemoglobin 10.5, and hematocrit 32.8, neutrophils 5.98, lymphocytes 0.74 UA revealed yellow hazy urine with moderate blood urobilinogen 2, large leuks.  Chest x-ray revealed no acute process.  EKG, per ER physician impression revealed normal sinus rhythm at a rate of 67 with nonspecific changes.  Vital signs were stable while in  "the ED.  She was given Rocephin and admitted to Dr. Vasquez.     10 point ROS negative except as noted above in HPI     Past medical history: As above  Past surgical history: Thyroidectomy, hysterectomy, PTCA/PCI with stent to RCA, AAA repair, cataract surgery  Social history: No history of smoking, alcohol abuse, or illicit drug use.  Lives with sister and nephew.  Ambulates with walker.  Family history: Father-stomach cancer; mother-myocardial infarction     Allergies  Pravastatin and Penicillins     Physical Exam  Constitutional:       Comments: Thin   HENT:      Head: Normocephalic and atraumatic.      Mouth/Throat:      Mouth: Mucous membranes are dry.   Eyes:      Conjunctiva/sclera: Conjunctivae normal.   Cardiovascular:      Rate and Rhythm: Normal rate and regular rhythm.      Heart sounds: Murmur heard.   Pulmonary:      Effort: Pulmonary effort is normal.      Breath sounds: No rales.   Abdominal:      General: Abdomen is flat. Bowel sounds are normal.      Palpations: Abdomen is soft.   Musculoskeletal:         General: Normal range of motion.      Cervical back: Neck supple.   Neurological:      Mental Status: She is alert. Mental status is at baseline.   Psychiatric:         Mood and Affect: Mood normal.            Last Recorded Vitals  Blood pressure (!) 155/97, pulse 77, temperature 36.5 °C (97.7 °F), temperature source Tympanic, resp. rate 25, height 1.727 m (5' 8\"), weight 51.3 kg (113 lb), SpO2 97 %.     Relevant Results     No current facility-administered medications on file prior to encounter.                  Current Outpatient Medications on File Prior to Encounter   Medication Sig Dispense Refill    amLODIPine (Norvasc) 10 mg tablet Take 1 tablet (10 mg) by mouth once daily.        anastrozole (Arimidex) 1 mg tablet Take 1 tablet (1 mg total) by mouth once daily.        aspirin 81 mg EC tablet Take 1 tablet (81 mg) by mouth once daily.        cholecalciferol (Vitamin D-3) 50 MCG (2000 UT) " tablet Take 1 tablet (2,000 Units) by mouth once daily.        cyanocobalamin (Vitamin B-12) 1,000 mcg tablet Take 1 tablet (1,000 mcg) by mouth once daily.        levothyroxine (Synthroid) 75 mcg tablet Take 1 tablet (75 mcg) by mouth once a day on Monday, Wednesday, and Friday.        atorvastatin (Lipitor) 20 mg tablet Take 1 tablet (20 mg) by mouth once daily.        levothyroxine (Synthroid, Levoxyl) 75 mcg tablet Take 0.5 tablets (37.5 mcg) by mouth once a day on Sunday, Tuesday, Thursday, and Saturday.                    Results for orders placed or performed during the hospital encounter of 11/26/23 (from the past 24 hour(s))   CBC and Auto Differential   Result Value Ref Range     WBC 7.6 4.4 - 11.3 x10*3/uL     nRBC 0.0 0.0 - 0.0 /100 WBCs     RBC 3.44 (L) 4.00 - 5.20 x10*6/uL     Hemoglobin 10.5 (L) 12.0 - 16.0 g/dL     Hematocrit 32.8 (L) 36.0 - 46.0 %     MCV 95 80 - 100 fL     MCH 30.5 26.0 - 34.0 pg     MCHC 32.0 32.0 - 36.0 g/dL     RDW 13.9 11.5 - 14.5 %     Platelets 277 150 - 450 x10*3/uL     Neutrophils % 78.5 40.0 - 80.0 %     Immature Granulocytes %, Automated 0.4 0.0 - 0.9 %     Lymphocytes % 9.7 13.0 - 44.0 %     Monocytes % 8.7 2.0 - 10.0 %     Eosinophils % 2.0 0.0 - 6.0 %     Basophils % 0.7 0.0 - 2.0 %     Neutrophils Absolute 5.98 (H) 1.60 - 5.50 x10*3/uL     Immature Granulocytes Absolute, Automated 0.03 0.00 - 0.50 x10*3/uL     Lymphocytes Absolute 0.74 (L) 0.80 - 3.00 x10*3/uL     Monocytes Absolute 0.66 0.05 - 0.80 x10*3/uL     Eosinophils Absolute 0.15 0.00 - 0.40 x10*3/uL     Basophils Absolute 0.05 0.00 - 0.10 x10*3/uL   Comprehensive metabolic panel   Result Value Ref Range     Glucose 102 (H) 74 - 99 mg/dL     Sodium 135 (L) 136 - 145 mmol/L     Potassium 4.5 3.5 - 5.3 mmol/L     Chloride 99 98 - 107 mmol/L     Bicarbonate 29 21 - 32 mmol/L     Anion Gap 12 10 - 20 mmol/L     Urea Nitrogen 26 (H) 6 - 23 mg/dL     Creatinine 0.91 0.50 - 1.05 mg/dL     eGFR 59 (L) >60  mL/min/1.73m*2     Calcium 9.3 8.6 - 10.3 mg/dL     Albumin 3.8 3.4 - 5.0 g/dL     Alkaline Phosphatase 73 33 - 136 U/L     Total Protein 7.3 6.4 - 8.2 g/dL     AST 20 9 - 39 U/L     Bilirubin, Total 0.4 0.0 - 1.2 mg/dL     ALT 11 7 - 45 U/L   Troponin I, High Sensitivity   Result Value Ref Range     Troponin I, High Sensitivity 20 (H) 0 - 13 ng/L   B-Type Natriuretic Peptide   Result Value Ref Range      (H) 0 - 99 pg/mL   Urinalysis with Reflex Microscopic and Culture   Result Value Ref Range     Color, Urine Yellow Straw, Yellow     Appearance, Urine Hazy (N) Clear     Specific Gravity, Urine 1.015 1.005 - 1.035     pH, Urine 5.0 5.0, 5.5, 6.0, 6.5, 7.0, 7.5, 8.0     Protein, Urine NEGATIVE NEGATIVE mg/dL     Glucose, Urine NEGATIVE NEGATIVE mg/dL     Blood, Urine MODERATE (2+) (A) NEGATIVE     Ketones, Urine NEGATIVE NEGATIVE mg/dL     Bilirubin, Urine NEGATIVE NEGATIVE     Urobilinogen, Urine 2.0 (N) <2.0 mg/dL     Nitrite, Urine NEGATIVE NEGATIVE     Leukocyte Esterase, Urine LARGE (3+) (A) NEGATIVE   Extra Urine Gray Tube   Result Value Ref Range     Extra Tube Hold for add-ons.     Microscopic Only, Urine   Result Value Ref Range     WBC, Urine 21-50 (A) 1-5, NONE /HPF     RBC, Urine 3-5 NONE, 1-2, 3-5 /HPF     Squamous Epithelial Cells, Urine 1-9 (SPARSE) Reference range not established. /HPF     Bacteria, Urine 1+ (A) NONE SEEN /HPF         XR chest 1 view     Result Date: 11/26/2023  Interpreted By:  Prosper Olivas, STUDY: XR CHEST 1 VIEW;  11/26/2023 10:54 am   INDICATION: Signs/Symptoms:Weakness.   COMPARISON: Portable chest, 7 September 2023   ACCESSION NUMBER(S): [id number]   ORDERING CLINICIAN: NICKY SPENCER   TECHNIQUE: Single frontal view of the chest; Portable technique   FINDINGS:   The cardiomediastinal silhouette is unchanged   No consolidative lung opacity   No edema / failure   No large pleural effusion or demonstrable pneumothorax        NO ACUTE DISEASE IN THE CHEST   MACRO: None    Signed by: Prosper Olivas 11/26/2023 11:18 AM Dictation workstation:   [id number]          Assessment/Plan   Principal Problem:    UTI (urinary tract infection)  Body Aches   Elevated troponin  Weakness     Admit to [facility]  Observation  Continue rocephin  urine culture pending      CBC, BMP in am  PT/OT  Repeat troponin and EKG     Chronic conditions: Hypertension, hyperlipidemia, hypothyroidism, coronary artery disease, PVD, breast cancer     Continue home medications as listed above     DVT prophylaxis     SCDs  Lovenox           I spent 45 minutes in the professional and overall care of this patient.     Patient fully evaluated plan as above.  Harmony Estrada, APRN-CNP                  Revision History    Patient fully evaluated on November 27 and Decadron orally added for myalgias.  Await urine culture continue IV Rocephin.  Recheck labs in AM.  Lidoderm patch to sacrum for pain        Principal Problem:    UTI (urinary tract infection)  Active Problems:    Generalized weakness              Patient fully evaluated on November 28.  Still with significant sacral pain.  X-rays to be ordered.  Continue present medications transition to oral antibiotics and monitor.        Patient fully evaluated on November 29.  Awaiting final urine sensitivity.  Formerly and sacrum noted on plain x-ray and orthopedic consultation pain.  CAT scan ordered at that area.  Neurology consultation for tremors.     Patient fully evaluated on November 30.  Results of CAT scan are noted and patient started on IV antibiotics pending further infectious disease and orthopedic recommendations.  Recheck labs in AM.     Patient fully evaluated on December 1.  Continue IV antibiotics.  His await orthopedic input if necessary.  Case discussed with infectious disease.  Recheck labs in AM.     Patient fully evaluated on December 2.  Continue present IV antibiotics.  Primidone added for tremors.  Await final cultures.  Recheck labs in AM.     Patient  fully evaluated on December 3.  Tremors have improved with primidone.  Await final recommendations from infectious disease and ST antibiotics on discharge.  Patient planning on discharge home with home care.     Patient fully evaluated on December 4.  Continue present IV antibiotics.  Discussed with patient tomorrow regarding.  She wants to do a 6-week course of IV antibiotics.  Patient does have recurrent sacral pain.  Varghese Vasquez MD                         Discharge Meds     Your medication list        START taking these medications        Instructions Last Dose Given Next Dose Due   acetaminophen 325 mg tablet  Commonly known as: Tylenol      Take 2 tablets (650 mg) by mouth every 4 hours if needed for fever (temp greater than 38.0 C) (greater than or equal to 38 C).       enoxaparin 30 mg/0.3 mL syringe  Commonly known as: Lovenox      Inject 0.3 mL (30 mg) under the skin once every 24 hours. Do not start before December 6, 2023.       lactobacillus acidophilus tablet tablet      Take 1 tablet by mouth 2 times a day.       meropenem 1 gram/100 mL IV  Commonly known as: Merrem      Infuse 100 mL (1,000 mg) at 200 mL/hr over 30 minutes into a venous catheter every 12 hours.       meropenem 1 gram injection  Commonly known as: Merrem      Infuse 20 mL (1 g) into a venous catheter every 12 hours. Q weekly labs - cbc, bmp, esr, crp; fax to [phone]       primidone 50 mg tablet  Commonly known as: Mysoline      Take 1 tablet (50 mg) by mouth 3 times a day.       vancomycin in dextrose 5 % IVPB  Commonly known as: Vancocin      Infuse 200 mL (1 g) at 200 mL/hr over 60 minutes into a venous catheter once every 24 hours. Do not start before December 6, 2023.              CONTINUE taking these medications        Instructions Last Dose Given Next Dose Due   amLODIPine 10 mg tablet  Commonly known as: Norvasc           anastrozole 1 mg tablet  Commonly known as: Arimidex           aspirin 81 mg EC tablet            atorvastatin 20 mg tablet  Commonly known as: Lipitor           cholecalciferol 50 MCG (2000 UT) tablet  Commonly known as: Vitamin D-3           cyanocobalamin 1,000 mcg tablet  Commonly known as: Vitamin B-12           levothyroxine 75 mcg tablet  Commonly known as: Synthroid, Levoxyl           Synthroid 75 mcg tablet  Generic drug: levothyroxine                     Where to Get Your Medications        These medications were sent to [facility] [address] [place] [address] [address] [facility]  [address] [facility] [address]      Phone: [phone]   acetaminophen 325 mg tablet       You can get these medications from any pharmacy    Bring a paper prescription for each of these medications  meropenem 1 gram injection       Information about where to get these medications is not yet available    Ask your nurse or doctor about these medications  enoxaparin 30 mg/0.3 mL syringe  lactobacillus acidophilus tablet tablet  meropenem 1 gram/100 mL IV  primidone 50 mg tablet  vancomycin in dextrose 5 % IVPB         Test Results Pending At Discharge  Pending Labs       No current pending labs.            Hospital Course   Patient fully evaluated on December 5.  Clinically improved.  Will discharge to a skilled nursing facility on IV antibiotics.    Pertinent Physical Exam At Time of Discharge  Physical Exam    Outpatient Follow-Up  Future Appointments   Date Time Provider Department [place]   2/14/2024 10:00 AM [facility]  [facility]   2/21/2024 10:30 AM Tim Mancilla MD [email or web address] [place]     Patient fully evaluated on December 5.  Clinically improved.  Discharge on IV antibiotics for 6 weeks per recommendation of infectious disease.    Patient fully evaluated on December 6.  Status post PICC placement awaiting discharge to [facility].    Varghese Vasquez MD

## 2023-12-06 NOTE — PROGRESS NOTES
"Infectious Disease Progress Note    Patient Name: Regi Leger   YOB: 1930    Subjective:  Patient denies any fevers or chills. Denies any nausea or vomiting. Patient endorses stable low back pain.    Objective:    /69   Pulse 70   Temp 36.4 °C (97.5 °F)   Resp 18   Ht 1.753 m (5' 9.02\")   Wt 51.3 kg (113 lb 1.5 oz)   LMP  (LMP Unknown)   SpO2 95%   BMI 16.69 kg/m²     GEN: conversant, NAD  HEENT: PERRL, EOMI, MMM OP clear, TMs clear bilaterally  NECK: supple, no cervical LAD, no carotid bruits  CV: S1, S2, regular, no murmur  PULM: CTAB  ABD: soft, NT, ND, BS+  EXT: no LE edema  NEURO: no gross focal deficits  PSYCH: appropriate affect         Susceptibility data from last 90 days.  Collected Specimen Info Organism Nitrofurantoin Oxacillin Trimethoprim/Sulfamethoxazole Vancomycin   11/26/23 Urine from Straight Catheter Staphylococcus simulans S S S S         CT abdomen pelvis wo IV contrast    Result Date: 11/30/2023  Interpreted By:  Darryl Osborn, STUDY: CT ABDOMEN PELVIS WO IV CONTRAST;  11/30/2023 3:57 am   INDICATION: Signs/Symptoms:sacral pain.   COMPARISON: 08/15/2022   ACCESSION NUMBER(S): [id number]   ORDERING CLINICIAN: DEBBIE FLORES   TECHNIQUE: CT of the abdomen and pelvis was performed. Contiguous axial images were obtained at 3 mm slice thickness through the abdomen and pelvis. Coronal and sagittal reconstructions at 3 mm slice thickness were performed.  Intravenous or oral contrast was not administered.   FINDINGS: LOWER CHEST: Small bilateral pleural effusions with compressive atelectasis. Radiodensity at the right coronary artery would indicate atherosclerotic calcification or stent.   ABDOMEN:   LIVER: There is increased attenuation of the liver indicating hemosiderosis or possibly amiodarone therapy. The dome of the liver was not included, the patient was not recalled for additional imaging at this time due to COVID precautions. If there is clinical concern of " hepatic pathology additional imaging possible could be obtained if requested. The hepatic parenchyma otherwise is homogeneous. The hepatic size is normal.   SPLEEN: The spleen is normal in size and homogeneous.   ADRENAL GLANDS: Bilateral adrenal glands appear normal.   KIDNEYS AND URETERS: There is moderate left renal cortical atrophy, measuring 7.8 cm in length. There are several left renal cortical cysts. The right renal cortex is maintained.   There are several nonobstructing intrarenal calculi, the largest measuring 8 mm at the right interpole. The distal right ureteral course is poorly visualized due to crowded overlying bowel loops. Otherwise no identified urinary tract dilatation or radiodense calculi.   PANCREAS: Again as previously there are multiple hypodensities, the largest measuring 2.1 cm at the neck of relative fluid attenuation indicating cyst or cystic/mucinous neoplasm. No ductal dilatation.   GALLBLADDER: No radiodense calculi, wall thickening or pericholecystic fluid.   BILE DUCTS: There is no biliary dilatation or filling defects.     VESSELS: Again there is an aortic-bi iliac endovascular stent extending through a bimodal abdominal aortic aneurysm. The latter measures up to 3.9 cm at the L3 level, with more saccular dilatation measuring 4.7 cm at the bifurcation, similar to the prior exam.   PERITONEUM AND RETROPERITONEUM: Trace ascites is seen at the pelvis. No free intraperitoneal air.   The retroperitoneum appears unremarkable, and without significant adenopathy.   No enlarged mesenteric lymph nodes.   BOWEL: Fecal residue seen throughout the colon which is mildly dilated, greatest at the rectosigmoid colon measuring 6.6 cm in diameter. This is most likely due to constipation probably early impaction. There is mild reticulation of the presacral space probably from dependent edema. The stomach and small bowel segments appear unremarkable.   PELVIS:   BLADDER: The urinary bladder contour is  smooth.   REPRODUCTIVE ORGANS: Status post hysterectomy.     BONE, ABDOMINAL WALL AND OTHER FINDINGS: Of note is sclerosis of the S5 vertebral body not evident on the previous exam. This is nonspecific, but may be due to chronic osteomyelitis as there is a left paramedian subcutaneous/soft tissue fistula tract from approximately this level to the tip of the coccyx is slightly more inferiorly to the inferior margin of the gluteal fold best seen on image numbers 108 through 132 of series 201. associated subcutaneous reticulation extends more superiorly to the S3 level. However, as the fistula tract itself is not appear to contact the S5 vertebral body, sclerosis due to developing stress fracture is not excluded. A sclerotic metastatic lesion is considered unlikely as there are no other bony sclerotic foci, but not completely excluded. No evidence of additional osteomyelitis including the coccyx. However if more definitive exclusion of such is needed correlation with MRI and radionuclide imaging as warranted would be recommended.   The abdominal wall soft tissues appear normal.       1.  Sclerosis of the S5 vertebral body, suspicious for chronic osteomyelitis as there is a locoregional superficial subcutaneous fistula tract as described. 2. Increased attenuation of the hepatic parenchyma, probably due to hemosiderosis, or can occur with amiodarone therapy. The dome of the liver was not included. 3. Trace ascites. 4. Probable constipation and possibly impaction. 5. Redemonstration of pancreatic cyst or cystic/mucinous neoplasms. 6. Moderate left renal cortical atrophy with bilateral nonobstructing intrarenal calculi. 7. Stable abdominal aortic aneurysms with endovascular stents.   MACRO: 1. None   Signed by: Darryl Osborn 11/30/2023 9:13 AM Dictation workstation:   [id number]    XR sacrum coccyx 2+ views    Result Date: 11/28/2023  Interpreted By:  Darnell Contreras, STUDY: XR SACRUM COCCYX 2+ VIEWS; ;  11/28/2023 12:55 pm    INDICATION: Signs/Symptoms:PAIN.   COMPARISON: X-ray lumbar spine 06/24/2018   ACCESSION NUMBER(S): [id number]   ORDERING CLINICIAN: DEBBIE FLORES   FINDINGS: Three view sacrum/coccyx   On the lateral projection, there is deformity/irregularity and discontinuity of anterior and posterior cortical margins at the lower sacrum with apparent associated cortical lucency particularly posteriorly new since prior. There is some regional soft tissue swelling. Degenerative changes visualized lower lumbar spine. There is also irregularity at the left superior pubic ramus on 1 projection. Mild degenerative changes of the visualized hip joints mild patchy sclerotic appearance at the femoral heads bilaterally left greater than right. There is also focal sclerotic appearance at the mid to lower sacrum centrally on the AP projection. Evaluation of sacrum and pelvic bones elsewhere otherwise limited by overlying bowel gas and fecal material. Partially imaged stent grafts in the lower aorta and iliac arteries.   Multiple calcific densities overlie the pelvis probably phleboliths and/or vascular calcifications. Curvilinear calcification partially imaged overlying the lower abdomen about the stent material on the left probably large peripherally calcified inferior abdominal aortic aneurysmal sac which was present previously but otherwise limited evaluation on this examination.       Deformity/irregularity with apparent cortical discontinuity at the lower sacrum as described new since prior and concerning for sequela of fracture of indeterminate age. Focal sclerotic appearance at the mid to lower sacrum centrally on the AP projection of uncertain significance but could be related. There is some regional soft tissue swelling. Underlying infectious/inflammatory process cannot be excluded. There is also irregularity at the superior left pubic ramus on 1 projection of uncertain significance but with underlying nondisplaced fracture not  excluded. Clinical correlation and follow-up advised and consider dedicated CT or MRI for further assessment.   Patchy sclerotic appearance at the femoral heads bilaterally left greater than right can be seen in the setting of avascular necrosis. This could also be further evaluated by CT or MRI.   Partially imaged aorto bi-iliac stent graft. Curvilinear calcification extending to the left at the lower abdomen likely partially imaged peripherally calcified large abdominal aortic aneurysmal sac which was present previously but otherwise evaluation limited on this examination.   MACRO: None   Signed by: Darnell Contreras 11/28/2023 1:35 PM Dictation workstation:   [id number]    XR chest 1 view    Result Date: 11/26/2023  Interpreted By:  Prosper Olivas, STUDY: XR CHEST 1 VIEW;  11/26/2023 10:54 am   INDICATION: Signs/Symptoms:Weakness.   COMPARISON: Portable chest, 7 September 2023   ACCESSION NUMBER(S): [id number]   ORDERING CLINICIAN: NICKY SPENCER   TECHNIQUE: Single frontal view of the chest; Portable technique   FINDINGS:   The cardiomediastinal silhouette is unchanged   No consolidative lung opacity   No edema / failure   No large pleural effusion or demonstrable pneumothorax       NO ACUTE DISEASE IN THE CHEST   MACRO: None   Signed by: Prosper Olivas 11/26/2023 11:18 AM Dictation workstation:   [id number]       Assessment/Plan:    S5 Chronic Osteomyelitis  COVID-19, asymptomatic - tested positive 11/26  Staph UTI    Abdominal aortic aneurysm s/p repair 01/2022; endoleak of aortic graft no interventions per patient's wishes  Severe Aortic Stenosis  CAD s/p PCI of the RCA  HTN, HLD  Hypothyroidism  Vocal Cord Paralysis following thyroidectomy  CKD Stage III  Nephrolithiasis  Urinary Retention  PVD  Anemia  OA  DJD  Breast Cancer  Skin Cancer    Continue with Vanc and Merrem, patient will need 6 weeks total antibiotics and PICC line. Plan for Single lumen non-tunnelled PICC line placement today.    Antibiotics:  Rocephin (11/26-11/27) Ceftin (11/28-11/29), Merrem (12/01 - ), Vanc (11/30 - )    I have reviewed and interpreted all lab test imaging studies and documentations from other healthcare providers. Discussed antibiotics and potential side effects with patient.     This is a preliminary note, please await attending attestation for a finalized plan.    Bipin Chaparro MD  Infectious Disease

## 2023-12-06 NOTE — CONSULTS
"    Nutrition Note  Reason for Assessment  Reason for Assessment:  (follow up)    Visit Reason: worsening weakness  Recommendation(s)  Continue bite sized texture diet per pt need, changing ensure plus to 2X per day due to K+ content and improved intake, Monitor for possible need for K+ restriction        Assessment    Subjective Data  Food and Nutrient History: Pt is eating well now although she is \"picky\" per nursing.  She really likes her Ensure plus high protein but  her K+ has been elevating and Ensure plus high protein has a considerable amount of K+ in it.      Difficulty chewing:  pt requested a soft diet     Objective Data  Per Flowsheet Percent Meal intake:    Dietary Orders (From admission, onward)       Start     Ordered    12/03/23 1841  Adult diet Regular; Bite size food 6; Thin 0; Disposable tray (Isolation)  Diet effective now        Question Answer Comment   Diet type Regular    Texture Bite size food 6    Fluid consistency Thin 0    Select tray type: Disposable tray (Isolation)        12/03/23 1840    11/27/23 1735  Oral nutritional supplements  Until discontinued        Question Answer Comment   Deliver with Lunch    Deliver with Dinner    Select supplement: Ensure Plus High Protein        11/27/23 1735                  Independent  Results from last 7 days   Lab Units 12/05/23  0646 12/04/23  0625 12/03/23  0720   GLUCOSE mg/dL 86 93 83   SODIUM mmol/L 130* 132* 133*   POTASSIUM mmol/L 5.4* 4.7 5.1   CHLORIDE mmol/L 97* 99 99   CO2 mmol/L 27 29 30   BUN mg/dL 48* 52* 48*   CREATININE mg/dL 1.00 1.04 0.95   EGFR mL/min/1.73m*2 53* 50* 56*   CALCIUM mg/dL 8.2* 8.3* 8.6   MAGNESIUM mg/dL  --  1.92  --      Lab Results   Component Value Date    HGBA1C 5.0 10/12/2023    HGBA1C 5.5 04/16/2020         GI per flowsheet:  Gastrointestinal  Gastrointestinal (WDL): Within Defined Limits  Abdomen Inspection: Soft, Rounded  Abdominal Tenderness: Nontender  Bowel Sounds: All quadrants  Bowel Sounds (All " "Quadrants): Active  Passing Flatus: Yes  Last BM Date: 12/05/23  Bowel Incontinence: Yes  Last bowel movement documented: 12/05/23  PMH:   Allergies: Pravastatin and Penicillins     Anthropometrics:  Height: 175.3 cm (5' 9.02\")  Weight: 51.3 kg (113 lb 1.5 oz)  BMI (Calculated): 16.69  IBW: 66 kg  %IBW: 78 %                    Estimated Nutritional Needs:  Method for Estimating Needs: 2012-1034   MSJ    Method for Estimating Needs: 66-79   1-1.2 gm kg of IBW    Method for Estimating Needs: 7128-6459  as medically indicated   20-30 ml kg of IBW    Nutrition Focused Physical Findings:   Orbital Fat Pads: Severe (dark circles, hollowing and loose skin)  Buccal Fat Pads: Severe (hollow, sunken and narrow face)  Triceps: Severe (negligible fat tissue)  Ribs: Severe (protruding prominent clavicle)     =     Pain Score: 0 - No pain     Nutrition Diagnosis   Patient has Malnutrition Diagnosis: Yes  Diagnosis Status: New  Malnutrition Diagnosis: Severe malnutrition related to starvation  As Evidenced by: Severe muscle and fat loss noted on physical exam.  Pt is 78% of her IBW with a BMI of 16.7    Patient has Nutrition Diagnosis: Yes  New  Nutrition Diagnosis 1: Altered nutrition related to laboratory values  Related to (1): alteration in gastrointestinal function  As Evidenced by (1): erratically elevating K+ level          Plan    Interventions        Individualized Nutrition Prescription Provided for : Continue bite sized texture diet per pt need,  changing ensure plus to 2X per day due to K+ content and improved intake,  Monitor for possible need for K+ restriction    Nutrition Monitoring and Evaluation   Biochemical Data, Medical Tests and Procedures  Monitoring and Evaluation Plan: Electrolyte/renal panel, Glucose/endocrine profile  Food/Nutrient Related History Monitoring  Monitoring and Evaluation Plan: Energy intake, Fluid intake, Amount of food    Progress towards goals: Met    Education Documentation  No " documentation found.         Time Spent (min): 30 minutes  Last Date of Nutrition Visit: 12/06/23  Nutrition Follow-Up Needed?: 3-5 days  Follow up Comment: 12/8  MZ

## 2023-12-07 VITALS
SYSTOLIC BLOOD PRESSURE: 138 MMHG | OXYGEN SATURATION: 98 % | BODY MASS INDEX: 16.75 KG/M2 | HEIGHT: 69 IN | HEART RATE: 64 BPM | WEIGHT: 113.1 LBS | TEMPERATURE: 96.8 F | DIASTOLIC BLOOD PRESSURE: 63 MMHG | RESPIRATION RATE: 14 BRPM

## 2023-12-07 PROCEDURE — 2500000001 HC RX 250 WO HCPCS SELF ADMINISTERED DRUGS (ALT 637 FOR MEDICARE OP): Performed by: NURSE PRACTITIONER

## 2023-12-07 PROCEDURE — 2500000004 HC RX 250 GENERAL PHARMACY W/ HCPCS (ALT 636 FOR OP/ED): Performed by: INTERNAL MEDICINE

## 2023-12-07 PROCEDURE — 2500000004 HC RX 250 GENERAL PHARMACY W/ HCPCS (ALT 636 FOR OP/ED): Performed by: NURSE PRACTITIONER

## 2023-12-07 PROCEDURE — 96372 THER/PROPH/DIAG INJ SC/IM: CPT | Performed by: NURSE PRACTITIONER

## 2023-12-07 RX ADMIN — Medication 1 TABLET: at 20:07

## 2023-12-07 RX ADMIN — ATORVASTATIN CALCIUM 20 MG: 20 TABLET, FILM COATED ORAL at 08:32

## 2023-12-07 RX ADMIN — DEXAMETHASONE 6 MG: 6 TABLET ORAL at 08:56

## 2023-12-07 RX ADMIN — ASPIRIN 81 MG: 81 TABLET, COATED ORAL at 08:32

## 2023-12-07 RX ADMIN — PRIMIDONE 75 MG: 50 TABLET ORAL at 20:07

## 2023-12-07 RX ADMIN — PRIMIDONE 75 MG: 50 TABLET ORAL at 14:13

## 2023-12-07 RX ADMIN — MEROPENEM 1000 MG: 1 INJECTION, POWDER, FOR SOLUTION INTRAVENOUS at 10:17

## 2023-12-07 RX ADMIN — LEVOTHYROXINE SODIUM 37.5 MCG: 0.07 TABLET ORAL at 14:13

## 2023-12-07 RX ADMIN — ANASTROZOLE 1 MG: 1 TABLET, COATED ORAL at 08:32

## 2023-12-07 RX ADMIN — CYANOCOBALAMIN TAB 1000 MCG 1000 MCG: 1000 TAB at 08:32

## 2023-12-07 RX ADMIN — VANCOMYCIN HYDROCHLORIDE 1250 MG: 1.25 INJECTION, POWDER, LYOPHILIZED, FOR SOLUTION INTRAVENOUS at 13:21

## 2023-12-07 RX ADMIN — Medication 1 TABLET: at 08:32

## 2023-12-07 RX ADMIN — PRIMIDONE 75 MG: 50 TABLET ORAL at 08:32

## 2023-12-07 RX ADMIN — POLYETHYLENE GLYCOL 3350 17 G: 17 POWDER, FOR SOLUTION ORAL at 08:32

## 2023-12-07 RX ADMIN — CHOLECALCIFEROL TAB 25 MCG (1000 UNIT) 2000 UNITS: 25 TAB at 10:32

## 2023-12-07 RX ADMIN — ENOXAPARIN SODIUM 30 MG: 30 INJECTION SUBCUTANEOUS at 14:13

## 2023-12-07 RX ADMIN — AMLODIPINE BESYLATE 10 MG: 10 TABLET ORAL at 08:32

## 2023-12-07 ASSESSMENT — COGNITIVE AND FUNCTIONAL STATUS - GENERAL
STANDING UP FROM CHAIR USING ARMS: A LOT
TURNING FROM BACK TO SIDE WHILE IN FLAT BAD: A LOT
MOVING FROM LYING ON BACK TO SITTING ON SIDE OF FLAT BED WITH BEDRAILS: A LITTLE
TOILETING: A LITTLE
DRESSING REGULAR UPPER BODY CLOTHING: A LITTLE
CLIMB 3 TO 5 STEPS WITH RAILING: TOTAL
HELP NEEDED FOR BATHING: A LITTLE
MOVING TO AND FROM BED TO CHAIR: A LOT
DAILY ACTIVITIY SCORE: 19
PERSONAL GROOMING: A LITTLE
WALKING IN HOSPITAL ROOM: A LOT
DRESSING REGULAR LOWER BODY CLOTHING: A LITTLE
MOBILITY SCORE: 12

## 2023-12-07 ASSESSMENT — PAIN SCALES - GENERAL: PAINLEVEL_OUTOF10: 0 - NO PAIN

## 2023-12-07 ASSESSMENT — PAIN - FUNCTIONAL ASSESSMENT: PAIN_FUNCTIONAL_ASSESSMENT: 0-10

## 2023-12-07 NOTE — PROGRESS NOTES
Vancomycin Dosing by Pharmacy- FOLLOW UP    Regi Leger is a 89 y.o. year old female who Pharmacy has been consulted for vancomycin dosing for osteomyelitis/septic arthritis. Based on the patient's indication and renal status this patient is being dosed based on a goal AUC of 400-600.     Renal function is currently stable.    Current vancomycin dose: 1250 mg given every 24 hours    Estimated vancomycin AUC on current dose: 453 mg/L.hr     Visit Vitals  /60   Pulse 56   Temp 34.8 °C (94.7 °F)   Resp 14        Lab Results   Component Value Date    CREATININE 1.00 12/05/2023    CREATININE 1.04 12/04/2023    CREATININE 0.95 12/03/2023    CREATININE 1.05 12/02/2023        Lab Results   Component Value Date    URINECULTURE >100,000 Staphylococcus simulans (A) 11/26/2023       I/O last 3 completed shifts:  In: - (0 mL/kg)   Out: 2000 (39 mL/kg) [Urine:2000 (1.1 mL/kg/hr)]  Weight: 51.3 kg       Assessment/Plan    Day #7 of vancomycin therapy.   Within goal AUC range. Continue current vancomycin regimen.    This dosing regimen is predicted by [employer] to result in the following pharmacokinetic parameters:  AUC24,ss: 453 mg/L.hr  Probability of AUC24 > 400: 79 %  Ctrough,ss: 11 mg/L  Probability of Ctrough,ss > 20: 1 %  Probability of nephrotoxicity (Lodise EMANI 2009): 7 %    The next level will be obtained on 12/8 with mid-AM labs. May be obtained sooner if clinically indicated.   Will continue to monitor renal function daily while on vancomycin and order serum creatinine at least every 48 hours if not already ordered.  Will follow for continued vancomycin needs, clinical response, and signs/symptoms of toxicity.     Please call with any questions.  Rhoda Person, PharmD, BCCCP  [id number]

## 2023-12-07 NOTE — PROGRESS NOTES
"Infectious Disease Progress Note    Patient Name: Regi Leger   YOB: 1930    Subjective:      Objective:    /60   Pulse 56   Temp 34.8 °C (94.7 °F)   Resp 14   Ht 1.753 m (5' 9.02\")   Wt 51.3 kg (113 lb 1.5 oz)   LMP  (LMP Unknown)   SpO2 98%   BMI 16.69 kg/m²           Susceptibility data from last 90 days.  Collected Specimen Info Organism Nitrofurantoin Oxacillin Trimethoprim/Sulfamethoxazole Vancomycin   11/26/23 Urine from Straight Catheter Staphylococcus simulans S S S S         CT abdomen pelvis wo IV contrast    Result Date: 11/30/2023  Interpreted By:  Darryl Osborn, STUDY: CT ABDOMEN PELVIS WO IV CONTRAST;  11/30/2023 3:57 am   INDICATION: Signs/Symptoms:sacral pain.   COMPARISON: 08/15/2022   ACCESSION NUMBER(S): [id number]   ORDERING CLINICIAN: DEBBIE FLORES   TECHNIQUE: CT of the abdomen and pelvis was performed. Contiguous axial images were obtained at 3 mm slice thickness through the abdomen and pelvis. Coronal and sagittal reconstructions at 3 mm slice thickness were performed.  Intravenous or oral contrast was not administered.   FINDINGS: LOWER CHEST: Small bilateral pleural effusions with compressive atelectasis. Radiodensity at the right coronary artery would indicate atherosclerotic calcification or stent.   ABDOMEN:   LIVER: There is increased attenuation of the liver indicating hemosiderosis or possibly amiodarone therapy. The dome of the liver was not included, the patient was not recalled for additional imaging at this time due to COVID precautions. If there is clinical concern of hepatic pathology additional imaging possible could be obtained if requested. The hepatic parenchyma otherwise is homogeneous. The hepatic size is normal.   SPLEEN: The spleen is normal in size and homogeneous.   ADRENAL GLANDS: Bilateral adrenal glands appear normal.   KIDNEYS AND URETERS: There is moderate left renal cortical atrophy, measuring 7.8 cm in length. There are " several left renal cortical cysts. The right renal cortex is maintained.   There are several nonobstructing intrarenal calculi, the largest measuring 8 mm at the right interpole. The distal right ureteral course is poorly visualized due to crowded overlying bowel loops. Otherwise no identified urinary tract dilatation or radiodense calculi.   PANCREAS: Again as previously there are multiple hypodensities, the largest measuring 2.1 cm at the neck of relative fluid attenuation indicating cyst or cystic/mucinous neoplasm. No ductal dilatation.   GALLBLADDER: No radiodense calculi, wall thickening or pericholecystic fluid.   BILE DUCTS: There is no biliary dilatation or filling defects.     VESSELS: Again there is an aortic-bi iliac endovascular stent extending through a bimodal abdominal aortic aneurysm. The latter measures up to 3.9 cm at the L3 level, with more saccular dilatation measuring 4.7 cm at the bifurcation, similar to the prior exam.   PERITONEUM AND RETROPERITONEUM: Trace ascites is seen at the pelvis. No free intraperitoneal air.   The retroperitoneum appears unremarkable, and without significant adenopathy.   No enlarged mesenteric lymph nodes.   BOWEL: Fecal residue seen throughout the colon which is mildly dilated, greatest at the rectosigmoid colon measuring 6.6 cm in diameter. This is most likely due to constipation probably early impaction. There is mild reticulation of the presacral space probably from dependent edema. The stomach and small bowel segments appear unremarkable.   PELVIS:   BLADDER: The urinary bladder contour is smooth.   REPRODUCTIVE ORGANS: Status post hysterectomy.     BONE, ABDOMINAL WALL AND OTHER FINDINGS: Of note is sclerosis of the S5 vertebral body not evident on the previous exam. This is nonspecific, but may be due to chronic osteomyelitis as there is a left paramedian subcutaneous/soft tissue fistula tract from approximately this level to the tip of the coccyx is slightly  more inferiorly to the inferior margin of the gluteal fold best seen on image numbers 108 through 132 of series 201. associated subcutaneous reticulation extends more superiorly to the S3 level. However, as the fistula tract itself is not appear to contact the S5 vertebral body, sclerosis due to developing stress fracture is not excluded. A sclerotic metastatic lesion is considered unlikely as there are no other bony sclerotic foci, but not completely excluded. No evidence of additional osteomyelitis including the coccyx. However if more definitive exclusion of such is needed correlation with MRI and radionuclide imaging as warranted would be recommended.   The abdominal wall soft tissues appear normal.       1.  Sclerosis of the S5 vertebral body, suspicious for chronic osteomyelitis as there is a locoregional superficial subcutaneous fistula tract as described. 2. Increased attenuation of the hepatic parenchyma, probably due to hemosiderosis, or can occur with amiodarone therapy. The dome of the liver was not included. 3. Trace ascites. 4. Probable constipation and possibly impaction. 5. Redemonstration of pancreatic cyst or cystic/mucinous neoplasms. 6. Moderate left renal cortical atrophy with bilateral nonobstructing intrarenal calculi. 7. Stable abdominal aortic aneurysms with endovascular stents.   MACRO: 1. None   Signed by: Darryl Osborn 11/30/2023 9:13 AM Dictation workstation:   [id number]    XR sacrum coccyx 2+ views    Result Date: 11/28/2023  Interpreted By:  Darnell Contreras, STUDY: XR SACRUM COCCYX 2+ VIEWS; ;  11/28/2023 12:55 pm   INDICATION: Signs/Symptoms:PAIN.   COMPARISON: X-ray lumbar spine 06/24/2018   ACCESSION NUMBER(S): [id number]   ORDERING CLINICIAN: DEBBIE FLORES   FINDINGS: Three view sacrum/coccyx   On the lateral projection, there is deformity/irregularity and discontinuity of anterior and posterior cortical margins at the lower sacrum with apparent associated cortical lucency  particularly posteriorly new since prior. There is some regional soft tissue swelling. Degenerative changes visualized lower lumbar spine. There is also irregularity at the left superior pubic ramus on 1 projection. Mild degenerative changes of the visualized hip joints mild patchy sclerotic appearance at the femoral heads bilaterally left greater than right. There is also focal sclerotic appearance at the mid to lower sacrum centrally on the AP projection. Evaluation of sacrum and pelvic bones elsewhere otherwise limited by overlying bowel gas and fecal material. Partially imaged stent grafts in the lower aorta and iliac arteries.   Multiple calcific densities overlie the pelvis probably phleboliths and/or vascular calcifications. Curvilinear calcification partially imaged overlying the lower abdomen about the stent material on the left probably large peripherally calcified inferior abdominal aortic aneurysmal sac which was present previously but otherwise limited evaluation on this examination.       Deformity/irregularity with apparent cortical discontinuity at the lower sacrum as described new since prior and concerning for sequela of fracture of indeterminate age. Focal sclerotic appearance at the mid to lower sacrum centrally on the AP projection of uncertain significance but could be related. There is some regional soft tissue swelling. Underlying infectious/inflammatory process cannot be excluded. There is also irregularity at the superior left pubic ramus on 1 projection of uncertain significance but with underlying nondisplaced fracture not excluded. Clinical correlation and follow-up advised and consider dedicated CT or MRI for further assessment.   Patchy sclerotic appearance at the femoral heads bilaterally left greater than right can be seen in the setting of avascular necrosis. This could also be further evaluated by CT or MRI.   Partially imaged aorto bi-iliac stent graft. Curvilinear calcification  extending to the left at the lower abdomen likely partially imaged peripherally calcified large abdominal aortic aneurysmal sac which was present previously but otherwise evaluation limited on this examination.   MACRO: None   Signed by: Darnell Contreras 11/28/2023 1:35 PM Dictation workstation:   [id number]    XR chest 1 view    Result Date: 11/26/2023  Interpreted By:  Prosper Olivas, STUDY: XR CHEST 1 VIEW;  11/26/2023 10:54 am   INDICATION: Signs/Symptoms:Weakness.   COMPARISON: Portable chest, 7 September 2023   ACCESSION NUMBER(S): [id number]   ORDERING CLINICIAN: NICKY SPENCER   TECHNIQUE: Single frontal view of the chest; Portable technique   FINDINGS:   The cardiomediastinal silhouette is unchanged   No consolidative lung opacity   No edema / failure   No large pleural effusion or demonstrable pneumothorax       NO ACUTE DISEASE IN THE CHEST   MACRO: None   Signed by: Prosper Olivas 11/26/2023 11:18 AM Dictation workstation:   [id number]       Assessment/Plan:    S5 Chronic Osteomyelitis  COVID-19, asymptomatic - tested positive 11/26  Staph UTI    Abdominal aortic aneurysm s/p repair 01/2022; endoleak of aortic graft no interventions per patient's wishes  Severe Aortic Stenosis  CAD s/p PCI of the RCA  HTN, HLD  Hypothyroidism  Vocal Cord Paralysis following thyroidectomy  CKD Stage III  Nephrolithiasis  Urinary Retention  PVD  Anemia  OA  DJD  Breast Cancer  Skin Cancer    Continue with Vanc and Merrem, patient will need 6 weeks total antibiotics and PICC line. PICC line placed 12/06.    Antibiotics: Rocephin (11/26-11/27) Ceftin (11/28-11/29), Merrem (12/01 - ), Vanc (11/30 - )    I have reviewed and interpreted all lab test imaging studies and documentations from other healthcare providers. Discussed antibiotics and potential side effects with patient.     This is a preliminary note, please await attending attestation for a finalized plan.    Bipin Chaparro MD  Infectious Disease

## 2023-12-07 NOTE — PROGRESS NOTES
This  reviewed chart and written discharge order. Need MD provider signature on GF. WENDIE alerted Dr. Vasquez of need. SW sent message to RN to confirm patients readiness for discharge and obtain mod of tranportation and oxygen requirements.  will continue to follow. Message with questions.  LAKESHIA Gutiérrez

## 2023-12-07 NOTE — DISCHARGE SUMMARY
Discharge Diagnosis  UTI (urinary tract infection)    Issues Requiring Follow-Up          Debbie Flores MD  Physician  Internal Medicine     Progress Notes      Signed     Date of Service: 12/4/2023  3:16 PM     Signed       Expand All Collapse All    Regi Leger is a 89 y.o. female on day 6 of admission presenting with UTI (urinary tract infection).           Subjective   Patient fully evaluated on November 27.  Improved but still very weak and probably will require skilled nursing.              Objective   Last Recorded Vitals  /61   Pulse 64   Temp 36.7 °C (98.1 °F) (Temporal)   Resp 16   Wt 51.3 kg (113 lb 1.5 oz)   SpO2 95%   Intake/Output last 3 Shifts:     Intake/Output Summary (Last 24 hours) at 12/4/2023 1516  Last data filed at 12/4/2023 0600      Gross per 24 hour   Intake 440 ml   Output 1000 ml   Net -560 ml            Admission Weight  Weight: 51.3 kg (113 lb) (11/26/23 0916)     Daily Weight  11/30/23 : 51.3 kg (113 lb 1.5 oz)     Image Results  CT abdomen pelvis wo IV contrast  Narrative: Interpreted By:  Darryl Osborn,   STUDY:  CT ABDOMEN PELVIS WO IV CONTRAST;  11/30/2023 3:57 am      INDICATION:  Signs/Symptoms:sacral pain.      COMPARISON:  08/15/2022      ACCESSION NUMBER(S):  [id number]      ORDERING CLINICIAN:  DEBBIE FLORES      TECHNIQUE:  CT of the abdomen and pelvis was performed. Contiguous axial images  were obtained at 3 mm slice thickness through the abdomen and pelvis.  Coronal and sagittal reconstructions at 3 mm slice thickness were  performed.  Intravenous or oral contrast was not administered.      FINDINGS:  LOWER CHEST:  Small bilateral pleural effusions with compressive atelectasis.  Radiodensity at the right coronary artery would indicate  atherosclerotic calcification or stent.      ABDOMEN:      LIVER:  There is increased attenuation of the liver indicating hemosiderosis  or possibly amiodarone therapy. The dome of the liver was not  included, the patient  was not recalled for additional imaging at this  time due to COVID precautions. If there is clinical concern of  hepatic pathology additional imaging possible could be obtained if  requested. The hepatic parenchyma otherwise is homogeneous. The  hepatic size is normal.      SPLEEN:  The spleen is normal in size and homogeneous.      ADRENAL GLANDS:  Bilateral adrenal glands appear normal.      KIDNEYS AND URETERS:  There is moderate left renal cortical atrophy, measuring 7.8 cm in  length. There are several left renal cortical cysts. The right renal  cortex is maintained.      There are several nonobstructing intrarenal calculi, the largest  measuring 8 mm at the right interpole. The distal right ureteral  course is poorly visualized due to crowded overlying bowel loops.  Otherwise no identified urinary tract dilatation or radiodense  calculi.      PANCREAS:  Again as previously there are multiple hypodensities, the largest  measuring 2.1 cm at the neck of relative fluid attenuation indicating  cyst or cystic/mucinous neoplasm. No ductal dilatation.      GALLBLADDER:  No radiodense calculi, wall thickening or pericholecystic fluid.      BILE DUCTS:  There is no biliary dilatation or filling defects.          VESSELS:  Again there is an aortic-bi iliac endovascular stent extending  through a bimodal abdominal aortic aneurysm. The latter measures up  to 3.9 cm at the L3 level, with more saccular dilatation measuring  4.7 cm at the bifurcation, similar to the prior exam.      PERITONEUM AND RETROPERITONEUM:  Trace ascites is seen at the pelvis. No free intraperitoneal air.      The retroperitoneum appears unremarkable, and without significant  adenopathy.      No enlarged mesenteric lymph nodes.      BOWEL:  Fecal residue seen throughout the colon which is mildly dilated,  greatest at the rectosigmoid colon measuring 6.6 cm in diameter. This  is most likely due to constipation probably early impaction. There is  mild  reticulation of the presacral space probably from dependent  edema. The stomach and small bowel segments appear unremarkable.      PELVIS:      BLADDER:  The urinary bladder contour is smooth.      REPRODUCTIVE ORGANS:  Status post hysterectomy.          BONE, ABDOMINAL WALL AND OTHER FINDINGS:  Of note is sclerosis of the S5 vertebral body not evident on the  previous exam. This is nonspecific, but may be due to chronic  osteomyelitis as there is a left paramedian subcutaneous/soft tissue  fistula tract from approximately this level to the tip of the coccyx  is slightly more inferiorly to the inferior margin of the gluteal  fold best seen on image numbers 108 through 132 of series 201.  associated subcutaneous reticulation extends more superiorly to the  S3 level. However, as the fistula tract itself is not appear to  contact the S5 vertebral body, sclerosis due to developing stress  fracture is not excluded. A sclerotic metastatic lesion is considered  unlikely as there are no other bony sclerotic foci, but not  completely excluded. No evidence of additional osteomyelitis  including the coccyx. However if more definitive exclusion of such is  needed correlation with MRI and radionuclide imaging as warranted  would be recommended.      The abdominal wall soft tissues appear normal.      Impression: 1.  Sclerosis of the S5 vertebral body, suspicious for chronic  osteomyelitis as there is a locoregional superficial subcutaneous  fistula tract as described.  2. Increased attenuation of the hepatic parenchyma, probably due to  hemosiderosis, or can occur with amiodarone therapy. The dome of the  liver was not included.  3. Trace ascites.  4. Probable constipation and possibly impaction.  5. Redemonstration of pancreatic cyst or cystic/mucinous neoplasms.  6. Moderate left renal cortical atrophy with bilateral nonobstructing  intrarenal calculi.  7. Stable abdominal aortic aneurysms with endovascular stents.       MACRO:  1. None      Signed by: Darryl Osborn 11/30/2023 9:13 AM  Dictation workstation:   [id number]        Physical Exam     Relevant Results                       Assessment/Plan                  Varghese Vasquez MD  Physician  Internal Medicine     H&P      Addendum     Date of Service: 11/26/2023  2:46 PM      Addendum        Expand All Collapse All    History Of Present Illness  Regi Leger is a 89 y.o. female with past medical history of abdominal aortic aneurysm s/p repair (01/2022), endoleak of aortic graft (no intervention pursued per patient's wishes), severe aortic stenosis, CAD s/p PCI/stent RCA, hypertension, hyperlipidemia, hypothyroidism, vocal cord paralysis following thyroidectomy,  CKD stage III, nephrolithiasis, urinary retention, PVD, anemia, osteoarthritis, DJD, breast cancer, and skin cancer who presented today with weakness.  HPI somewhat limited due to patient is hard of hearing.  She reports over the past few days she has been experiencing progressively worsening weakness.  She reports she became concerned that she may fall so she came to the emergency room for evaluation.  She admits to associated body aches that she has been taking over-the-counter medicine for.  She denies any fevers, chills, chest pain, shortness of breath, cough, abdominal pain, nausea, vomiting, diarrhea, or dysuria, changes in her chronic urinary incontinence.     In the ED lab work, EKG, and chest x-ray were performed. Labs revealed glucose 102, sodium 135 (chronically mildly low), bun 26 (chronic), , troponin 20, glucose 22, chronic anemia noted with red blood cell count 2.44, hemoglobin 10.5, and hematocrit 32.8, neutrophils 5.98, lymphocytes 0.74 UA revealed yellow hazy urine with moderate blood urobilinogen 2, large leuks.  Chest x-ray revealed no acute process.  EKG, per ER physician impression revealed normal sinus rhythm at a rate of 67 with nonspecific changes.  Vital signs were stable while in  "the ED.  She was given Rocephin and admitted to Dr. Vasquez.     10 point ROS negative except as noted above in HPI     Past medical history: As above  Past surgical history: Thyroidectomy, hysterectomy, PTCA/PCI with stent to RCA, AAA repair, cataract surgery  Social history: No history of smoking, alcohol abuse, or illicit drug use.  Lives with sister and nephew.  Ambulates with walker.  Family history: Father-stomach cancer; mother-myocardial infarction     Allergies  Pravastatin and Penicillins     Physical Exam  Constitutional:       Comments: Thin   HENT:      Head: Normocephalic and atraumatic.      Mouth/Throat:      Mouth: Mucous membranes are dry.   Eyes:      Conjunctiva/sclera: Conjunctivae normal.   Cardiovascular:      Rate and Rhythm: Normal rate and regular rhythm.      Heart sounds: Murmur heard.   Pulmonary:      Effort: Pulmonary effort is normal.      Breath sounds: No rales.   Abdominal:      General: Abdomen is flat. Bowel sounds are normal.      Palpations: Abdomen is soft.   Musculoskeletal:         General: Normal range of motion.      Cervical back: Neck supple.   Neurological:      Mental Status: She is alert. Mental status is at baseline.   Psychiatric:         Mood and Affect: Mood normal.            Last Recorded Vitals  Blood pressure (!) 155/97, pulse 77, temperature 36.5 °C (97.7 °F), temperature source Tympanic, resp. rate 25, height 1.727 m (5' 8\"), weight 51.3 kg (113 lb), SpO2 97 %.     Relevant Results     No current facility-administered medications on file prior to encounter.                  Current Outpatient Medications on File Prior to Encounter   Medication Sig Dispense Refill    amLODIPine (Norvasc) 10 mg tablet Take 1 tablet (10 mg) by mouth once daily.        anastrozole (Arimidex) 1 mg tablet Take 1 tablet (1 mg total) by mouth once daily.        aspirin 81 mg EC tablet Take 1 tablet (81 mg) by mouth once daily.        cholecalciferol (Vitamin D-3) 50 MCG (2000 UT) " tablet Take 1 tablet (2,000 Units) by mouth once daily.        cyanocobalamin (Vitamin B-12) 1,000 mcg tablet Take 1 tablet (1,000 mcg) by mouth once daily.        levothyroxine (Synthroid) 75 mcg tablet Take 1 tablet (75 mcg) by mouth once a day on Monday, Wednesday, and Friday.        atorvastatin (Lipitor) 20 mg tablet Take 1 tablet (20 mg) by mouth once daily.        levothyroxine (Synthroid, Levoxyl) 75 mcg tablet Take 0.5 tablets (37.5 mcg) by mouth once a day on Sunday, Tuesday, Thursday, and Saturday.                    Results for orders placed or performed during the hospital encounter of 11/26/23 (from the past 24 hour(s))   CBC and Auto Differential   Result Value Ref Range     WBC 7.6 4.4 - 11.3 x10*3/uL     nRBC 0.0 0.0 - 0.0 /100 WBCs     RBC 3.44 (L) 4.00 - 5.20 x10*6/uL     Hemoglobin 10.5 (L) 12.0 - 16.0 g/dL     Hematocrit 32.8 (L) 36.0 - 46.0 %     MCV 95 80 - 100 fL     MCH 30.5 26.0 - 34.0 pg     MCHC 32.0 32.0 - 36.0 g/dL     RDW 13.9 11.5 - 14.5 %     Platelets 277 150 - 450 x10*3/uL     Neutrophils % 78.5 40.0 - 80.0 %     Immature Granulocytes %, Automated 0.4 0.0 - 0.9 %     Lymphocytes % 9.7 13.0 - 44.0 %     Monocytes % 8.7 2.0 - 10.0 %     Eosinophils % 2.0 0.0 - 6.0 %     Basophils % 0.7 0.0 - 2.0 %     Neutrophils Absolute 5.98 (H) 1.60 - 5.50 x10*3/uL     Immature Granulocytes Absolute, Automated 0.03 0.00 - 0.50 x10*3/uL     Lymphocytes Absolute 0.74 (L) 0.80 - 3.00 x10*3/uL     Monocytes Absolute 0.66 0.05 - 0.80 x10*3/uL     Eosinophils Absolute 0.15 0.00 - 0.40 x10*3/uL     Basophils Absolute 0.05 0.00 - 0.10 x10*3/uL   Comprehensive metabolic panel   Result Value Ref Range     Glucose 102 (H) 74 - 99 mg/dL     Sodium 135 (L) 136 - 145 mmol/L     Potassium 4.5 3.5 - 5.3 mmol/L     Chloride 99 98 - 107 mmol/L     Bicarbonate 29 21 - 32 mmol/L     Anion Gap 12 10 - 20 mmol/L     Urea Nitrogen 26 (H) 6 - 23 mg/dL     Creatinine 0.91 0.50 - 1.05 mg/dL     eGFR 59 (L) >60  mL/min/1.73m*2     Calcium 9.3 8.6 - 10.3 mg/dL     Albumin 3.8 3.4 - 5.0 g/dL     Alkaline Phosphatase 73 33 - 136 U/L     Total Protein 7.3 6.4 - 8.2 g/dL     AST 20 9 - 39 U/L     Bilirubin, Total 0.4 0.0 - 1.2 mg/dL     ALT 11 7 - 45 U/L   Troponin I, High Sensitivity   Result Value Ref Range     Troponin I, High Sensitivity 20 (H) 0 - 13 ng/L   B-Type Natriuretic Peptide   Result Value Ref Range      (H) 0 - 99 pg/mL   Urinalysis with Reflex Microscopic and Culture   Result Value Ref Range     Color, Urine Yellow Straw, Yellow     Appearance, Urine Hazy (N) Clear     Specific Gravity, Urine 1.015 1.005 - 1.035     pH, Urine 5.0 5.0, 5.5, 6.0, 6.5, 7.0, 7.5, 8.0     Protein, Urine NEGATIVE NEGATIVE mg/dL     Glucose, Urine NEGATIVE NEGATIVE mg/dL     Blood, Urine MODERATE (2+) (A) NEGATIVE     Ketones, Urine NEGATIVE NEGATIVE mg/dL     Bilirubin, Urine NEGATIVE NEGATIVE     Urobilinogen, Urine 2.0 (N) <2.0 mg/dL     Nitrite, Urine NEGATIVE NEGATIVE     Leukocyte Esterase, Urine LARGE (3+) (A) NEGATIVE   Extra Urine Gray Tube   Result Value Ref Range     Extra Tube Hold for add-ons.     Microscopic Only, Urine   Result Value Ref Range     WBC, Urine 21-50 (A) 1-5, NONE /HPF     RBC, Urine 3-5 NONE, 1-2, 3-5 /HPF     Squamous Epithelial Cells, Urine 1-9 (SPARSE) Reference range not established. /HPF     Bacteria, Urine 1+ (A) NONE SEEN /HPF         XR chest 1 view     Result Date: 11/26/2023  Interpreted By:  Prosper Olivas, STUDY: XR CHEST 1 VIEW;  11/26/2023 10:54 am   INDICATION: Signs/Symptoms:Weakness.   COMPARISON: Portable chest, 7 September 2023   ACCESSION NUMBER(S): [id number]   ORDERING CLINICIAN: NICKY SPENCER   TECHNIQUE: Single frontal view of the chest; Portable technique   FINDINGS:   The cardiomediastinal silhouette is unchanged   No consolidative lung opacity   No edema / failure   No large pleural effusion or demonstrable pneumothorax        NO ACUTE DISEASE IN THE CHEST   MACRO: None    Signed by: Prosper Olivas 11/26/2023 11:18 AM Dictation workstation:   [id number]          Assessment/Plan   Principal Problem:    UTI (urinary tract infection)  Body Aches   Elevated troponin  Weakness     Admit to [facility]  Observation  Continue rocephin  urine culture pending      CBC, BMP in am  PT/OT  Repeat troponin and EKG     Chronic conditions: Hypertension, hyperlipidemia, hypothyroidism, coronary artery disease, PVD, breast cancer     Continue home medications as listed above     DVT prophylaxis     SCDs  Lovenox           I spent 45 minutes in the professional and overall care of this patient.     Patient fully evaluated plan as above.  Harmony Estrada, APRN-CNP                  Revision History    Patient fully evaluated on November 27 and Decadron orally added for myalgias.  Await urine culture continue IV Rocephin.  Recheck labs in AM.  Lidoderm patch to sacrum for pain        Principal Problem:    UTI (urinary tract infection)  Active Problems:    Generalized weakness              Patient fully evaluated on November 28.  Still with significant sacral pain.  X-rays to be ordered.  Continue present medications transition to oral antibiotics and monitor.        Patient fully evaluated on November 29.  Awaiting final urine sensitivity.  Formerly and sacrum noted on plain x-ray and orthopedic consultation pain.  CAT scan ordered at that area.  Neurology consultation for tremors.     Patient fully evaluated on November 30.  Results of CAT scan are noted and patient started on IV antibiotics pending further infectious disease and orthopedic recommendations.  Recheck labs in AM.     Patient fully evaluated on December 1.  Continue IV antibiotics.  His await orthopedic input if necessary.  Case discussed with infectious disease.  Recheck labs in AM.     Patient fully evaluated on December 2.  Continue present IV antibiotics.  Primidone added for tremors.  Await final cultures.  Recheck labs in AM.     Patient  fully evaluated on December 3.  Tremors have improved with primidone.  Await final recommendations from infectious disease and ST antibiotics on discharge.  Patient planning on discharge home with home care.     Patient fully evaluated on December 4.  Continue present IV antibiotics.  Discussed with patient tomorrow regarding.  She wants to do a 6-week course of IV antibiotics.  Patient does have recurrent sacral pain.  Varghese Vasquez MD                         Discharge Meds     Your medication list        START taking these medications        Instructions Last Dose Given Next Dose Due   acetaminophen 325 mg tablet  Commonly known as: Tylenol      Take 2 tablets (650 mg) by mouth every 4 hours if needed for fever (temp greater than 38.0 C) (greater than or equal to 38 C).       enoxaparin 30 mg/0.3 mL syringe  Commonly known as: Lovenox      Inject 0.3 mL (30 mg) under the skin once every 24 hours. Do not start before December 6, 2023.       lactobacillus acidophilus tablet tablet      Take 1 tablet by mouth 2 times a day.       meropenem 1 gram/100 mL IV  Commonly known as: Merrem      Infuse 100 mL (1,000 mg) at 200 mL/hr over 30 minutes into a venous catheter every 12 hours.       meropenem 1 gram injection  Commonly known as: Merrem      Infuse 20 mL (1 g) into a venous catheter every 12 hours. Q weekly labs - cbc, bmp, esr, crp; fax to [phone]       primidone 50 mg tablet  Commonly known as: Mysoline      Take 1 tablet (50 mg) by mouth 3 times a day.       vancomycin in dextrose 5 % IVPB  Commonly known as: Vancocin      Infuse 200 mL (1 g) at 200 mL/hr over 60 minutes into a venous catheter once every 24 hours. Do not start before December 6, 2023.       vancomycin 1250 mg/250 mL IV  Commonly known as: Vancocin      Infuse 250 mL (1,250 mg) at 200 mL/hr over 75 minutes into a venous catheter once every 24 hours. Do not start before December 7, 2023.              CONTINUE taking these medications         Instructions Last Dose Given Next Dose Due   amLODIPine 10 mg tablet  Commonly known as: Norvasc           anastrozole 1 mg tablet  Commonly known as: Arimidex           aspirin 81 mg EC tablet           atorvastatin 20 mg tablet  Commonly known as: Lipitor           cholecalciferol 50 MCG (2000 UT) tablet  Commonly known as: Vitamin D-3           cyanocobalamin 1,000 mcg tablet  Commonly known as: Vitamin B-12           levothyroxine 75 mcg tablet  Commonly known as: Synthroid, Levoxyl           Synthroid 75 mcg tablet  Generic drug: levothyroxine                     Where to Get Your Medications        These medications were sent to [facility] [address] [place] [address] [address] [facility]  [address] [facility] [address]      Phone: [phone]   acetaminophen 325 mg tablet       You can get these medications from any pharmacy    Bring a paper prescription for each of these medications  meropenem 1 gram injection       Information about where to get these medications is not yet available    Ask your nurse or doctor about these medications  enoxaparin 30 mg/0.3 mL syringe  lactobacillus acidophilus tablet tablet  meropenem 1 gram/100 mL IV  primidone 50 mg tablet  vancomycin 1250 mg/250 mL IV  vancomycin in dextrose 5 % IVPB         Test Results Pending At Discharge  Pending Labs       No current pending labs.            Hospital Course   Patient fully evaluated on December 5.  Clinically improved.  Will discharge to a skilled nursing facility on IV antibiotics.    Pertinent Physical Exam At Time of Discharge  Physical Exam    Outpatient Follow-Up  Future Appointments   Date Time Provider Department [place]   2/14/2024 10:00 AM [facility]  [facility]   2/21/2024 10:30 AM Tim Mancilla MD [id number] [place]     Patient fully evaluated on December 5.  Clinically improved.  Discharge on IV antibiotics for 6 weeks per recommendation of infectious disease.    Patient fully evaluated on  December 6.  Status post PICC placement awaiting discharge to [facility].    Patient fully evaluated on December 7.  Patient stable for discharge tremors have resolved with primidone.    Varghese Vasquez MD

## 2023-12-07 NOTE — NURSING NOTE
Report called over to Peyton at [facility], no further questions at this time. grace Lee, also called to update him on pts discharge. No further questions from Jesus.

## 2023-12-14 NOTE — DOCUMENTATION CLARIFICATION NOTE
"    PATIENT:               SKINNY SEGURA  ACCT #:                  [id number]  MRN:                       [id number]  :                       10/10/1930  ADMIT DATE:       2023 10:22 AM  DISCH DATE:        2023 8:10 PM  RESPONDING PROVIDER #:        [id number]          PROVIDER RESPONSE TEXT:    Mild Protein Calorie Malnutrition    CDI QUERY TEXT:    [facility]_Nutrition Diagnosis        Instruction:    Based on your assessment of the patient and the clinical information, please provide the requested documentation by clicking on the appropriate radio button and enter any additional information if prompted.    Question: Please further clarify this patient nutritional status as    When answering this query, please exercise your independent professional judgment. The fact that a question is being asked, does not imply that any particular answer is desired or expected.    The patient's clinical indicators include:  Clinical Information:  89 yr old female presented with generalized weakness, found to have UTI and COVID19.    Clinical Indicators:  23 Registered Dietitian Consult, ALLYSON Stevens, RD:      \"Malnutrition Diagnosis: Severe malnutrition related to starvation  As Evidenced by: Severe muscle and fat loss noted on physical exam.  Pt is 78% of her IBW with a BMI of 16.7    Nutrition Focused Physical Findings:  Orbital Fat Pads: Severe (dark circles, hollowing and loose skin)  Buccal Fat Pads: Severe (hollow, sunken and narrow face)  Triceps: Severe (negligible fat tissue)  Ribs: Severe (protruding prominent clavicle)\"      Treatment:  Plan per RD:  \"Individualized Nutrition Prescription Provided for : Continue bite sized texture diet per pt need, continue  ensure plus high protein X 2 to help meet nutritional needs.\"    Risk Factors:  age: 89; S5 Chronic Osteomyelitis  Options provided:  -- Mild Protein Calorie Malnutrition  -- Moderate Protein Calorie Malnutrition  -- Severe Protein Calorie Malnutrition  -- " Protein Calorie Malnutrition, Please specify severity  -- Other - I will add my own diagnosis  -- Refer to Clinical Documentation Reviewer    Query created by: Rhoda Arias on 12/13/2023 7:44 AM      Electronically signed by:  DEBBIE FLORES MD 12/14/2023 1:00 PM

## 2023-12-31 LAB
ATRIAL RATE: 67 BPM
P AXIS: 65 DEGREES
PR INTERVAL: 188 MS
Q ONSET: 249 MS
QRS COUNT: 11 BEATS
QRS DURATION: 94 MS
QT INTERVAL: 456 MS
QTC CALCULATION(BAZETT): 456 MS
QTC FREDERICIA: 456 MS
R AXIS: -40 DEGREES
T AXIS: 59 DEGREES
T OFFSET: 477 MS
VENTRICULAR RATE: 60 BPM

## 2024-01-01 LAB
ATRIAL RATE: 67 BPM
P AXIS: 64 DEGREES
P OFFSET: 164 MS
P ONSET: 113 MS
PR INTERVAL: 198 MS
Q ONSET: 212 MS
QRS COUNT: 11 BEATS
QRS DURATION: 78 MS
QT INTERVAL: 438 MS
QTC CALCULATION(BAZETT): 462 MS
QTC FREDERICIA: 454 MS
R AXIS: -62 DEGREES
T AXIS: 35 DEGREES
T OFFSET: 431 MS
VENTRICULAR RATE: 67 BPM

## 2024-01-08 PROBLEM — R68.89 UNINTENTIONAL WEIGHT CHANGE: Status: ACTIVE | Noted: 2024-01-08

## 2024-01-08 PROBLEM — M70.52 BURSITIS OF LEFT KNEE: Status: ACTIVE | Noted: 2024-01-08

## 2024-01-08 PROBLEM — Z22.322 MRSA CARRIER: Status: ACTIVE | Noted: 2024-01-08

## 2024-01-08 PROBLEM — K25.9 GASTRIC ULCER: Status: ACTIVE | Noted: 2024-01-08

## 2024-01-08 PROBLEM — K29.70 HELICOBACTER PYLORI GASTRITIS: Status: ACTIVE | Noted: 2024-01-08

## 2024-01-08 PROBLEM — R04.0 EPISTAXIS: Status: ACTIVE | Noted: 2024-01-08

## 2024-01-08 PROBLEM — K86.2 PANCREATIC CYST (HHS-HCC): Status: ACTIVE | Noted: 2024-01-08

## 2024-01-08 PROBLEM — D50.9 IRON DEFICIENCY ANEMIA: Status: ACTIVE | Noted: 2024-01-08

## 2024-01-08 PROBLEM — K59.09 CHRONIC CONSTIPATION: Status: ACTIVE | Noted: 2024-01-08

## 2024-01-08 PROBLEM — K86.9 PANCREAS DISORDER (HHS-HCC): Status: ACTIVE | Noted: 2017-08-14

## 2024-01-08 PROBLEM — E87.8 LOW BICARBONATE LEVEL: Status: ACTIVE | Noted: 2024-01-08

## 2024-01-08 PROBLEM — N63.42 SUBAREOLAR MASS OF LEFT BREAST: Status: ACTIVE | Noted: 2024-01-08

## 2024-01-08 PROBLEM — H91.90 HEARING IMPAIRED: Status: ACTIVE | Noted: 2024-01-08

## 2024-01-08 PROBLEM — I71.40 AAA (ABDOMINAL AORTIC ANEURYSM) (CMS-HCC): Status: ACTIVE | Noted: 2024-01-08

## 2024-01-08 PROBLEM — R06.1 INSPIRATORY STRIDOR: Status: ACTIVE | Noted: 2024-01-08

## 2024-01-08 PROBLEM — I35.0 AORTIC STENOSIS: Status: ACTIVE | Noted: 2024-01-08

## 2024-01-08 PROBLEM — R13.13 DYSPHAGIA, CRICOPHARYNGEAL: Status: ACTIVE | Noted: 2024-01-08

## 2024-01-08 PROBLEM — R63.6 UNDERWEIGHT ON EXAMINATION: Status: ACTIVE | Noted: 2024-01-08

## 2024-01-08 PROBLEM — E53.8 VITAMIN B 12 DEFICIENCY: Status: ACTIVE | Noted: 2024-01-08

## 2024-01-08 PROBLEM — C50.912 BREAST CANCER, LEFT (MULTI): Status: ACTIVE | Noted: 2024-01-08

## 2024-01-08 PROBLEM — R79.89 ABNORMAL LFTS (LIVER FUNCTION TESTS): Status: ACTIVE | Noted: 2024-01-08

## 2024-01-08 PROBLEM — E89.0 HX OF THYROIDECTOMY: Status: ACTIVE | Noted: 2024-01-08

## 2024-01-08 PROBLEM — N63.20 LUMP OF BREAST, LEFT: Status: ACTIVE | Noted: 2024-01-08

## 2024-01-08 PROBLEM — I25.118: Status: ACTIVE | Noted: 2024-01-08

## 2024-01-08 PROBLEM — R92.8 ABNORMAL MAMMOGRAM OF LEFT BREAST: Status: ACTIVE | Noted: 2024-01-08

## 2024-01-08 PROBLEM — L98.9 SKIN LESION: Status: ACTIVE | Noted: 2024-01-08

## 2024-01-08 PROBLEM — L02.11 NECK ABSCESS: Status: ACTIVE | Noted: 2024-01-08

## 2024-01-08 PROBLEM — I12.9 HYPERTENSIVE KIDNEY DISEASE WITH CKD STAGE III (MULTI): Status: ACTIVE | Noted: 2024-01-08

## 2024-01-08 PROBLEM — M81.0 OSTEOPOROSIS: Status: ACTIVE | Noted: 2024-01-08

## 2024-01-08 PROBLEM — E87.5 HYPERKALEMIA: Status: ACTIVE | Noted: 2024-01-08

## 2024-01-08 PROBLEM — H61.22 IMPACTED CERUMEN, LEFT EAR: Status: ACTIVE | Noted: 2024-01-08

## 2024-01-08 PROBLEM — G62.9 POLYNEUROPATHY: Status: ACTIVE | Noted: 2024-01-08

## 2024-01-08 PROBLEM — B96.81 HELICOBACTER PYLORI GASTRITIS: Status: ACTIVE | Noted: 2024-01-08

## 2024-01-08 PROBLEM — R39.11 URINARY HESITANCY: Status: ACTIVE | Noted: 2024-01-08

## 2024-01-08 PROBLEM — H81.11 BENIGN PAROXYSMAL POSITIONAL VERTIGO OF RIGHT EAR: Status: ACTIVE | Noted: 2024-01-08

## 2024-01-08 PROBLEM — K63.5 COLON POLYPS: Status: ACTIVE | Noted: 2024-01-08

## 2024-01-08 PROBLEM — T82.310A: Status: ACTIVE | Noted: 2024-01-08

## 2024-01-08 PROBLEM — D68.9 COAGULATION DISORDER (MULTI): Status: ACTIVE | Noted: 2024-01-08

## 2024-01-08 PROBLEM — D64.9 ANEMIA: Status: ACTIVE | Noted: 2024-01-08

## 2024-01-08 PROBLEM — I10 HTN (HYPERTENSION): Status: ACTIVE | Noted: 2024-01-08

## 2024-01-08 PROBLEM — I71.9 AORTIC ANEURYSM (CMS-HCC): Status: ACTIVE | Noted: 2017-08-14

## 2024-01-08 PROBLEM — H35.30 MACULAR DEGENERATION: Status: ACTIVE | Noted: 2024-01-08

## 2024-01-08 PROBLEM — M19.90 ARTHRITIS: Status: ACTIVE | Noted: 2024-01-08

## 2024-01-08 PROBLEM — I70.0 ATHEROSCLEROSIS OF AORTA (CMS-HCC): Status: ACTIVE | Noted: 2024-01-08

## 2024-01-08 PROBLEM — N20.0 KIDNEY STONES: Status: ACTIVE | Noted: 2024-01-08

## 2024-01-08 PROBLEM — R42 POSITIONAL LIGHTHEADEDNESS: Status: ACTIVE | Noted: 2024-01-08

## 2024-01-08 PROBLEM — I70.0 ABDOMINAL AORTIC ATHEROSCLEROSIS (CMS-HCC): Status: ACTIVE | Noted: 2024-01-08

## 2024-01-08 PROBLEM — R07.9 CHEST PAIN: Status: ACTIVE | Noted: 2024-01-08

## 2024-01-08 PROBLEM — N18.30 HYPERTENSIVE KIDNEY DISEASE WITH CKD STAGE III (MULTI): Status: ACTIVE | Noted: 2024-01-08

## 2024-01-08 PROBLEM — R23.4 ESCHAR OF LOWER LEG: Status: ACTIVE | Noted: 2024-01-08

## 2024-01-08 PROBLEM — J38.00 VOCAL CORD PARALYSIS: Status: ACTIVE | Noted: 2024-01-08

## 2024-01-08 PROBLEM — E78.00 HYPERCHOLESTEREMIA: Status: ACTIVE | Noted: 2024-01-08

## 2024-01-08 PROBLEM — I25.10 CORONARY ARTERY DISEASE: Status: ACTIVE | Noted: 2024-01-08

## 2024-01-08 PROBLEM — I71.43 ANEURYSM OF INFRARENAL ABDOMINAL AORTA (CMS-HCC): Status: ACTIVE | Noted: 2024-01-08

## 2024-01-08 PROBLEM — R20.8 BURNING SENSATION OF FEET: Status: ACTIVE | Noted: 2024-01-08

## 2024-01-08 PROBLEM — D49.0 IPMN (INTRADUCTAL PAPILLARY MUCINOUS NEOPLASM): Status: ACTIVE | Noted: 2024-01-08

## 2024-01-08 PROBLEM — R60.0 EDEMA OF BOTH LEGS: Status: ACTIVE | Noted: 2024-01-08

## 2024-01-09 ENCOUNTER — LAB REQUISITION (OUTPATIENT)
Dept: LAB | Facility: HOSPITAL | Age: 89
End: 2024-01-09
Payer: COMMERCIAL

## 2024-01-09 DIAGNOSIS — Z79.2 LONG TERM (CURRENT) USE OF ANTIBIOTICS: ICD-10-CM

## 2024-01-09 LAB — VANCOMYCIN TROUGH SERPL-MCNC: 13 UG/ML (ref 5–20)

## 2024-01-09 PROCEDURE — 80202 ASSAY OF VANCOMYCIN: CPT | Mod: OUT | Performed by: INTERNAL MEDICINE

## 2024-01-11 ENCOUNTER — LAB REQUISITION (OUTPATIENT)
Dept: LAB | Facility: HOSPITAL | Age: 89
End: 2024-01-11
Payer: COMMERCIAL

## 2024-01-11 DIAGNOSIS — Z51.81 ENCOUNTER FOR THERAPEUTIC DRUG LEVEL MONITORING: ICD-10-CM

## 2024-01-11 DIAGNOSIS — J18.8 OTHER PNEUMONIA, UNSPECIFIED ORGANISM: ICD-10-CM

## 2024-01-11 LAB
ANION GAP SERPL CALC-SCNC: 10 MMOL/L (ref 10–20)
BUN SERPL-MCNC: 28 MG/DL (ref 6–23)
CALCIUM SERPL-MCNC: 8.3 MG/DL (ref 8.6–10.3)
CHLORIDE SERPL-SCNC: 96 MMOL/L (ref 98–107)
CO2 SERPL-SCNC: 33 MMOL/L (ref 21–32)
CREAT SERPL-MCNC: 0.75 MG/DL (ref 0.5–1.05)
CREAT SERPL-MCNC: 0.75 MG/DL (ref 0.5–1.05)
EGFRCR SERPLBLD CKD-EPI 2021: 74 ML/MIN/1.73M*2
EGFRCR SERPLBLD CKD-EPI 2021: 74 ML/MIN/1.73M*2
ERYTHROCYTE [DISTWIDTH] IN BLOOD BY AUTOMATED COUNT: 14.1 % (ref 11.5–14.5)
GLUCOSE SERPL-MCNC: 88 MG/DL (ref 74–99)
HCT VFR BLD AUTO: 26.9 % (ref 36–46)
HGB BLD-MCNC: 8.8 G/DL (ref 12–16)
MCH RBC QN AUTO: 29.7 PG (ref 26–34)
MCHC RBC AUTO-ENTMCNC: 32.7 G/DL (ref 32–36)
MCV RBC AUTO: 91 FL (ref 80–100)
NRBC BLD-RTO: 0 /100 WBCS (ref 0–0)
PLATELET # BLD AUTO: 263 X10*3/UL (ref 150–450)
POTASSIUM SERPL-SCNC: 4.6 MMOL/L (ref 3.5–5.3)
RBC # BLD AUTO: 2.96 X10*6/UL (ref 4–5.2)
SODIUM SERPL-SCNC: 134 MMOL/L (ref 136–145)
WBC # BLD AUTO: 9 X10*3/UL (ref 4.4–11.3)

## 2024-01-11 PROCEDURE — 82565 ASSAY OF CREATININE: CPT | Mod: OUT | Performed by: INTERNAL MEDICINE

## 2024-01-11 PROCEDURE — 85027 COMPLETE CBC AUTOMATED: CPT | Mod: OUT | Performed by: INTERNAL MEDICINE

## 2024-01-11 PROCEDURE — 82565 ASSAY OF CREATININE: CPT | Mod: 59,OUT | Performed by: INTERNAL MEDICINE

## 2024-01-12 ENCOUNTER — LAB REQUISITION (OUTPATIENT)
Dept: LAB | Facility: HOSPITAL | Age: 89
End: 2024-01-12
Payer: COMMERCIAL

## 2024-01-12 DIAGNOSIS — N39.0 URINARY TRACT INFECTION, SITE NOT SPECIFIED: ICD-10-CM

## 2024-01-12 LAB
ANION GAP SERPL CALC-SCNC: 11 MMOL/L (ref 10–20)
BUN SERPL-MCNC: 28 MG/DL (ref 6–23)
CALCIUM SERPL-MCNC: 8.3 MG/DL (ref 8.6–10.3)
CHLORIDE SERPL-SCNC: 93 MMOL/L (ref 98–107)
CO2 SERPL-SCNC: 33 MMOL/L (ref 21–32)
CREAT SERPL-MCNC: 0.87 MG/DL (ref 0.5–1.05)
EGFRCR SERPLBLD CKD-EPI 2021: 62 ML/MIN/1.73M*2
ERYTHROCYTE [DISTWIDTH] IN BLOOD BY AUTOMATED COUNT: 14.4 % (ref 11.5–14.5)
GLUCOSE SERPL-MCNC: 134 MG/DL (ref 74–99)
HCT VFR BLD AUTO: 25.9 % (ref 36–46)
HGB BLD-MCNC: 8.3 G/DL (ref 12–16)
MCH RBC QN AUTO: 29.1 PG (ref 26–34)
MCHC RBC AUTO-ENTMCNC: 32 G/DL (ref 32–36)
MCV RBC AUTO: 91 FL (ref 80–100)
NRBC BLD-RTO: 0 /100 WBCS (ref 0–0)
PLATELET # BLD AUTO: 261 X10*3/UL (ref 150–450)
POTASSIUM SERPL-SCNC: 4.5 MMOL/L (ref 3.5–5.3)
RBC # BLD AUTO: 2.85 X10*6/UL (ref 4–5.2)
SODIUM SERPL-SCNC: 132 MMOL/L (ref 136–145)
VANCOMYCIN TROUGH SERPL-MCNC: 14.5 UG/ML (ref 5–20)
WBC # BLD AUTO: 9.3 X10*3/UL (ref 4.4–11.3)

## 2024-01-12 PROCEDURE — 82374 ASSAY BLOOD CARBON DIOXIDE: CPT | Mod: OUT | Performed by: INTERNAL MEDICINE

## 2024-01-12 PROCEDURE — 80202 ASSAY OF VANCOMYCIN: CPT | Mod: OUT | Performed by: INTERNAL MEDICINE

## 2024-01-12 PROCEDURE — 85027 COMPLETE CBC AUTOMATED: CPT | Mod: OUT | Performed by: INTERNAL MEDICINE

## 2024-01-15 ENCOUNTER — LAB REQUISITION (OUTPATIENT)
Dept: LAB | Facility: HOSPITAL | Age: 89
End: 2024-01-15
Payer: COMMERCIAL

## 2024-01-15 DIAGNOSIS — M86.9 OSTEOMYELITIS, UNSPECIFIED (MULTI): ICD-10-CM

## 2024-01-15 LAB
ANION GAP SERPL CALC-SCNC: 9 MMOL/L (ref 10–20)
BUN SERPL-MCNC: 29 MG/DL (ref 6–23)
CALCIUM SERPL-MCNC: 8.6 MG/DL (ref 8.6–10.3)
CHLORIDE SERPL-SCNC: 95 MMOL/L (ref 98–107)
CO2 SERPL-SCNC: 34 MMOL/L (ref 21–32)
CREAT SERPL-MCNC: 0.7 MG/DL (ref 0.5–1.05)
CRP SERPL-MCNC: 2.66 MG/DL
EGFRCR SERPLBLD CKD-EPI 2021: 81 ML/MIN/1.73M*2
ERYTHROCYTE [DISTWIDTH] IN BLOOD BY AUTOMATED COUNT: 14.4 % (ref 11.5–14.5)
ERYTHROCYTE [SEDIMENTATION RATE] IN BLOOD BY WESTERGREN METHOD: 10 MM/H (ref 0–30)
GLUCOSE SERPL-MCNC: 87 MG/DL (ref 74–99)
HCT VFR BLD AUTO: 27.1 % (ref 36–46)
HGB BLD-MCNC: 8.9 G/DL (ref 12–16)
MCH RBC QN AUTO: 29.4 PG (ref 26–34)
MCHC RBC AUTO-ENTMCNC: 32.8 G/DL (ref 32–36)
MCV RBC AUTO: 89 FL (ref 80–100)
NRBC BLD-RTO: 0 /100 WBCS (ref 0–0)
PLATELET # BLD AUTO: 303 X10*3/UL (ref 150–450)
POTASSIUM SERPL-SCNC: 4.2 MMOL/L (ref 3.5–5.3)
RBC # BLD AUTO: 3.03 X10*6/UL (ref 4–5.2)
SODIUM SERPL-SCNC: 134 MMOL/L (ref 136–145)
WBC # BLD AUTO: 12.2 X10*3/UL (ref 4.4–11.3)

## 2024-01-15 PROCEDURE — 85027 COMPLETE CBC AUTOMATED: CPT | Mod: OUT | Performed by: INTERNAL MEDICINE

## 2024-01-15 PROCEDURE — 80048 BASIC METABOLIC PNL TOTAL CA: CPT | Mod: OUT | Performed by: INTERNAL MEDICINE

## 2024-01-15 PROCEDURE — 85652 RBC SED RATE AUTOMATED: CPT | Mod: OUT | Performed by: INTERNAL MEDICINE

## 2024-01-15 PROCEDURE — 86140 C-REACTIVE PROTEIN: CPT | Mod: OUT | Performed by: INTERNAL MEDICINE

## 2024-01-16 ENCOUNTER — LAB REQUISITION (OUTPATIENT)
Dept: LAB | Facility: HOSPITAL | Age: 89
End: 2024-01-16
Payer: COMMERCIAL

## 2024-01-16 DIAGNOSIS — N39.0 URINARY TRACT INFECTION, SITE NOT SPECIFIED: ICD-10-CM

## 2024-01-16 LAB
ANION GAP SERPL CALC-SCNC: 8 MMOL/L (ref 10–20)
BUN SERPL-MCNC: 24 MG/DL (ref 6–23)
CALCIUM SERPL-MCNC: 8.4 MG/DL (ref 8.6–10.3)
CHLORIDE SERPL-SCNC: 96 MMOL/L (ref 98–107)
CO2 SERPL-SCNC: 35 MMOL/L (ref 21–32)
CREAT SERPL-MCNC: 0.64 MG/DL (ref 0.5–1.05)
CRP SERPL-MCNC: 2.07 MG/DL
EGFRCR SERPLBLD CKD-EPI 2021: 83 ML/MIN/1.73M*2
ERYTHROCYTE [DISTWIDTH] IN BLOOD BY AUTOMATED COUNT: 14.4 % (ref 11.5–14.5)
ERYTHROCYTE [SEDIMENTATION RATE] IN BLOOD BY WESTERGREN METHOD: 5 MM/H (ref 0–30)
GLUCOSE SERPL-MCNC: 86 MG/DL (ref 74–99)
HCT VFR BLD AUTO: 25.8 % (ref 36–46)
HGB BLD-MCNC: 8.4 G/DL (ref 12–16)
MCH RBC QN AUTO: 29.4 PG (ref 26–34)
MCHC RBC AUTO-ENTMCNC: 32.6 G/DL (ref 32–36)
MCV RBC AUTO: 90 FL (ref 80–100)
NRBC BLD-RTO: 0 /100 WBCS (ref 0–0)
PLATELET # BLD AUTO: 298 X10*3/UL (ref 150–450)
POTASSIUM SERPL-SCNC: 4.5 MMOL/L (ref 3.5–5.3)
RBC # BLD AUTO: 2.86 X10*6/UL (ref 4–5.2)
SODIUM SERPL-SCNC: 134 MMOL/L (ref 136–145)
VANCOMYCIN SERPL-MCNC: 19 UG/ML (ref 5–20)
WBC # BLD AUTO: 10 X10*3/UL (ref 4.4–11.3)

## 2024-01-16 PROCEDURE — 85652 RBC SED RATE AUTOMATED: CPT | Mod: OUT | Performed by: INTERNAL MEDICINE

## 2024-01-16 PROCEDURE — 82374 ASSAY BLOOD CARBON DIOXIDE: CPT | Mod: OUT | Performed by: INTERNAL MEDICINE

## 2024-01-16 PROCEDURE — 85027 COMPLETE CBC AUTOMATED: CPT | Mod: OUT | Performed by: INTERNAL MEDICINE

## 2024-01-16 PROCEDURE — 86140 C-REACTIVE PROTEIN: CPT | Mod: OUT | Performed by: INTERNAL MEDICINE

## 2024-01-16 PROCEDURE — 80202 ASSAY OF VANCOMYCIN: CPT | Mod: OUT | Performed by: INTERNAL MEDICINE

## 2024-01-17 ENCOUNTER — LAB REQUISITION (OUTPATIENT)
Dept: LAB | Facility: HOSPITAL | Age: 89
End: 2024-01-17
Payer: COMMERCIAL

## 2024-01-17 DIAGNOSIS — N39.0 URINARY TRACT INFECTION, SITE NOT SPECIFIED: ICD-10-CM

## 2024-01-17 LAB
ANION GAP SERPL CALC-SCNC: 12 MMOL/L (ref 10–20)
BUN SERPL-MCNC: 33 MG/DL (ref 6–23)
CALCIUM SERPL-MCNC: 8.5 MG/DL (ref 8.6–10.3)
CHLORIDE SERPL-SCNC: 94 MMOL/L (ref 98–107)
CO2 SERPL-SCNC: 32 MMOL/L (ref 21–32)
CREAT SERPL-MCNC: 0.73 MG/DL (ref 0.5–1.05)
EGFRCR SERPLBLD CKD-EPI 2021: 77 ML/MIN/1.73M*2
ERYTHROCYTE [DISTWIDTH] IN BLOOD BY AUTOMATED COUNT: 14.3 % (ref 11.5–14.5)
GLUCOSE SERPL-MCNC: 78 MG/DL (ref 74–99)
HCT VFR BLD AUTO: 24.5 % (ref 36–46)
HGB BLD-MCNC: 7.9 G/DL (ref 12–16)
MCH RBC QN AUTO: 29.2 PG (ref 26–34)
MCHC RBC AUTO-ENTMCNC: 32.2 G/DL (ref 32–36)
MCV RBC AUTO: 90 FL (ref 80–100)
NRBC BLD-RTO: 0 /100 WBCS (ref 0–0)
PLATELET # BLD AUTO: 295 X10*3/UL (ref 150–450)
POTASSIUM SERPL-SCNC: 5.1 MMOL/L (ref 3.5–5.3)
RBC # BLD AUTO: 2.71 X10*6/UL (ref 4–5.2)
SODIUM SERPL-SCNC: 133 MMOL/L (ref 136–145)
WBC # BLD AUTO: 7.5 X10*3/UL (ref 4.4–11.3)

## 2024-01-17 PROCEDURE — 80048 BASIC METABOLIC PNL TOTAL CA: CPT | Mod: OUT | Performed by: INTERNAL MEDICINE

## 2024-01-17 PROCEDURE — 85027 COMPLETE CBC AUTOMATED: CPT | Mod: OUT | Performed by: INTERNAL MEDICINE

## 2024-01-19 ENCOUNTER — LAB REQUISITION (OUTPATIENT)
Dept: LAB | Facility: HOSPITAL | Age: 89
End: 2024-01-19
Payer: COMMERCIAL

## 2024-01-19 DIAGNOSIS — D64.9 ANEMIA, UNSPECIFIED: ICD-10-CM

## 2024-01-19 DIAGNOSIS — N39.0 URINARY TRACT INFECTION, SITE NOT SPECIFIED: ICD-10-CM

## 2024-01-19 LAB
ANION GAP SERPL CALC-SCNC: 9 MMOL/L (ref 10–20)
BUN SERPL-MCNC: 34 MG/DL (ref 6–23)
CALCIUM SERPL-MCNC: 8.3 MG/DL (ref 8.6–10.3)
CHLORIDE SERPL-SCNC: 96 MMOL/L (ref 98–107)
CO2 SERPL-SCNC: 34 MMOL/L (ref 21–32)
CREAT SERPL-MCNC: 0.63 MG/DL (ref 0.5–1.05)
EGFRCR SERPLBLD CKD-EPI 2021: 83 ML/MIN/1.73M*2
ERYTHROCYTE [DISTWIDTH] IN BLOOD BY AUTOMATED COUNT: 14.8 % (ref 11.5–14.5)
GLUCOSE SERPL-MCNC: 83 MG/DL (ref 74–99)
HCT VFR BLD AUTO: 23.9 % (ref 36–46)
HGB BLD-MCNC: 7.7 G/DL (ref 12–16)
MCH RBC QN AUTO: 29.1 PG (ref 26–34)
MCHC RBC AUTO-ENTMCNC: 32.2 G/DL (ref 32–36)
MCV RBC AUTO: 90 FL (ref 80–100)
NRBC BLD-RTO: 0 /100 WBCS (ref 0–0)
PLATELET # BLD AUTO: 268 X10*3/UL (ref 150–450)
POTASSIUM SERPL-SCNC: 4.7 MMOL/L (ref 3.5–5.3)
RBC # BLD AUTO: 2.65 X10*6/UL (ref 4–5.2)
SODIUM SERPL-SCNC: 134 MMOL/L (ref 136–145)
WBC # BLD AUTO: 8.6 X10*3/UL (ref 4.4–11.3)

## 2024-01-19 PROCEDURE — 85027 COMPLETE CBC AUTOMATED: CPT | Mod: OUT | Performed by: INTERNAL MEDICINE

## 2024-01-19 PROCEDURE — 80048 BASIC METABOLIC PNL TOTAL CA: CPT | Mod: OUT | Performed by: INTERNAL MEDICINE

## 2024-01-22 ENCOUNTER — LAB REQUISITION (OUTPATIENT)
Dept: LAB | Facility: HOSPITAL | Age: 89
End: 2024-01-22
Payer: COMMERCIAL

## 2024-01-22 DIAGNOSIS — M86.9 OSTEOMYELITIS, UNSPECIFIED (MULTI): ICD-10-CM

## 2024-01-22 LAB
ANION GAP SERPL CALC-SCNC: 12 MMOL/L (ref 10–20)
BUN SERPL-MCNC: 36 MG/DL (ref 6–23)
CALCIUM SERPL-MCNC: 8.5 MG/DL (ref 8.6–10.3)
CHLORIDE SERPL-SCNC: 93 MMOL/L (ref 98–107)
CO2 SERPL-SCNC: 33 MMOL/L (ref 21–32)
CREAT SERPL-MCNC: 0.66 MG/DL (ref 0.5–1.05)
CRP SERPL-MCNC: 0.56 MG/DL
EGFRCR SERPLBLD CKD-EPI 2021: 82 ML/MIN/1.73M*2
ERYTHROCYTE [DISTWIDTH] IN BLOOD BY AUTOMATED COUNT: 15.6 % (ref 11.5–14.5)
ERYTHROCYTE [SEDIMENTATION RATE] IN BLOOD BY WESTERGREN METHOD: 3 MM/H (ref 0–30)
GLUCOSE SERPL-MCNC: 118 MG/DL (ref 74–99)
HCT VFR BLD AUTO: 24.3 % (ref 36–46)
HGB BLD-MCNC: 7.7 G/DL (ref 12–16)
MCH RBC QN AUTO: 28.9 PG (ref 26–34)
MCHC RBC AUTO-ENTMCNC: 31.7 G/DL (ref 32–36)
MCV RBC AUTO: 91 FL (ref 80–100)
NRBC BLD-RTO: 0 /100 WBCS (ref 0–0)
PLATELET # BLD AUTO: 311 X10*3/UL (ref 150–450)
POTASSIUM SERPL-SCNC: 5 MMOL/L (ref 3.5–5.3)
RBC # BLD AUTO: 2.66 X10*6/UL (ref 4–5.2)
SODIUM SERPL-SCNC: 133 MMOL/L (ref 136–145)
WBC # BLD AUTO: 10.1 X10*3/UL (ref 4.4–11.3)

## 2024-01-22 PROCEDURE — 85652 RBC SED RATE AUTOMATED: CPT | Mod: OUT | Performed by: INTERNAL MEDICINE

## 2024-01-22 PROCEDURE — 86140 C-REACTIVE PROTEIN: CPT | Mod: OUT | Performed by: INTERNAL MEDICINE

## 2024-01-22 PROCEDURE — 82374 ASSAY BLOOD CARBON DIOXIDE: CPT | Mod: OUT | Performed by: INTERNAL MEDICINE

## 2024-01-22 PROCEDURE — 85027 COMPLETE CBC AUTOMATED: CPT | Mod: OUT | Performed by: INTERNAL MEDICINE

## 2024-01-24 ENCOUNTER — HOSPITAL ENCOUNTER (EMERGENCY)
Facility: HOSPITAL | Age: 89
Discharge: HOME | End: 2024-01-25
Payer: COMMERCIAL

## 2024-01-24 ENCOUNTER — APPOINTMENT (OUTPATIENT)
Dept: RADIOLOGY | Facility: HOSPITAL | Age: 89
End: 2024-01-24
Payer: COMMERCIAL

## 2024-01-24 DIAGNOSIS — M25.461 KNEE EFFUSION, RIGHT: Primary | ICD-10-CM

## 2024-01-24 DIAGNOSIS — M25.561 ACUTE PAIN OF RIGHT KNEE: ICD-10-CM

## 2024-01-24 PROCEDURE — 2500000004 HC RX 250 GENERAL PHARMACY W/ HCPCS (ALT 636 FOR OP/ED): Performed by: NURSE PRACTITIONER

## 2024-01-24 PROCEDURE — 73564 X-RAY EXAM KNEE 4 OR MORE: CPT | Mod: RT

## 2024-01-24 PROCEDURE — 99284 EMERGENCY DEPT VISIT MOD MDM: CPT | Mod: 25 | Performed by: NURSE PRACTITIONER

## 2024-01-24 PROCEDURE — 99285 EMERGENCY DEPT VISIT HI MDM: CPT

## 2024-01-24 PROCEDURE — 73564 X-RAY EXAM KNEE 4 OR MORE: CPT | Mod: RIGHT SIDE | Performed by: RADIOLOGY

## 2024-01-24 PROCEDURE — 96374 THER/PROPH/DIAG INJ IV PUSH: CPT | Performed by: NURSE PRACTITIONER

## 2024-01-24 RX ORDER — ACETAMINOPHEN 500 MG
1000 TABLET ORAL EVERY 6 HOURS PRN
Qty: 20 TABLET | Refills: 0 | Status: SHIPPED | OUTPATIENT
Start: 2024-01-24

## 2024-01-24 RX ORDER — MORPHINE SULFATE 2 MG/ML
INJECTION, SOLUTION INTRAMUSCULAR; INTRAVENOUS
Status: DISCONTINUED
Start: 2024-01-24 | End: 2024-01-25 | Stop reason: HOSPADM

## 2024-01-24 RX ORDER — MORPHINE SULFATE 2 MG/ML
2 INJECTION, SOLUTION INTRAMUSCULAR; INTRAVENOUS ONCE
Status: COMPLETED | OUTPATIENT
Start: 2024-01-24 | End: 2024-01-24

## 2024-01-24 RX ADMIN — MORPHINE SULFATE 2 MG: 2 INJECTION, SOLUTION INTRAMUSCULAR; INTRAVENOUS at 20:05

## 2024-01-24 ASSESSMENT — PAIN - FUNCTIONAL ASSESSMENT: PAIN_FUNCTIONAL_ASSESSMENT: 0-10

## 2024-01-24 ASSESSMENT — PAIN DESCRIPTION - ORIENTATION: ORIENTATION: RIGHT

## 2024-01-24 ASSESSMENT — LIFESTYLE VARIABLES
REASON UNABLE TO ASSESS: YES
EVER FELT BAD OR GUILTY ABOUT YOUR DRINKING: NO
HAVE PEOPLE ANNOYED YOU BY CRITICIZING YOUR DRINKING: NO
EVER HAD A DRINK FIRST THING IN THE MORNING TO STEADY YOUR NERVES TO GET RID OF A HANGOVER: NO
HAVE YOU EVER FELT YOU SHOULD CUT DOWN ON YOUR DRINKING: NO

## 2024-01-24 ASSESSMENT — PAIN DESCRIPTION - LOCATION: LOCATION: KNEE

## 2024-01-24 ASSESSMENT — COLUMBIA-SUICIDE SEVERITY RATING SCALE - C-SSRS
6. HAVE YOU EVER DONE ANYTHING, STARTED TO DO ANYTHING, OR PREPARED TO DO ANYTHING TO END YOUR LIFE?: NO
1. IN THE PAST MONTH, HAVE YOU WISHED YOU WERE DEAD OR WISHED YOU COULD GO TO SLEEP AND NOT WAKE UP?: NO
2. HAVE YOU ACTUALLY HAD ANY THOUGHTS OF KILLING YOURSELF?: NO

## 2024-01-24 ASSESSMENT — PAIN SCALES - GENERAL
PAINLEVEL_OUTOF10: 10 - WORST POSSIBLE PAIN
PAINLEVEL_OUTOF10: 0 - NO PAIN

## 2024-01-25 ENCOUNTER — LAB REQUISITION (OUTPATIENT)
Dept: LAB | Facility: HOSPITAL | Age: 89
End: 2024-01-25
Payer: MEDICARE

## 2024-01-25 VITALS
WEIGHT: 139.33 LBS | SYSTOLIC BLOOD PRESSURE: 118 MMHG | DIASTOLIC BLOOD PRESSURE: 60 MMHG | BODY MASS INDEX: 20.57 KG/M2 | HEART RATE: 60 BPM | RESPIRATION RATE: 18 BRPM | OXYGEN SATURATION: 96 % | TEMPERATURE: 98.7 F

## 2024-01-25 DIAGNOSIS — D64.9 ANEMIA, UNSPECIFIED: ICD-10-CM

## 2024-01-25 LAB
ANION GAP SERPL CALC-SCNC: 11 MMOL/L (ref 10–20)
BUN SERPL-MCNC: 40 MG/DL (ref 6–23)
CALCIUM SERPL-MCNC: 8.2 MG/DL (ref 8.6–10.3)
CHLORIDE SERPL-SCNC: 91 MMOL/L (ref 98–107)
CO2 SERPL-SCNC: 34 MMOL/L (ref 21–32)
CREAT SERPL-MCNC: 1.02 MG/DL (ref 0.5–1.05)
EGFRCR SERPLBLD CKD-EPI 2021: 51 ML/MIN/1.73M*2
ERYTHROCYTE [DISTWIDTH] IN BLOOD BY AUTOMATED COUNT: 16.2 % (ref 11.5–14.5)
GLUCOSE SERPL-MCNC: 101 MG/DL (ref 74–99)
HCT VFR BLD AUTO: 24.7 % (ref 36–46)
HGB BLD-MCNC: 8.2 G/DL (ref 12–16)
MCH RBC QN AUTO: 29.9 PG (ref 26–34)
MCHC RBC AUTO-ENTMCNC: 33.2 G/DL (ref 32–36)
MCV RBC AUTO: 90 FL (ref 80–100)
NRBC BLD-RTO: 0 /100 WBCS (ref 0–0)
PLATELET # BLD AUTO: 270 X10*3/UL (ref 150–450)
POTASSIUM SERPL-SCNC: 4.2 MMOL/L (ref 3.5–5.3)
RBC # BLD AUTO: 2.74 X10*6/UL (ref 4–5.2)
SODIUM SERPL-SCNC: 132 MMOL/L (ref 136–145)
WBC # BLD AUTO: 10.9 X10*3/UL (ref 4.4–11.3)

## 2024-01-25 PROCEDURE — 80048 BASIC METABOLIC PNL TOTAL CA: CPT | Mod: OUT | Performed by: INTERNAL MEDICINE

## 2024-01-25 PROCEDURE — 85027 COMPLETE CBC AUTOMATED: CPT | Mod: OUT | Performed by: INTERNAL MEDICINE

## 2024-01-25 RX ORDER — ACETAMINOPHEN 325 MG/1
975 TABLET ORAL ONCE
Status: COMPLETED | OUTPATIENT
Start: 2024-01-25 | End: 2024-01-25

## 2024-01-25 RX ADMIN — ACETAMINOPHEN 975 MG: 325 TABLET ORAL at 02:23

## 2024-01-25 ASSESSMENT — PAIN SCALES - GENERAL: PAINLEVEL_OUTOF10: 0 - NO PAIN

## 2024-01-25 ASSESSMENT — PAIN - FUNCTIONAL ASSESSMENT: PAIN_FUNCTIONAL_ASSESSMENT: 0-10

## 2024-01-25 NOTE — ED PROVIDER NOTES
HPI   Chief Complaint   Patient presents with    Knee Pain     Pt arrived from [facility] via EMS with c/o R knee pain and swelling, report Pt did not fall, may have twisted it 2 days ago.       Patient is uncomfortable appearing 89-year-old female with past medical history of colonic polyps, malignant neoplasm of colon, nonrheumatic mitral regurgitation, generalized weakness, abdominal aortic aneurysm, aortic stenosis, arthritis, benign paroxysmal positional vertigo, breast cancer, coagulation disorder, constipation, gastric ulcer, esophageal reflux, hypertension, hyperlipidemia, iron deficiency anemia, acute kidney injury, presents the emergency today for complaint of right knee pain began 2 days ago after bed change.  Patient denies injuries trauma or falls but continues to complain of right knee pain.  Patient denies radiation of pain down to the ankle or up to the hip.  Pain is at bedside and states patient resides in nursing home and during one of the bed changes twisted the patient's right knee and was concerned may be experiencing pain due to this.  Patient denies any chest pain shortness of breath difficulty breathing, calf pain, abdominal pain with nausea vomiting or diarrhea or constipation, fever, shaking, or chills.                          Marcell Coma Scale Score: 14                  Patient History   Past Medical History:   Diagnosis Date    Abnormal findings on diagnostic imaging of other abdominal regions, including retroperitoneum     Abnormal abdominal ultrasound    Abnormal findings on diagnostic imaging of other abdominal regions, including retroperitoneum     Abnormal CT of the abdomen    Abnormal findings on diagnostic imaging of other specified body structures     Abnormal MRI    Abnormal findings on diagnostic imaging of other specified body structures     Abnormal ultrasound    Old myocardial infarction 12/09/2019    History of myocardial infarction    Personal history of  other diseases of the nervous system and sense organs     History of cataract    Personal history of other medical treatment     History of mammogram    Personal history of other medical treatment     History of echocardiogram    Personal history of other specified conditions     History of heartburn    Personal history of urinary calculi 12/09/2019    History of renal calculi     Past Surgical History:   Procedure Laterality Date    CT ABDOMEN PELVIS ANGIOGRAM W AND/OR WO IV CONTRAST  12/9/2019    CT ABDOMEN PELVIS ANGIOGRAM W AND/OR WO IV CONTRAST 12/9/2019 [facility]    CT ABDOMEN PELVIS ANGIOGRAM W AND/OR WO IV CONTRAST  12/16/2021    CT ABDOMEN PELVIS ANGIOGRAM W AND/OR WO IV CONTRAST 12/16/2021 [facility]    CT ABDOMEN PELVIS ANGIOGRAM W AND/OR WO IV CONTRAST  8/15/2022    CT ABDOMEN PELVIS ANGIOGRAM W AND/OR WO IV CONTRAST 8/15/2022 [facility]    OTHER SURGICAL HISTORY  12/09/2019    Complete colonoscopy    OTHER SURGICAL HISTORY  12/09/2019    Hysterectomy    OTHER SURGICAL HISTORY  12/09/2019    Thyroidectomy    OTHER SURGICAL HISTORY  12/09/2019    Cataract surgery    OTHER SURGICAL HISTORY  01/13/2020    Cardiac catheterization with stent placement    OTHER SURGICAL HISTORY  01/13/2020    Cardiac catheterization    OTHER SURGICAL HISTORY  08/04/2020    Endoscopy    OTHER SURGICAL HISTORY  12/09/2019    Tonsillectomy     No family history on file.  Social History     Tobacco Use    Smoking status: Never    Smokeless tobacco: Never   Vaping Use    Vaping Use: Never used   Substance Use Topics    Alcohol use: Never    Drug use: Never       Physical Exam   ED Triage Vitals [01/24/24 1948]   Temperature Heart Rate Respirations BP   37.2 °C (98.9 °F) 71 (!) 24 116/56      Pulse Ox Temp Source Heart Rate Source Patient Position   95 % Tympanic -- Sitting      BP Location FiO2 (%)     Left arm --       Physical Exam  Vitals and nursing note reviewed.   Constitutional:       General:  She is not in acute distress.     Appearance: Normal appearance. She is not ill-appearing, toxic-appearing or diaphoretic.   HENT:      Head: Normocephalic.   Eyes:      General:         Right eye: No discharge.         Left eye: No discharge.      Extraocular Movements: Extraocular movements intact.      Pupils: Pupils are equal, round, and reactive to light.   Cardiovascular:      Rate and Rhythm: Normal rate and regular rhythm.      Pulses: Normal pulses.      Heart sounds: Normal heart sounds. No murmur heard.     No friction rub. No gallop.   Pulmonary:      Effort: Pulmonary effort is normal. No respiratory distress.      Breath sounds: Normal breath sounds. No stridor. No wheezing, rhonchi or rales.   Chest:      Chest wall: No tenderness.   Musculoskeletal:         General: Swelling and tenderness present. No deformity or signs of injury. Normal range of motion.      Cervical back: Normal range of motion and neck supple.      Right lower leg: No edema.      Left lower leg: No edema.      Comments: Diffuse tenderness upon palpation to the right knee, moderate amount of edema present, cap refill less than 3 seconds, DP PT pulse are strong and regular   Skin:     General: Skin is warm.      Capillary Refill: Capillary refill takes less than 2 seconds.      Coloration: Skin is not jaundiced or pale.      Findings: No bruising, erythema, lesion or rash.   Neurological:      General: No focal deficit present.      Mental Status: She is alert and oriented to person, place, and time.         ED Course & MDM   ED Course as of 01/24/24 2206 Wed Jan 24, 2024 2059 X-ray of the knee reveals  IMPRESSION:  1. No acute fracture seen radiographically. If there is persistent  clinical concern however CT can be performed for complete evaluation.  2. Large suprapatellar joint effusion with large intra-articular  ossific bodies.  3. Moder. If ate tricompartmental osteoarthritis   [EC]      ED Course User Index  [EC] Phil CROUCH  NEFTALY Haile         Diagnoses as of 01/24/24 2206   Knee effusion, right   Acute pain of right knee       Medical Decision Making  Given patient's pain presentation a thorough exam was performed.  Patient has Diffuse tenderness upon palpation to the right knee, moderate amount of edema present, cap refill less than 3 seconds, DP PT pulse are strong and regular, denies any injuries trauma or falls, remains hemodynamic stable during emergency evaluation, is afebrile, I have low suspicion for acute osseous abnormality given lack of injury, vascular compromise, flexor extensor tendon injury.  Patient was in considerable amount of discomfort upon arrival and continued to yell that she was having pain to the right knee.  Patient received 2 mg of morphine for discomfort which was successful and treating discomfort patient states knee pain does feel much better.  X-ray of the right knee was performed reveals no acute fracture, dislocation present.  Large suprapatellar joint effusion with large intra-articular ossific bodies, moderate amount tricompartmental arthritis.  I do suspect joint effusion and osteoarthritis are the cause of patient's symptoms.  Patient received prescription for Tylenol and strongly encouraged following with orthopedics if pain persists to have joint effusion drained.  Encouraged monitoring symptoms and if symptoms become worse return to emergency room for further evaluation.  Family and patient were agreeable with this plan and patient was discharged home in stable condition.    NEFTALY Best     Portions of this note were generated using digital voice recognition software, and may contain grammatical errors    Procedure  Procedures     NEFTALY Best  01/24/24 2206

## 2024-01-29 ENCOUNTER — LAB REQUISITION (OUTPATIENT)
Dept: LAB | Facility: HOSPITAL | Age: 89
End: 2024-01-29
Payer: COMMERCIAL

## 2024-01-29 DIAGNOSIS — M86.9 OSTEOMYELITIS, UNSPECIFIED (MULTI): ICD-10-CM

## 2024-01-29 LAB
ANION GAP SERPL CALC-SCNC: 10 MMOL/L (ref 10–20)
BUN SERPL-MCNC: 34 MG/DL (ref 6–23)
CALCIUM SERPL-MCNC: 8.3 MG/DL (ref 8.6–10.3)
CHLORIDE SERPL-SCNC: 94 MMOL/L (ref 98–107)
CO2 SERPL-SCNC: 35 MMOL/L (ref 21–32)
CREAT SERPL-MCNC: 0.82 MG/DL (ref 0.5–1.05)
CRP SERPL-MCNC: 2.77 MG/DL
EGFRCR SERPLBLD CKD-EPI 2021: 67 ML/MIN/1.73M*2
ERYTHROCYTE [DISTWIDTH] IN BLOOD BY AUTOMATED COUNT: 17.1 % (ref 11.5–14.5)
ERYTHROCYTE [SEDIMENTATION RATE] IN BLOOD BY WESTERGREN METHOD: 4 MM/H (ref 0–30)
GLUCOSE SERPL-MCNC: 83 MG/DL (ref 74–99)
HCT VFR BLD AUTO: 22 % (ref 36–46)
HGB BLD-MCNC: 7.1 G/DL (ref 12–16)
MCH RBC QN AUTO: 30.1 PG (ref 26–34)
MCHC RBC AUTO-ENTMCNC: 32.3 G/DL (ref 32–36)
MCV RBC AUTO: 93 FL (ref 80–100)
NRBC BLD-RTO: 0 /100 WBCS (ref 0–0)
PLATELET # BLD AUTO: 240 X10*3/UL (ref 150–450)
POTASSIUM SERPL-SCNC: 4.5 MMOL/L (ref 3.5–5.3)
RBC # BLD AUTO: 2.36 X10*6/UL (ref 4–5.2)
SODIUM SERPL-SCNC: 134 MMOL/L (ref 136–145)
WBC # BLD AUTO: 8.8 X10*3/UL (ref 4.4–11.3)

## 2024-01-29 PROCEDURE — 85652 RBC SED RATE AUTOMATED: CPT | Mod: OUT | Performed by: INTERNAL MEDICINE

## 2024-01-29 PROCEDURE — 86140 C-REACTIVE PROTEIN: CPT | Mod: OUT | Performed by: INTERNAL MEDICINE

## 2024-01-29 PROCEDURE — 80048 BASIC METABOLIC PNL TOTAL CA: CPT | Mod: OUT | Performed by: INTERNAL MEDICINE

## 2024-01-29 PROCEDURE — 85027 COMPLETE CBC AUTOMATED: CPT | Mod: OUT | Performed by: INTERNAL MEDICINE

## 2024-01-31 ENCOUNTER — LAB REQUISITION (OUTPATIENT)
Dept: LAB | Facility: HOSPITAL | Age: 89
End: 2024-01-31
Payer: COMMERCIAL

## 2024-01-31 DIAGNOSIS — N39.0 URINARY TRACT INFECTION, SITE NOT SPECIFIED: ICD-10-CM

## 2024-01-31 LAB
ERYTHROCYTE [DISTWIDTH] IN BLOOD BY AUTOMATED COUNT: 16.9 % (ref 11.5–14.5)
HCT VFR BLD AUTO: 23.2 % (ref 36–46)
HGB BLD-MCNC: 7.5 G/DL (ref 12–16)
MCH RBC QN AUTO: 29.8 PG (ref 26–34)
MCHC RBC AUTO-ENTMCNC: 32.3 G/DL (ref 32–36)
MCV RBC AUTO: 92 FL (ref 80–100)
NRBC BLD-RTO: 0 /100 WBCS (ref 0–0)
PLATELET # BLD AUTO: 229 X10*3/UL (ref 150–450)
RBC # BLD AUTO: 2.52 X10*6/UL (ref 4–5.2)
WBC # BLD AUTO: 7.1 X10*3/UL (ref 4.4–11.3)

## 2024-01-31 PROCEDURE — 85027 COMPLETE CBC AUTOMATED: CPT | Mod: OUT | Performed by: INTERNAL MEDICINE

## 2024-02-05 ENCOUNTER — NURSING HOME VISIT (OUTPATIENT)
Dept: POST ACUTE CARE | Facility: EXTERNAL LOCATION | Age: 89
End: 2024-02-05
Payer: COMMERCIAL

## 2024-02-05 DIAGNOSIS — E03.9 HYPOTHYROIDISM, UNSPECIFIED TYPE: ICD-10-CM

## 2024-02-05 DIAGNOSIS — E78.00 HYPERCHOLESTEREMIA: ICD-10-CM

## 2024-02-05 DIAGNOSIS — M17.11 OSTEOARTHRITIS OF RIGHT KNEE, UNSPECIFIED OSTEOARTHRITIS TYPE: ICD-10-CM

## 2024-02-05 DIAGNOSIS — M43.6 TORTICOLLIS: Primary | ICD-10-CM

## 2024-02-05 DIAGNOSIS — I35.0 AORTIC VALVE STENOSIS, ETIOLOGY OF CARDIAC VALVE DISEASE UNSPECIFIED: ICD-10-CM

## 2024-02-05 DIAGNOSIS — I10 HYPERTENSION, UNSPECIFIED TYPE: ICD-10-CM

## 2024-02-05 PROCEDURE — 99497 ADVNCD CARE PLAN 30 MIN: CPT | Performed by: STUDENT IN AN ORGANIZED HEALTH CARE EDUCATION/TRAINING PROGRAM

## 2024-02-05 PROCEDURE — 99305 1ST NF CARE MODERATE MDM 35: CPT | Performed by: STUDENT IN AN ORGANIZED HEALTH CARE EDUCATION/TRAINING PROGRAM

## 2024-02-05 NOTE — LETTER
Patient: Regi Leger  : 10/10/1930    Encounter Date: 2024    Date of Service: 24      HPI/Subjective  Regi Leger is a 89 y.o. female  Past medical history of colonic polyps, malignant neoplasm of colon, nonrheumatic mitral regurgitation, generalized weakness, abdominal aortic aneurysm, aortic stenosis, arthritis, benign paroxysmal positional vertigo, breast cancer, coagulation disorder, constipation, gastric ulcer, esophageal reflux, hypertension, hyperlipidemia, iron deficiency anemia, acute kidney injury.    She is admitted to [facility] for assisted living. She was recently admitted to [facility] for UTI in 2023. Currently doing well. No UTI symptoms reported. Has chronic R knee pain and was seen at [facility] ED in 2024 for similar presentation. XR of the R knee did not show any acute fracture. She had suprapatellar effusion and moderate tricompartmental OA. Currently pain is mild to moderate in R Knee. Denies any fevers, chills, numbness/weakness/tingling in RLE. No other complaints or concerns at this time.     Denies fevers, chills, weight loss, lightheadedness, dizziness, vision changes, sore throat, runny nose, CP, SOB, cough, palpitations, n/v/d, abd pain, black/bloody stools, or new numbness/weakness/tingling in arms/legs/face.        Other Medial, Surgical, Family, Social Hx per chart in PCC.   Medication list reviewed. Please see PCC.     Objective:   Physical Exam     Vital signs reviewed. Please see chart in PCC.     General: NAD. NCAT. AOx3  HEENT: PERRLA. EOMI. MMM. Nares patent bl.  Cardiovascular: RRR. No S1/S2 wnl. Aortic stenosis.   Respiratory: CTABL. No acute respiratory distress.   GI: Soft, NT abdomen. BS present x 4.   : No CVAT BL  MSK: Generalized weakness. No CTLS tenderness.  Extremities: R knee effusion and LROM 2/2 pain.   Skin: No visible rashes or bruises.   Neuro: Cranial Nerves grossly intact. Motor/sensory wnl.   Psych: Mood wnl.          REVIEW OF SYSTEMS    ROS reviewed within HPI and is otherwise negative       Assessment and Plan  Encounter Diagnoses   Name Primary?   • Torticollis Yes   • Hypothyroidism, unspecified type    • Hypertension, unspecified type    • Hypercholesteremia    • Aortic valve stenosis, etiology of cardiac valve disease unspecified    • Osteoarthritis of right knee, unspecified osteoarthritis type        -Continue chronic home medications  -Monitor BP  -Monitor for recurrent UTI  -Consider starting NSAID such as mobic for OA  -Can do R knee steroid injection if needed  -Continue chronic home meds   -Pre-Admission hospital notes reviewed  -Pertinent radiology, if any, reviewed   -Current rehab plan reviewed; continue pending any major changes  -Current medication therapy reviewed. Continue and monitor for adverse effects.  -Monitor vitals  -Monitor labs  -Continue home medications for chronic medical conditions.   -Low carb, Low sodium, Low fat diet advised     ACP: discussed. Patient to remain DNR-CCA for now.       Charting was completed using voice recognition technology and may include unintended errors.    Agnes Marley MD     Electronically Signed By: Agnes Marley MD   2/8/24 12:38 AM

## 2024-02-08 NOTE — PROGRESS NOTES
Date of Service: 2/5/24      HPI/Subjective  Regi Leger is a 89 y.o. female  Past medical history of colonic polyps, malignant neoplasm of colon, nonrheumatic mitral regurgitation, generalized weakness, abdominal aortic aneurysm, aortic stenosis, arthritis, benign paroxysmal positional vertigo, breast cancer, coagulation disorder, constipation, gastric ulcer, esophageal reflux, hypertension, hyperlipidemia, iron deficiency anemia, acute kidney injury.    She is admitted to [facility] for assisted living. She was recently admitted to [facility] for UTI in November 2023. Currently doing well. No UTI symptoms reported. Has chronic R knee pain and was seen at [facility] ED in Jan 2024 for similar presentation. XR of the R knee did not show any acute fracture. She had suprapatellar effusion and moderate tricompartmental OA. Currently pain is mild to moderate in R Knee. Denies any fevers, chills, numbness/weakness/tingling in RLE. No other complaints or concerns at this time.     Denies fevers, chills, weight loss, lightheadedness, dizziness, vision changes, sore throat, runny nose, CP, SOB, cough, palpitations, n/v/d, abd pain, black/bloody stools, or new numbness/weakness/tingling in arms/legs/face.        Other Medial, Surgical, Family, Social Hx per chart in PCC.   Medication list reviewed. Please see PCC.     Objective:   Physical Exam     Vital signs reviewed. Please see chart in PCC.     General: NAD. NCAT. AOx3  HEENT: PERRLA. EOMI. MMM. Nares patent bl.  Cardiovascular: RRR. No S1/S2 wnl. Aortic stenosis.   Respiratory: CTABL. No acute respiratory distress.   GI: Soft, NT abdomen. BS present x 4.   : No CVAT BL  MSK: Generalized weakness. No CTLS tenderness.  Extremities: R knee effusion and LROM 2/2 pain.   Skin: No visible rashes or bruises.   Neuro: Cranial Nerves grossly intact. Motor/sensory wnl.   Psych: Mood wnl.          REVIEW OF SYSTEMS   ROS reviewed within HPI and is otherwise negative       Assessment and  Plan  Encounter Diagnoses   Name Primary?    Torticollis Yes    Hypothyroidism, unspecified type     Hypertension, unspecified type     Hypercholesteremia     Aortic valve stenosis, etiology of cardiac valve disease unspecified     Osteoarthritis of right knee, unspecified osteoarthritis type        -Continue chronic home medications  -Monitor BP  -Monitor for recurrent UTI  -Consider starting NSAID such as mobic for OA  -Can do R knee steroid injection if needed  -Continue chronic home meds   -Pre-Admission hospital notes reviewed  -Pertinent radiology, if any, reviewed   -Current rehab plan reviewed; continue pending any major changes  -Current medication therapy reviewed. Continue and monitor for adverse effects.  -Monitor vitals  -Monitor labs  -Continue home medications for chronic medical conditions.   -Low carb, Low sodium, Low fat diet advised     ACP: discussed. Patient to remain DNR-CCA for now.       Charting was completed using voice recognition technology and may include unintended errors.    Agnes Marley MD

## 2024-02-09 ENCOUNTER — TELEPHONE (OUTPATIENT)
Dept: PRIMARY CARE | Facility: CLINIC | Age: 89
End: 2024-02-09

## 2024-02-17 ENCOUNTER — NURSING HOME VISIT (OUTPATIENT)
Dept: POST ACUTE CARE | Facility: EXTERNAL LOCATION | Age: 89
End: 2024-02-17
Payer: COMMERCIAL

## 2024-02-17 DIAGNOSIS — E78.00 HYPERCHOLESTEREMIA: ICD-10-CM

## 2024-02-17 DIAGNOSIS — E03.9 HYPOTHYROIDISM, UNSPECIFIED TYPE: ICD-10-CM

## 2024-02-17 DIAGNOSIS — M43.6 TORTICOLLIS: Primary | ICD-10-CM

## 2024-02-17 DIAGNOSIS — K59.09 CHRONIC CONSTIPATION: ICD-10-CM

## 2024-02-17 DIAGNOSIS — K59.03 DRUG-INDUCED CONSTIPATION: ICD-10-CM

## 2024-02-17 DIAGNOSIS — I10 HYPERTENSION, UNSPECIFIED TYPE: ICD-10-CM

## 2024-02-17 DIAGNOSIS — M17.11 OSTEOARTHRITIS OF RIGHT KNEE, UNSPECIFIED OSTEOARTHRITIS TYPE: ICD-10-CM

## 2024-02-17 DIAGNOSIS — I35.0 AORTIC VALVE STENOSIS, ETIOLOGY OF CARDIAC VALVE DISEASE UNSPECIFIED: ICD-10-CM

## 2024-02-17 DIAGNOSIS — D64.9 ANEMIA, UNSPECIFIED TYPE: ICD-10-CM

## 2024-02-17 PROCEDURE — 99305 1ST NF CARE MODERATE MDM 35: CPT | Performed by: STUDENT IN AN ORGANIZED HEALTH CARE EDUCATION/TRAINING PROGRAM

## 2024-02-17 NOTE — LETTER
Patient: Regi Leger  : 10/10/1930    Encounter Date: 2024    Date of Service: 24      HPI/Subjective  Regi Leger is a 89 y.o. female  Past medical history of colonic polyps, malignant neoplasm of colon, nonrheumatic mitral regurgitation, generalized weakness, abdominal aortic aneurysm, aortic stenosis, arthritis, benign paroxysmal positional vertigo, breast cancer, coagulation disorder, constipation, gastric ulcer, esophageal reflux, hypertension, hyperlipidemia, iron deficiency anemia, acute kidney injury.      She is a readmit after being sent to [facility] for evaluation of anemia on labs. Was transfused 2U pRBC and had EGD which did not show any major signs of bleeding. Stay at [facility] was complicated by severe constipation requiring multiple bowel regimens and enemas. She had small BM prior to discharge and was passing flatus. KUB in hospital did not show acute obstruction but likely ileus thought to be 2/2 opioid use. Pt seen and evaluated at bedside at [facility]. Doing well. Had a BM on 2024 per RN reports. Currently no major complaints. No BM recommended. Discussed stopping tramadol due to inactivity and recurrent constipation. She is agreeable. No other complaints at this time.     Of note, she has a suprapatellar effusion and moderate tricompartmental OA. Currently pain is mild to moderate in R Knee. Denies any fevers, chills, numbness/weakness/tingling in RLE. No other complaints or concerns at this time.     Denies fevers, chills, weight loss, lightheadedness, dizziness, vision changes, sore throat, runny nose, CP, SOB, cough, palpitations, n/v/d, abd pain, black/bloody stools, or new numbness/weakness/tingling in arms/legs/face.        Other Medial, Surgical, Family, Social Hx per chart in PCC.   Medication list reviewed. Please see PCC.     Objective:   Physical Exam     Vital signs reviewed. Please see chart in PCC.     General: NAD. NCAT. AOx3  HEENT: PERRLA. EOMI. MMM. Nares patent  bl.  Cardiovascular: RRR. No S1/S2 wnl. Aortic stenosis.   Respiratory: CTABL. No acute respiratory distress.   GI: Soft, NT abdomen. BS present x 4.   MSK: Generalized weakness. No CTLS tenderness.  Extremities: R knee effusion and LROM 2/2 pain.   Skin: No visible rashes or bruises.   Neuro: Cranial Nerves grossly intact. Motor/sensory wnl.   Psych: Mood wnl.          REVIEW OF SYSTEMS   ROS reviewed within HPI and is otherwise negative       Assessment and Plan  Encounter Diagnoses   Name Primary?   • Torticollis Yes   • Hypothyroidism, unspecified type    • Hypertension, unspecified type    • Hypercholesteremia    • Aortic valve stenosis, etiology of cardiac valve disease unspecified    • Osteoarthritis of right knee, unspecified osteoarthritis type    • Anemia, unspecified type    • Drug-induced constipation    • Chronic constipation          -Continue chronic home medications  -Monitor BP  -Stop tramadol  -Continue Linzess  -Monitor BM frequency  -Voltaren topical for R knee OA  -Continue chronic home meds   -Pre-Admission hospital notes reviewed  -Pertinent radiology, if any, reviewed   -Current rehab plan reviewed; continue pending any major changes  -Current medication therapy reviewed. Continue and monitor for adverse effects.  -Monitor vitals  -Monitor labs  -Continue home medications for chronic medical conditions.   -Low carb, Low sodium, Low fat diet advised     ACP: discussed. Patient to remain DNR-CCA for now.       Charting was completed using voice recognition technology and may include unintended errors.    Agnes Marley MD       Electronically Signed By: Agnes Marley MD   2/20/24 12:53 AM

## 2024-02-20 NOTE — PROGRESS NOTES
Date of Service: 2/17/24      HPI/Subjective  Regi Leger is a 89 y.o. female  Past medical history of colonic polyps, malignant neoplasm of colon, nonrheumatic mitral regurgitation, generalized weakness, abdominal aortic aneurysm, aortic stenosis, arthritis, benign paroxysmal positional vertigo, breast cancer, coagulation disorder, constipation, gastric ulcer, esophageal reflux, hypertension, hyperlipidemia, iron deficiency anemia, acute kidney injury.      She is a readmit after being sent to [facility] for evaluation of anemia on labs. Was transfused 2U pRBC and had EGD which did not show any major signs of bleeding. Stay at [facility] was complicated by severe constipation requiring multiple bowel regimens and enemas. She had small BM prior to discharge and was passing flatus. KUB in hospital did not show acute obstruction but likely ileus thought to be 2/2 opioid use. Pt seen and evaluated at bedside at [facility]. Doing well. Had a BM on 2/16/2024 per RN reports. Currently no major complaints. No BM recommended. Discussed stopping tramadol due to inactivity and recurrent constipation. She is agreeable. No other complaints at this time.     Of note, she has a suprapatellar effusion and moderate tricompartmental OA. Currently pain is mild to moderate in R Knee. Denies any fevers, chills, numbness/weakness/tingling in RLE. No other complaints or concerns at this time.     Denies fevers, chills, weight loss, lightheadedness, dizziness, vision changes, sore throat, runny nose, CP, SOB, cough, palpitations, n/v/d, abd pain, black/bloody stools, or new numbness/weakness/tingling in arms/legs/face.        Other Medial, Surgical, Family, Social Hx per chart in PCC.   Medication list reviewed. Please see PCC.     Objective:   Physical Exam     Vital signs reviewed. Please see chart in PCC.     General: NAD. NCAT. AOx3  HEENT: PERRLA. EOMI. MMM. Nares patent bl.  Cardiovascular: RRR. No S1/S2 wnl. Aortic stenosis.   Respiratory:  CTABL. No acute respiratory distress.   GI: Soft, NT abdomen. BS present x 4.   MSK: Generalized weakness. No CTLS tenderness.  Extremities: R knee effusion and LROM 2/2 pain.   Skin: No visible rashes or bruises.   Neuro: Cranial Nerves grossly intact. Motor/sensory wnl.   Psych: Mood wnl.          REVIEW OF SYSTEMS   ROS reviewed within HPI and is otherwise negative       Assessment and Plan  Encounter Diagnoses   Name Primary?    Torticollis Yes    Hypothyroidism, unspecified type     Hypertension, unspecified type     Hypercholesteremia     Aortic valve stenosis, etiology of cardiac valve disease unspecified     Osteoarthritis of right knee, unspecified osteoarthritis type     Anemia, unspecified type     Drug-induced constipation     Chronic constipation          -Continue chronic home medications  -Monitor BP  -Stop tramadol  -Continue Linzess  -Monitor BM frequency  -Voltaren topical for R knee OA  -Continue chronic home meds   -Pre-Admission hospital notes reviewed  -Pertinent radiology, if any, reviewed   -Current rehab plan reviewed; continue pending any major changes  -Current medication therapy reviewed. Continue and monitor for adverse effects.  -Monitor vitals  -Monitor labs  -Continue home medications for chronic medical conditions.   -Low carb, Low sodium, Low fat diet advised     ACP: discussed. Patient to remain DNR-CCA for now.       Charting was completed using voice recognition technology and may include unintended errors.    Agnes Marley MD

## 2024-02-21 ENCOUNTER — APPOINTMENT (OUTPATIENT)
Dept: HEMATOLOGY/ONCOLOGY | Facility: CLINIC | Age: 89
End: 2024-02-21
Payer: COMMERCIAL

## 2024-03-12 ENCOUNTER — NURSING HOME VISIT (OUTPATIENT)
Dept: POST ACUTE CARE | Facility: EXTERNAL LOCATION | Age: 89
End: 2024-03-12
Payer: COMMERCIAL

## 2024-03-12 DIAGNOSIS — K59.09 CHRONIC CONSTIPATION: ICD-10-CM

## 2024-03-12 DIAGNOSIS — R53.1 GENERALIZED WEAKNESS: ICD-10-CM

## 2024-03-12 DIAGNOSIS — E78.00 HYPERCHOLESTEREMIA: ICD-10-CM

## 2024-03-12 DIAGNOSIS — D64.9 ANEMIA, UNSPECIFIED TYPE: ICD-10-CM

## 2024-03-12 DIAGNOSIS — M17.11 OSTEOARTHRITIS OF RIGHT KNEE, UNSPECIFIED OSTEOARTHRITIS TYPE: Primary | ICD-10-CM

## 2024-03-12 DIAGNOSIS — I35.0 AORTIC VALVE STENOSIS, ETIOLOGY OF CARDIAC VALVE DISEASE UNSPECIFIED: ICD-10-CM

## 2024-03-12 DIAGNOSIS — I10 HYPERTENSION, UNSPECIFIED TYPE: ICD-10-CM

## 2024-03-12 PROCEDURE — 99304 1ST NF CARE SF/LOW MDM 25: CPT | Performed by: STUDENT IN AN ORGANIZED HEALTH CARE EDUCATION/TRAINING PROGRAM

## 2024-03-12 NOTE — LETTER
Patient: Regi Leger  : 10/10/1930    Encounter Date: 2024    Date of Service: 3/12/2024      HPI/Subjective  Regi Leger is a 89 y.o. female  Past medical history of colonic polyps, malignant neoplasm of colon, nonrheumatic mitral regurgitation, generalized weakness, abdominal aortic aneurysm, aortic stenosis, arthritis, benign paroxysmal positional vertigo, breast cancer, coagulation disorder, constipation, gastric ulcer, esophageal reflux, hypertension, hyperlipidemia, iron deficiency anemia, acute kidney injury.    Patient is seen as a readmission.  She was initially discharged home to live with her nephew but he was unable to take care of her and brought her back to assisted living.  Currently has no major complaints but states that she would like to partake in physical and occupational therapy for generalized weakness.  Of note patient was admitted to [facility] previously and requires an outpatient follow-up with GI for colonoscopy due to intermittent constipation.  Currently she is having bowel movements and tolerating oral intake appropriately.  Denies any nausea or vomiting.  Denies any black or bloody stools.  No other complaints or concerns at this time    Of note, she has a suprapatellar effusion and moderate tricompartmental OA. Currently pain is mild to moderate in R Knee. Denies any fevers, chills, numbness/weakness/tingling in RLE. No other complaints or concerns at this time.     Denies fevers, chills, weight loss, lightheadedness, dizziness, vision changes, sore throat, runny nose, CP, SOB, cough, palpitations, n/v/d, abd pain, black/bloody stools, or new numbness/weakness/tingling in arms/legs/face.        Other Medical, Surgical, Family, Social Hx per chart in PCC.   Medication list reviewed. Please see PCC.     Objective:   Physical Exam     Vital signs reviewed. Please see chart in P[facility].     General: NAD. NCAT. AOx3  HEENT: CELIA. RORY. GABBIE. Kianna patent  bl.  Cardiovascular: RRR. No S1/S2 wnl. Aortic stenosis.   Respiratory: CTABL. No acute respiratory distress.   GI: Soft, NT abdomen. BS present x 4.   MSK: Generalized weakness. No CTLS tenderness.  Extremities: R knee effusion; improved from prior evaluation and LROM 2/2 pain.   Skin: No visible rashes or bruises.   Neuro: Cranial Nerves grossly intact. Motor/sensory wnl.   Psych: Mood wnl.          REVIEW OF SYSTEMS   ROS reviewed within HPI and is otherwise negative       Assessment and Plan  Encounter Diagnoses   Name Primary?   • Osteoarthritis of right knee, unspecified osteoarthritis type Yes   • Generalized weakness    • Hypertension, unspecified type    • Hypercholesteremia    • Aortic valve stenosis, etiology of cardiac valve disease unspecified    • Anemia, unspecified type    • Chronic constipation            -Continue chronic home medications  -Monitor BP  -Continue Linzess  -Follow-up with GI for outpatient colonoscopy  -Voltaren topical for R knee OA  -Continue chronic home meds   -Pre-Admission hospital notes reviewed  -Pertinent radiology, if any, reviewed   -Current rehab plan reviewed; continue pending any major changes  -Current medication therapy reviewed. Continue and monitor for adverse effects.  -Monitor vitals  -Monitor labs  -Continue home medications for chronic medical conditions.   -Low carb, Low sodium, Low fat diet advised     Charting was completed using voice recognition technology and may include unintended errors.    Agnes Marley MD       Electronically Signed By: Agnes Marley MD   3/13/24 12:48 AM

## 2024-03-13 NOTE — PROGRESS NOTES
Date of Service: 3/12/2024      HPI/Subjective  Regi Leger is a 89 y.o. female  Past medical history of colonic polyps, malignant neoplasm of colon, nonrheumatic mitral regurgitation, generalized weakness, abdominal aortic aneurysm, aortic stenosis, arthritis, benign paroxysmal positional vertigo, breast cancer, coagulation disorder, constipation, gastric ulcer, esophageal reflux, hypertension, hyperlipidemia, iron deficiency anemia, acute kidney injury.    Patient is seen as a readmission.  She was initially discharged home to live with her nephew but he was unable to take care of her and brought her back to assisted living.  Currently has no major complaints but states that she would like to partake in physical and occupational therapy for generalized weakness.  Of note patient was admitted to [facility] previously and requires an outpatient follow-up with GI for colonoscopy due to intermittent constipation.  Currently she is having bowel movements and tolerating oral intake appropriately.  Denies any nausea or vomiting.  Denies any black or bloody stools.  No other complaints or concerns at this time    Of note, she has a suprapatellar effusion and moderate tricompartmental OA. Currently pain is mild to moderate in R Knee. Denies any fevers, chills, numbness/weakness/tingling in RLE. No other complaints or concerns at this time.     Denies fevers, chills, weight loss, lightheadedness, dizziness, vision changes, sore throat, runny nose, CP, SOB, cough, palpitations, n/v/d, abd pain, black/bloody stools, or new numbness/weakness/tingling in arms/legs/face.        Other Medical, Surgical, Family, Social Hx per chart in [facility].   Medication list reviewed. Please see PCC.     Objective:   Physical Exam     Vital signs reviewed. Please see chart in PCC.     General: NAD. NCAT. AOx3  HEENT: PERRLA. EOMI. MMM. Nares patent bl.  Cardiovascular: RRR. No S1/S2 wnl. Aortic stenosis.   Respiratory: CTABL. No  acute respiratory distress.   GI: Soft, NT abdomen. BS present x 4.   MSK: Generalized weakness. No CTLS tenderness.  Extremities: R knee effusion; improved from prior evaluation and LROM 2/2 pain.   Skin: No visible rashes or bruises.   Neuro: Cranial Nerves grossly intact. Motor/sensory wnl.   Psych: Mood wnl.          REVIEW OF SYSTEMS   ROS reviewed within HPI and is otherwise negative       Assessment and Plan  Encounter Diagnoses   Name Primary?    Osteoarthritis of right knee, unspecified osteoarthritis type Yes    Generalized weakness     Hypertension, unspecified type     Hypercholesteremia     Aortic valve stenosis, etiology of cardiac valve disease unspecified     Anemia, unspecified type     Chronic constipation            -Continue chronic home medications  -Monitor BP  -Continue Linzess  -Follow-up with GI for outpatient colonoscopy  -Voltaren topical for R knee OA  -Continue chronic home meds   -Pre-Admission hospital notes reviewed  -Pertinent radiology, if any, reviewed   -Current rehab plan reviewed; continue pending any major changes  -Current medication therapy reviewed. Continue and monitor for adverse effects.  -Monitor vitals  -Monitor labs  -Continue home medications for chronic medical conditions.   -Low carb, Low sodium, Low fat diet advised     Charting was completed using voice recognition technology and may include unintended errors.    Agnes Marley MD

## 2024-04-13 DIAGNOSIS — K59.09 CHRONIC CONSTIPATION: Primary | ICD-10-CM

## 2024-07-29 ENCOUNTER — NURSING HOME VISIT (OUTPATIENT)
Dept: POST ACUTE CARE | Facility: EXTERNAL LOCATION | Age: 89
End: 2024-07-29
Payer: COMMERCIAL

## 2024-07-29 DIAGNOSIS — D50.9 IRON DEFICIENCY ANEMIA, UNSPECIFIED IRON DEFICIENCY ANEMIA TYPE: ICD-10-CM

## 2024-07-29 DIAGNOSIS — R10.9 ABDOMINAL PAIN, UNSPECIFIED ABDOMINAL LOCATION: Primary | ICD-10-CM

## 2024-07-29 DIAGNOSIS — N20.0 KIDNEY STONES: ICD-10-CM

## 2024-07-29 DIAGNOSIS — E03.9 HYPOTHYROIDISM, UNSPECIFIED TYPE: ICD-10-CM

## 2024-07-29 DIAGNOSIS — E78.5 HYPERLIPIDEMIA, UNSPECIFIED HYPERLIPIDEMIA TYPE: ICD-10-CM

## 2024-07-29 DIAGNOSIS — R53.1 GENERALIZED WEAKNESS: ICD-10-CM

## 2024-07-29 DIAGNOSIS — I71.40 ABDOMINAL AORTIC ANEURYSM (AAA), UNSPECIFIED PART, UNSPECIFIED WHETHER RUPTURED (CMS-HCC): ICD-10-CM

## 2024-07-29 DIAGNOSIS — N18.30 HYPERTENSIVE KIDNEY DISEASE WITH STAGE 3 CHRONIC KIDNEY DISEASE, UNSPECIFIED WHETHER STAGE 3A OR 3B CKD (MULTI): ICD-10-CM

## 2024-07-29 DIAGNOSIS — K59.09 CHRONIC CONSTIPATION: ICD-10-CM

## 2024-07-29 DIAGNOSIS — C50.912 MALIGNANT NEOPLASM OF LEFT FEMALE BREAST, UNSPECIFIED ESTROGEN RECEPTOR STATUS, UNSPECIFIED SITE OF BREAST (MULTI): ICD-10-CM

## 2024-07-29 DIAGNOSIS — I10 HYPERTENSION, UNSPECIFIED TYPE: ICD-10-CM

## 2024-07-29 DIAGNOSIS — I12.9 HYPERTENSIVE KIDNEY DISEASE WITH STAGE 3 CHRONIC KIDNEY DISEASE, UNSPECIFIED WHETHER STAGE 3A OR 3B CKD (MULTI): ICD-10-CM

## 2024-07-29 DIAGNOSIS — K86.9 PANCREAS DISORDER (HHS-HCC): ICD-10-CM

## 2024-07-29 DIAGNOSIS — M17.11 PRIMARY OSTEOARTHRITIS OF RIGHT KNEE: ICD-10-CM

## 2024-07-29 PROCEDURE — 99309 SBSQ NF CARE MODERATE MDM 30: CPT

## 2024-07-29 NOTE — LETTER
Patient: Regi Leger  : 10/10/1930    Encounter Date: 2024    Visit  Note   Subjective  Regi Leger is a 89 y.o. female who is being seen and evaluated for multiple medical problems. Nursing notes, vital signs, and labs were reviewed in the local facility chart.  No chief complaint on file.     This is a 89-year-old female who was recently transferred from a [facility] in [place] to [facility] in [place].  Majority of her medical information is extracted from extensive chart review due to patient's poor hearing. She is able to answer basic questions. She was hospitalized this year at [facility] and requires outpatient follow-up with GI for a colonoscopy due to intermittent constipation.  She was sent to [facility] for rehabilitation and she later discharged home with her nephew.  Unfortunately he was unable to care for her at home and she returned to the skilled nursing facility for generalized weakness and ongoing care.  She is noted to have a suprapatellar effusion and moderate tricompartmental osteoarthritis with some mild to moderate knee pain that was controlled with tylenol PRN and meloxicam previously.    She has a past medical history of colonic polyps, nonrheumatic mitral regurgitation, generalized weakness, abdominal aortic aneurysm, aortic stenosis, arthritis, benign paroxysmal positional vertigo, breast cancer, constipation, gastric ulcer, esophageal reflux, hypertension, hyperlipidemia, iron deficiency anemia.    On examination today she denies any nausea, vomiting, chest pains, shortness of breath, lightheaded or dizziness, or knee pain.  She is endorsing abdominal pain today left-sided.  No other concerns for medical team today.       Objective  LMP  (LMP Unknown)   Physical Exam  Vitals reviewed.   Constitutional:       Appearance: Normal appearance.   HENT:      Head: Normocephalic.   Cardiovascular:      Rate and Rhythm: Normal rate and regular  rhythm.   Pulmonary:      Effort: Pulmonary effort is normal. No respiratory distress.      Breath sounds: Normal breath sounds. No wheezing, rhonchi or rales.   Abdominal:      General: Bowel sounds are normal. There is no distension.      Palpations: Abdomen is soft. There is no mass.      Tenderness: There is abdominal tenderness. There is no guarding or rebound.      Comments: LUQ/LLQ tenderness with palpation   Musculoskeletal:      Cervical back: Neck supple.   Skin:     General: Skin is warm and dry.   Neurological:      Mental Status: She is alert.   Psychiatric:         Mood and Affect: Mood normal.         Behavior: Behavior normal.       Assessment/Plan  Assessment & Plan  Abdominal pain, unspecified abdominal location  She is complaining of JUAN DAVID/LLQ pain that is mildly tender on palpation. given her history, abdominal XR will be performed to rule out obstruction/ileus. Labs will be completed on next convenient lab day.        Chronic constipation  Continue linzess.          Generalized weakness  She has transferred from [facility] to [facility] for ongoing nursing care as she was unable to care for herself at home and her nephew was unable to help her.          Hypertensive kidney disease with stage 3 chronic kidney disease, unspecified whether stage 3a or 3b CKD (Multi)  Lab Results   Component Value Date    GLUCOSE 83 01/29/2024    CALCIUM 8.3 (L) 01/29/2024     (L) 01/29/2024    K 4.5 01/29/2024    CO2 35 (H) 01/29/2024    CL 94 (L) 01/29/2024    BUN 34 (H) 01/29/2024    CREATININE 0.82 01/29/2024     Plan for repeat BMP on next convenient lab day.          Hypothyroidism, unspecified type  Lab Results   Component Value Date    TSH 10.86 (H) 10/12/2023     Plan for repeat TSH on next convenient lab day         Iron deficiency anemia, unspecified iron deficiency anemia type  Lab Results   Component Value Date    WBC 7.1 01/31/2024    HGB 7.5 (L) 01/31/2024    HCT 23.2 (L) 01/31/2024    MCV 92  01/31/2024     01/31/2024       This appears severe on record review. Plan for CBC on next convenient lab day.          Primary osteoarthritis of right knee  No complaints of knee pain today; using meloxicam for control; may use tylenol PRN for relief         Malignant neoplasm of left female breast, unspecified estrogen receptor status, unspecified site of breast (Multi)  she is being treated with hormonal therapy with Anastrozole beginning Aug 2020; follows with Dr. Mancilla          Pancreas disorder ([facility]-HCC)  IPMN lesion noted on CT of abdomen in 2016, recommend yearly follow up, ordered Testing 8/14/17 825/17 CT of abdomen shows stable IPMN lesions in pancreas; most recent CT abdomen shows stability 11/30/2023         Abdominal aortic aneurysm (AAA), unspecified part, unspecified whether ruptured (CMS-HCC)  11/30/2023 CT abdomen shows stability of endovascular grafts         Hypertension, unspecified type  Blood pressure is well controlled with amlodipine, furosemide. Last /66         Hyperlipidemia, unspecified hyperlipidemia type  Continue statin therapy         Kidney stones  Stable non-obstructing on last CT 11/30/2024; there is no reported difficulty with urination.                 Please excuse any errors in grammar or translation related to this dictation. Voice recognition software was utilized to prepare this document.       Electronically Signed By: NEFTALY Barnett   7/30/24  9:45 AM

## 2024-07-29 NOTE — PROGRESS NOTES
Visit  Note   Subjective   Regi Leger is a 89 y.o. female who is being seen and evaluated for multiple medical problems. Nursing notes, vital signs, and labs were reviewed in the local facility chart.  No chief complaint on file.     This is a 89-year-old female who was recently transferred from a [facility] in [place] to [facility] in [place].  Majority of her medical information is extracted from extensive chart review due to patient's poor hearing. She is able to answer basic questions. She was hospitalized this year at [facility] and requires outpatient follow-up with GI for a colonoscopy due to intermittent constipation.  She was sent to [facility] for rehabilitation and she later discharged home with her nephew.  Unfortunately he was unable to care for her at home and she returned to the skilled nursing facility for generalized weakness and ongoing care.  She is noted to have a suprapatellar effusion and moderate tricompartmental osteoarthritis with some mild to moderate knee pain that was controlled with tylenol PRN and meloxicam previously.    She has a past medical history of colonic polyps, nonrheumatic mitral regurgitation, generalized weakness, abdominal aortic aneurysm, aortic stenosis, arthritis, benign paroxysmal positional vertigo, breast cancer, constipation, gastric ulcer, esophageal reflux, hypertension, hyperlipidemia, iron deficiency anemia.    On examination today she denies any nausea, vomiting, chest pains, shortness of breath, lightheaded or dizziness, or knee pain.  She is endorsing abdominal pain today left-sided.  No other concerns for medical team today.       Objective   LMP  (LMP Unknown)   Physical Exam  Vitals reviewed.   Constitutional:       Appearance: Normal appearance.   HENT:      Head: Normocephalic.   Cardiovascular:      Rate and Rhythm: Normal rate and regular rhythm.   Pulmonary:      Effort: Pulmonary effort is normal. No respiratory  distress.      Breath sounds: Normal breath sounds. No wheezing, rhonchi or rales.   Abdominal:      General: Bowel sounds are normal. There is no distension.      Palpations: Abdomen is soft. There is no mass.      Tenderness: There is abdominal tenderness. There is no guarding or rebound.      Comments: LUQ/LLQ tenderness with palpation   Musculoskeletal:      Cervical back: Neck supple.   Skin:     General: Skin is warm and dry.   Neurological:      Mental Status: She is alert.   Psychiatric:         Mood and Affect: Mood normal.         Behavior: Behavior normal.       Assessment/Plan   Assessment & Plan  Abdominal pain, unspecified abdominal location  She is complaining of JUAN DAVID/LLQ pain that is mildly tender on palpation. given her history, abdominal XR will be performed to rule out obstruction/ileus. Labs will be completed on next convenient lab day.        Chronic constipation  Continue linzess.          Generalized weakness  She has transferred from [facility] to [facility] for ongoing nursing care as she was unable to care for herself at home and her nephew was unable to help her.          Hypertensive kidney disease with stage 3 chronic kidney disease, unspecified whether stage 3a or 3b CKD (Multi)  Lab Results   Component Value Date    GLUCOSE 83 01/29/2024    CALCIUM 8.3 (L) 01/29/2024     (L) 01/29/2024    K 4.5 01/29/2024    CO2 35 (H) 01/29/2024    CL 94 (L) 01/29/2024    BUN 34 (H) 01/29/2024    CREATININE 0.82 01/29/2024     Plan for repeat BMP on next convenient lab day.          Hypothyroidism, unspecified type  Lab Results   Component Value Date    TSH 10.86 (H) 10/12/2023     Plan for repeat TSH on next convenient lab day         Iron deficiency anemia, unspecified iron deficiency anemia type  Lab Results   Component Value Date    WBC 7.1 01/31/2024    HGB 7.5 (L) 01/31/2024    HCT 23.2 (L) 01/31/2024    MCV 92 01/31/2024     01/31/2024       This appears severe on record review. Plan  for CBC on next convenient lab day.          Primary osteoarthritis of right knee  No complaints of knee pain today; using meloxicam for control; may use tylenol PRN for relief         Malignant neoplasm of left female breast, unspecified estrogen receptor status, unspecified site of breast (Multi)  she is being treated with hormonal therapy with Anastrozole beginning Aug 2020; follows with Dr. Mancilla          Pancreas disorder ([facility])  IPMN lesion noted on CT of abdomen in 2016, recommend yearly follow up, ordered Testing 8/14/17 825/17 CT of abdomen shows stable IPMN lesions in pancreas; most recent CT abdomen shows stability 11/30/2023         Abdominal aortic aneurysm (AAA), unspecified part, unspecified whether ruptured (CMS-HCC)  11/30/2023 CT abdomen shows stability of endovascular grafts         Hypertension, unspecified type  Blood pressure is well controlled with amlodipine, furosemide. Last /66         Hyperlipidemia, unspecified hyperlipidemia type  Continue statin therapy         Kidney stones  Stable non-obstructing on last CT 11/30/2024; there is no reported difficulty with urination.                 Please excuse any errors in grammar or translation related to this dictation. Voice recognition software was utilized to prepare this document.

## 2024-07-30 ENCOUNTER — NURSING HOME VISIT (OUTPATIENT)
Dept: POST ACUTE CARE | Facility: EXTERNAL LOCATION | Age: 89
End: 2024-07-30
Payer: COMMERCIAL

## 2024-07-30 DIAGNOSIS — M17.0 PRIMARY OSTEOARTHRITIS OF BOTH KNEES: ICD-10-CM

## 2024-07-30 DIAGNOSIS — C50.912 MALIGNANT NEOPLASM OF LEFT FEMALE BREAST, UNSPECIFIED ESTROGEN RECEPTOR STATUS, UNSPECIFIED SITE OF BREAST (MULTI): ICD-10-CM

## 2024-07-30 DIAGNOSIS — I10 HTN (HYPERTENSION), BENIGN: ICD-10-CM

## 2024-07-30 DIAGNOSIS — E03.9 HYPOTHYROIDISM, ACQUIRED: ICD-10-CM

## 2024-07-30 DIAGNOSIS — D50.9 IRON DEFICIENCY ANEMIA, UNSPECIFIED IRON DEFICIENCY ANEMIA TYPE: ICD-10-CM

## 2024-07-30 DIAGNOSIS — I35.0 AORTIC VALVE STENOSIS, ETIOLOGY OF CARDIAC VALVE DISEASE UNSPECIFIED: ICD-10-CM

## 2024-07-30 DIAGNOSIS — R53.1 GENERALIZED WEAKNESS: Primary | ICD-10-CM

## 2024-07-30 PROBLEM — L02.11 NECK ABSCESS: Status: RESOLVED | Noted: 2024-01-08 | Resolved: 2024-07-30

## 2024-07-30 PROBLEM — R39.11 URINARY HESITANCY: Status: RESOLVED | Noted: 2024-01-08 | Resolved: 2024-07-30

## 2024-07-30 PROBLEM — R79.89 ABNORMAL LFTS (LIVER FUNCTION TESTS): Status: RESOLVED | Noted: 2024-01-08 | Resolved: 2024-07-30

## 2024-07-30 PROBLEM — I71.9 AORTIC ANEURYSM (CMS-HCC): Status: RESOLVED | Noted: 2017-08-14 | Resolved: 2024-07-30

## 2024-07-30 PROBLEM — M70.52 BURSITIS OF LEFT KNEE: Status: RESOLVED | Noted: 2024-01-08 | Resolved: 2024-07-30

## 2024-07-30 PROBLEM — N20.0 KIDNEY STONES: Status: RESOLVED | Noted: 2024-01-08 | Resolved: 2024-07-30

## 2024-07-30 PROBLEM — E78.00 HYPERCHOLESTEREMIA: Status: RESOLVED | Noted: 2024-01-08 | Resolved: 2024-07-30

## 2024-07-30 PROBLEM — D49.0 IPMN (INTRADUCTAL PAPILLARY MUCINOUS NEOPLASM): Status: RESOLVED | Noted: 2024-01-08 | Resolved: 2024-07-30

## 2024-07-30 PROBLEM — R07.9 CHEST PAIN: Status: RESOLVED | Noted: 2024-01-08 | Resolved: 2024-07-30

## 2024-07-30 PROBLEM — N63.20 LUMP OF BREAST, LEFT: Status: RESOLVED | Noted: 2024-01-08 | Resolved: 2024-07-30

## 2024-07-30 PROBLEM — N63.42 SUBAREOLAR MASS OF LEFT BREAST: Status: RESOLVED | Noted: 2024-01-08 | Resolved: 2024-07-30

## 2024-07-30 PROBLEM — B96.81 HELICOBACTER PYLORI GASTRITIS: Status: RESOLVED | Noted: 2024-01-08 | Resolved: 2024-07-30

## 2024-07-30 PROBLEM — H61.22 IMPACTED CERUMEN, LEFT EAR: Status: RESOLVED | Noted: 2024-01-08 | Resolved: 2024-07-30

## 2024-07-30 PROBLEM — I25.10 CORONARY ARTERY DISEASE: Status: RESOLVED | Noted: 2024-01-08 | Resolved: 2024-07-30

## 2024-07-30 PROBLEM — R13.13 DYSPHAGIA, CRICOPHARYNGEAL: Status: RESOLVED | Noted: 2024-01-08 | Resolved: 2024-07-30

## 2024-07-30 PROBLEM — I25.118: Status: RESOLVED | Noted: 2024-01-08 | Resolved: 2024-07-30

## 2024-07-30 PROBLEM — R04.0 EPISTAXIS: Status: RESOLVED | Noted: 2024-01-08 | Resolved: 2024-07-30

## 2024-07-30 PROBLEM — R20.8 BURNING SENSATION OF FEET: Status: RESOLVED | Noted: 2024-01-08 | Resolved: 2024-07-30

## 2024-07-30 PROBLEM — E87.8 LOW BICARBONATE LEVEL: Status: RESOLVED | Noted: 2024-01-08 | Resolved: 2024-07-30

## 2024-07-30 PROBLEM — I71.43 ANEURYSM OF INFRARENAL ABDOMINAL AORTA (CMS-HCC): Status: RESOLVED | Noted: 2024-01-08 | Resolved: 2024-07-30

## 2024-07-30 PROBLEM — I70.0 ATHEROSCLEROSIS OF AORTA (CMS-HCC): Status: RESOLVED | Noted: 2024-01-08 | Resolved: 2024-07-30

## 2024-07-30 PROBLEM — N39.0 UTI (URINARY TRACT INFECTION): Status: RESOLVED | Noted: 2023-11-26 | Resolved: 2024-07-30

## 2024-07-30 PROBLEM — K29.70 HELICOBACTER PYLORI GASTRITIS: Status: RESOLVED | Noted: 2024-01-08 | Resolved: 2024-07-30

## 2024-07-30 PROBLEM — R60.0 EDEMA OF BOTH LEGS: Status: RESOLVED | Noted: 2024-01-08 | Resolved: 2024-07-30

## 2024-07-30 PROBLEM — H91.90 HEARING IMPAIRED: Status: RESOLVED | Noted: 2024-01-08 | Resolved: 2024-07-30

## 2024-07-30 PROBLEM — R92.8 ABNORMAL MAMMOGRAM OF LEFT BREAST: Status: RESOLVED | Noted: 2024-01-08 | Resolved: 2024-07-30

## 2024-07-30 PROBLEM — R63.6 UNDERWEIGHT ON EXAMINATION: Status: RESOLVED | Noted: 2024-01-08 | Resolved: 2024-07-30

## 2024-07-30 PROBLEM — L98.9 SKIN LESION: Status: RESOLVED | Noted: 2024-01-08 | Resolved: 2024-07-30

## 2024-07-30 PROBLEM — R06.1 INSPIRATORY STRIDOR: Status: RESOLVED | Noted: 2024-01-08 | Resolved: 2024-07-30

## 2024-07-30 PROBLEM — M19.90 ARTHRITIS: Status: RESOLVED | Noted: 2024-01-08 | Resolved: 2024-07-30

## 2024-07-30 PROBLEM — K25.9 GASTRIC ULCER: Status: RESOLVED | Noted: 2024-01-08 | Resolved: 2024-07-30

## 2024-07-30 PROBLEM — Z22.322 MRSA CARRIER: Status: RESOLVED | Noted: 2024-01-08 | Resolved: 2024-07-30

## 2024-07-30 PROBLEM — R23.4 ESCHAR OF LOWER LEG: Status: RESOLVED | Noted: 2024-01-08 | Resolved: 2024-07-30

## 2024-07-30 PROBLEM — H81.11 BENIGN PAROXYSMAL POSITIONAL VERTIGO OF RIGHT EAR: Status: RESOLVED | Noted: 2024-01-08 | Resolved: 2024-07-30

## 2024-07-30 PROBLEM — D68.9 COAGULATION DISORDER (MULTI): Status: RESOLVED | Noted: 2024-01-08 | Resolved: 2024-07-30

## 2024-07-30 PROBLEM — R68.89 UNINTENTIONAL WEIGHT CHANGE: Status: RESOLVED | Noted: 2024-01-08 | Resolved: 2024-07-30

## 2024-07-30 PROCEDURE — 99305 1ST NF CARE MODERATE MDM 35: CPT | Performed by: FAMILY MEDICINE

## 2024-07-30 NOTE — ASSESSMENT & PLAN NOTE
Stable non-obstructing on last CT 11/30/2024; there is no reported difficulty with urination.

## 2024-07-30 NOTE — ASSESSMENT & PLAN NOTE
No complaints of knee pain today; using meloxicam for control; may use tylenol PRN for relief

## 2024-07-30 NOTE — ASSESSMENT & PLAN NOTE
Lab Results   Component Value Date    GLUCOSE 83 01/29/2024    CALCIUM 8.3 (L) 01/29/2024     (L) 01/29/2024    K 4.5 01/29/2024    CO2 35 (H) 01/29/2024    CL 94 (L) 01/29/2024    BUN 34 (H) 01/29/2024    CREATININE 0.82 01/29/2024     Plan for repeat BMP on next convenient lab day.

## 2024-07-30 NOTE — ASSESSMENT & PLAN NOTE
she is being treated with hormonal therapy with Anastrozole beginning Aug 2020; follows with Dr. Mancilla

## 2024-07-30 NOTE — ASSESSMENT & PLAN NOTE
IPMN lesion noted on CT of abdomen in 2016, recommend yearly follow up, ordered Testing 8/14/17 825/17 CT of abdomen shows stable IPMN lesions in pancreas; most recent CT abdomen shows stability 11/30/2023

## 2024-07-30 NOTE — ASSESSMENT & PLAN NOTE
Lab Results   Component Value Date    WBC 7.1 01/31/2024    HGB 7.5 (L) 01/31/2024    HCT 23.2 (L) 01/31/2024    MCV 92 01/31/2024     01/31/2024       This appears severe on record review. Plan for CBC on next convenient lab day.

## 2024-07-30 NOTE — PROGRESS NOTES
Admission H&P  Subjective   Regi Leger is a 89 y.o. female who is being seen for an admission H&P.  Nursing notes, vital signs, and labs were reviewed in the local facility chart and she  is being evaluated for multiple medical problems.   HPI she transferred to this facility from an assisted living facility in [place].  Chart review indicates a hospitalization earlier this year recommending an out patient to follow-up with GI due to chronic constipation.  She had been discharged home with her nephew's caregiver but was unable to manage with this level of care and is returning to skilled nursing facility due to overall deconditioning, generalized weakness and high ongoing care needs.  Objective   LMP  (LMP Unknown)   Physical Exam  Constitutional:       Appearance: Normal appearance.   HENT:      Head: Normocephalic.   Eyes:      Conjunctiva/sclera: Conjunctivae normal.   Cardiovascular:      Rate and Rhythm: Normal rate and regular rhythm.      Heart sounds: Murmur (loud ejection murmur) heard.   Pulmonary:      Effort: Pulmonary effort is normal.      Breath sounds: Normal breath sounds.   Musculoskeletal:      Cervical back: Neck supple.      Right lower leg: No edema.      Left lower leg: No edema.   Skin:     General: Skin is warm and dry.   Neurological:      Mental Status: She is alert.       Assessment/Plan   Assessment & Plan  Generalized weakness  She is transferred from another facility for long-term residence here and supportive care         Aortic valve stenosis, etiology of cardiac valve disease unspecified  According to her family she is declining any further intervention or treatment for this         Malignant neoplasm of left female breast, unspecified estrogen receptor status, unspecified site of breast (Multi)  She has declined further treatment or intervention for this but is taking and Anstrozole for suppression         HTN (hypertension), benign         Hypothyroidism,  acquired  Lab Results   Component Value Date    TSH 10.86 (H) 10/12/2023     Plan for repeat TSH on next convenient lab day           Iron deficiency anemia, unspecified iron deficiency anemia type  This was severe in the past.  We will repeat the CBC to see where her hemoglobin is now.  She is currently taking oral iron supplementation.  If the hemoglobin is dropping then we will pursue further GI follow-up to identify source.  If it is stable or increasing then we will simply continue the iron supplementation and monitor         Primary osteoarthritis of both knees  She continues to complain to the family routinely about pain in her knees.  She is wearing the lidocaine patches currently.  I will add a routine dose of Tylenol 650 mg once a day and refer her to physical therapy                Please excuse any errors in grammar or translation related to this dictation. Voice recognition software was utilized to prepare this document.

## 2024-07-30 NOTE — ASSESSMENT & PLAN NOTE
She has transferred from [facility] to [facility] for ongoing nursing care as she was unable to care for herself at home and her nephew was unable to help her.

## 2024-07-30 NOTE — ASSESSMENT & PLAN NOTE
Lab Results   Component Value Date    TSH 10.86 (H) 10/12/2023     Plan for repeat TSH on next convenient lab day

## 2024-07-30 NOTE — ASSESSMENT & PLAN NOTE
She continues to complain to the family routinely about pain in her knees.  She is wearing the lidocaine patches currently.  I will add a routine dose of Tylenol 650 mg once a day and refer her to physical therapy

## 2024-07-30 NOTE — LETTER
Patient: Regi Leger  : 10/10/1930    Encounter Date: 2024    Admission H&P  Subjective   Regi Leger is a 89 y.o. female who is being seen for an admission H&P.  Nursing notes, vital signs, and labs were reviewed in the local facility chart and she  is being evaluated for multiple medical problems.   HPI she transferred to this facility from an assisted living facility in [place].  Chart review indicates a hospitalization earlier this year recommending an out patient to follow-up with GI due to chronic constipation.  She had been discharged home with her nephew's caregiver but was unable to manage with this level of care and is returning to skilled nursing facility due to overall deconditioning, generalized weakness and high ongoing care needs.  Objective   LMP  (LMP Unknown)   Physical Exam  Constitutional:       Appearance: Normal appearance.   HENT:      Head: Normocephalic.   Eyes:      Conjunctiva/sclera: Conjunctivae normal.   Cardiovascular:      Rate and Rhythm: Normal rate and regular rhythm.      Heart sounds: Murmur (loud ejection murmur) heard.   Pulmonary:      Effort: Pulmonary effort is normal.      Breath sounds: Normal breath sounds.   Musculoskeletal:      Cervical back: Neck supple.      Right lower leg: No edema.      Left lower leg: No edema.   Skin:     General: Skin is warm and dry.   Neurological:      Mental Status: She is alert.       Assessment/Plan   Assessment & Plan  Generalized weakness  She is transferred from another facility for long-term residence here and supportive care         Aortic valve stenosis, etiology of cardiac valve disease unspecified  According to her family she is declining any further intervention or treatment for this         Malignant neoplasm of left female breast, unspecified estrogen receptor status, unspecified site of breast (Multi)  She has declined further treatment or intervention for this but is taking and Anstrozole for  suppression         HTN (hypertension), benign         Hypothyroidism, acquired  Lab Results   Component Value Date    TSH 10.86 (H) 10/12/2023     Plan for repeat TSH on next convenient lab day           Iron deficiency anemia, unspecified iron deficiency anemia type  This was severe in the past.  We will repeat the CBC to see where her hemoglobin is now.  She is currently taking oral iron supplementation.  If the hemoglobin is dropping then we will pursue further GI follow-up to identify source.  If it is stable or increasing then we will simply continue the iron supplementation and monitor         Primary osteoarthritis of both knees  She continues to complain to the family routinely about pain in her knees.  She is wearing the lidocaine patches currently.  I will add a routine dose of Tylenol 650 mg once a day and refer her to physical therapy                Please excuse any errors in grammar or translation related to this dictation. Voice recognition software was utilized to prepare this document.       Electronically Signed By: Arturo Hong MD   7/30/24 12:15 PM

## 2024-07-30 NOTE — ASSESSMENT & PLAN NOTE
She has declined further treatment or intervention for this but is taking and Anstrozole for suppression

## 2024-07-30 NOTE — ASSESSMENT & PLAN NOTE
This was severe in the past.  We will repeat the CBC to see where her hemoglobin is now.  She is currently taking oral iron supplementation.  If the hemoglobin is dropping then we will pursue further GI follow-up to identify source.  If it is stable or increasing then we will simply continue the iron supplementation and monitor

## 2024-07-31 ENCOUNTER — NURSING HOME VISIT (OUTPATIENT)
Dept: POST ACUTE CARE | Facility: EXTERNAL LOCATION | Age: 89
End: 2024-07-31
Payer: COMMERCIAL

## 2024-07-31 DIAGNOSIS — E03.9 HYPOTHYROIDISM, ACQUIRED: ICD-10-CM

## 2024-07-31 DIAGNOSIS — K59.09 CHRONIC CONSTIPATION: ICD-10-CM

## 2024-07-31 DIAGNOSIS — R89.9 ABNORMAL LABORATORY TEST: Primary | ICD-10-CM

## 2024-07-31 DIAGNOSIS — D50.9 IRON DEFICIENCY ANEMIA, UNSPECIFIED IRON DEFICIENCY ANEMIA TYPE: ICD-10-CM

## 2024-07-31 PROCEDURE — 99309 SBSQ NF CARE MODERATE MDM 30: CPT

## 2024-07-31 NOTE — ASSESSMENT & PLAN NOTE
Abdominal XR showed a large amount of stool burden. Increasing linzess dosing for relief. She is also on miralax daily

## 2024-07-31 NOTE — LETTER
Patient: Regi Leger  : 10/10/1930    Encounter Date: 2024    Visit  Note   Subjective  Regi Leger is a 89 y.o. female who is being seen and evaluated for multiple medical problems. Nursing notes, vital signs, and labs were reviewed in the local facility chart.  No chief complaint on file.     Patient was evaluated today for a follow-up on abnormal labs and a follow-up on her abdominal x-ray.  On examination today she has no complaints or medical team.       Objective  LMP  (LMP Unknown)   Physical Exam  Vitals reviewed.   Constitutional:       Appearance: Normal appearance.   HENT:      Head: Normocephalic.   Cardiovascular:      Rate and Rhythm: Normal rate and regular rhythm.   Pulmonary:      Breath sounds: Normal breath sounds.   Abdominal:      General: Bowel sounds are normal. There is distension.      Palpations: There is no mass.      Tenderness: There is abdominal tenderness. There is no guarding.      Hernia: No hernia is present.      Comments: LUQ/LLQ tenderness to palpation   Musculoskeletal:      Cervical back: Neck supple.   Skin:     General: Skin is warm and dry.   Neurological:      Mental Status: She is alert.       Assessment/Plan  Assessment & Plan  Abnormal laboratory test  TSH level; see plan below. Her CBC differential is abnormal; will defer to oncology for any further work up; GI will need to be consulted if patient agrees to work up for previous CT finding.        Chronic constipation  Abdominal XR showed a large amount of stool burden. Increasing linzess dosing for relief. She is also on miralax daily          Hypothyroidism, acquired  TSH is 15.382; increased synthroid to 75 mcg MWF; repeat TSH T3 T4 levels in 6 weeks planned.          Iron deficiency anemia, unspecified iron deficiency anemia type  Repeat hemoglobin level came back 12 range. Continue iron supplementation and we will continue to monitor periodically.             Addendum: Nursing staff reviewed case with  her nephew. She does not wish to pursue any further treatment options at this time. We will continue supportive care.     Please excuse any errors in grammar or translation related to this dictation. Voice recognition software was utilized to prepare this document.       Electronically Signed By: NEFTALY Barnett   8/1/24 12:34 PM

## 2024-07-31 NOTE — ASSESSMENT & PLAN NOTE
Repeat hemoglobin level came back 12 range. Continue iron supplementation and we will continue to monitor periodically.

## 2024-07-31 NOTE — ASSESSMENT & PLAN NOTE
TSH is 15.382; increased synthroid to 75 mcg MWF; repeat TSH T3 T4 levels in 6 weeks planned.

## 2024-07-31 NOTE — PROGRESS NOTES
Visit  Note   Subjective   Regi Leger is a 89 y.o. female who is being seen and evaluated for multiple medical problems. Nursing notes, vital signs, and labs were reviewed in the local facility chart.  No chief complaint on file.     Patient was evaluated today for a follow-up on abnormal labs and a follow-up on her abdominal x-ray.  On examination today she has no complaints or medical team.       Objective   LMP  (LMP Unknown)   Physical Exam  Vitals reviewed.   Constitutional:       Appearance: Normal appearance.   HENT:      Head: Normocephalic.   Cardiovascular:      Rate and Rhythm: Normal rate and regular rhythm.   Pulmonary:      Breath sounds: Normal breath sounds.   Abdominal:      General: Bowel sounds are normal. There is distension.      Palpations: There is no mass.      Tenderness: There is abdominal tenderness. There is no guarding.      Hernia: No hernia is present.      Comments: LUQ/LLQ tenderness to palpation   Musculoskeletal:      Cervical back: Neck supple.   Skin:     General: Skin is warm and dry.   Neurological:      Mental Status: She is alert.       Assessment/Plan   Assessment & Plan  Abnormal laboratory test  TSH level; see plan below. Her CBC differential is abnormal; will defer to oncology for any further work up; GI will need to be consulted if patient agrees to work up for previous CT finding.        Chronic constipation  Abdominal XR showed a large amount of stool burden. Increasing linzess dosing for relief. She is also on miralax daily          Hypothyroidism, acquired  TSH is 15.382; increased synthroid to 75 mcg MWF; repeat TSH T3 T4 levels in 6 weeks planned.          Iron deficiency anemia, unspecified iron deficiency anemia type  Repeat hemoglobin level came back 12 range. Continue iron supplementation and we will continue to monitor periodically.             Addendum: Nursing staff reviewed case with her nephew. She does not wish to pursue any further treatment options  at this time. We will continue supportive care.     Please excuse any errors in grammar or translation related to this dictation. Voice recognition software was utilized to prepare this document.

## 2024-08-01 DIAGNOSIS — R11.0 NAUSEA: ICD-10-CM

## 2024-08-01 DIAGNOSIS — M17.0 PRIMARY OSTEOARTHRITIS OF BOTH KNEES: ICD-10-CM

## 2024-08-01 DIAGNOSIS — K21.9 GASTROESOPHAGEAL REFLUX DISEASE WITHOUT ESOPHAGITIS: ICD-10-CM

## 2024-08-01 DIAGNOSIS — I10 HTN (HYPERTENSION), BENIGN: ICD-10-CM

## 2024-08-01 DIAGNOSIS — J30.9 ALLERGIC RHINITIS, UNSPECIFIED SEASONALITY, UNSPECIFIED TRIGGER: ICD-10-CM

## 2024-08-01 DIAGNOSIS — K59.09 CHRONIC CONSTIPATION: ICD-10-CM

## 2024-08-01 DIAGNOSIS — N30.00 ACUTE CYSTITIS WITHOUT HEMATURIA: ICD-10-CM

## 2024-08-01 DIAGNOSIS — Z85.038 HISTORY OF MALIGNANT NEOPLASM OF COLON: Chronic | ICD-10-CM

## 2024-08-01 DIAGNOSIS — G47.00 INSOMNIA, UNSPECIFIED TYPE: ICD-10-CM

## 2024-08-01 DIAGNOSIS — C50.912 MALIGNANT NEOPLASM OF LEFT FEMALE BREAST, UNSPECIFIED ESTROGEN RECEPTOR STATUS, UNSPECIFIED SITE OF BREAST (MULTI): ICD-10-CM

## 2024-08-01 DIAGNOSIS — R05.3 CHRONIC COUGH: ICD-10-CM

## 2024-08-01 DIAGNOSIS — E03.9 HYPOTHYROIDISM, ACQUIRED: Primary | ICD-10-CM

## 2024-08-01 DIAGNOSIS — E87.6 HYPOKALEMIA: ICD-10-CM

## 2024-08-01 DIAGNOSIS — E78.5 HYPERLIPIDEMIA, UNSPECIFIED HYPERLIPIDEMIA TYPE: ICD-10-CM

## 2024-08-01 DIAGNOSIS — D50.9 IRON DEFICIENCY ANEMIA, UNSPECIFIED IRON DEFICIENCY ANEMIA TYPE: ICD-10-CM

## 2024-08-01 RX ORDER — ASPIRIN 81 MG/1
81 TABLET ORAL DAILY
Qty: 90 TABLET | Refills: 3 | Status: SHIPPED | OUTPATIENT
Start: 2024-08-01 | End: 2025-08-01

## 2024-08-01 RX ORDER — ANASTROZOLE 1 MG/1
1 TABLET ORAL DAILY
Qty: 90 TABLET | Refills: 3 | Status: SHIPPED | OUTPATIENT
Start: 2024-08-01 | End: 2025-08-01

## 2024-08-01 RX ORDER — AMLODIPINE BESYLATE 10 MG/1
10 TABLET ORAL DAILY
Qty: 90 TABLET | Refills: 3 | Status: SHIPPED | OUTPATIENT
Start: 2024-08-01 | End: 2025-08-01

## 2024-08-01 RX ORDER — ATORVASTATIN CALCIUM 20 MG/1
20 TABLET, FILM COATED ORAL DAILY
Qty: 90 TABLET | Refills: 3 | Status: SHIPPED | OUTPATIENT
Start: 2024-08-01 | End: 2025-08-01

## 2024-08-01 RX ORDER — POLYETHYLENE GLYCOL 3350 17 G/17G
17 POWDER, FOR SOLUTION ORAL DAILY
Qty: 90 PACKET | Refills: 3 | Status: SHIPPED | OUTPATIENT
Start: 2024-08-01 | End: 2025-08-01

## 2024-08-01 RX ORDER — GUAIFENESIN 600 MG/1
600 TABLET, EXTENDED RELEASE ORAL 2 TIMES DAILY
Qty: 60 TABLET | Refills: 11 | Status: SHIPPED | OUTPATIENT
Start: 2024-08-01 | End: 2025-08-01

## 2024-08-01 RX ORDER — PRIMIDONE 50 MG/1
50 TABLET ORAL 3 TIMES DAILY
Qty: 270 TABLET | Refills: 3 | Status: SHIPPED | OUTPATIENT
Start: 2024-08-01 | End: 2025-08-01

## 2024-08-01 RX ORDER — MELATONIN 3 MG
3 CAPSULE ORAL NIGHTLY
Qty: 90 CAPSULE | Refills: 3 | Status: SHIPPED | OUTPATIENT
Start: 2024-08-01 | End: 2025-08-01

## 2024-08-01 RX ORDER — DEXTROMETHORPHAN HYDROBROMIDE, GUAIFENESIN 5; 100 MG/5ML; MG/5ML
650 LIQUID ORAL 2 TIMES DAILY
Qty: 180 TABLET | Refills: 3 | Status: SHIPPED | OUTPATIENT
Start: 2024-08-01 | End: 2025-08-01

## 2024-08-01 RX ORDER — DEXTROMETHORPHAN HYDROBROMIDE, GUAIFENESIN 5; 100 MG/5ML; MG/5ML
650 LIQUID ORAL EVERY 8 HOURS PRN
Qty: 30 TABLET | Refills: 0 | Status: SHIPPED | OUTPATIENT
Start: 2024-08-01 | End: 2024-08-01 | Stop reason: SDUPTHER

## 2024-08-01 RX ORDER — FUROSEMIDE 20 MG/1
20 TABLET ORAL DAILY
Qty: 30 TABLET | Refills: 11 | Status: SHIPPED | OUTPATIENT
Start: 2024-08-01 | End: 2025-08-01

## 2024-08-01 RX ORDER — POTASSIUM CHLORIDE 750 MG/1
10 TABLET, FILM COATED, EXTENDED RELEASE ORAL 2 TIMES DAILY
Qty: 180 TABLET | Refills: 3 | Status: SHIPPED | OUTPATIENT
Start: 2024-08-01 | End: 2025-08-01

## 2024-08-01 RX ORDER — LIDOCAINE 50 MG/G
2 PATCH TOPICAL DAILY
Qty: 60 PATCH | Refills: 11 | Status: SHIPPED | OUTPATIENT
Start: 2024-08-01 | End: 2025-08-01

## 2024-08-01 RX ORDER — CALCIUM CARBONATE 200(500)MG
1 TABLET,CHEWABLE ORAL 3 TIMES DAILY
Qty: 270 TABLET | Refills: 3 | Status: SHIPPED | OUTPATIENT
Start: 2024-08-01 | End: 2025-08-01

## 2024-08-01 RX ORDER — ONDANSETRON 8 MG/1
8 TABLET, ORALLY DISINTEGRATING ORAL EVERY 6 HOURS PRN
Qty: 20 TABLET | Refills: 3 | Status: SHIPPED | OUTPATIENT
Start: 2024-08-01 | End: 2024-08-31

## 2024-08-01 RX ORDER — PANTOPRAZOLE SODIUM 40 MG/1
40 TABLET, DELAYED RELEASE ORAL DAILY
Qty: 30 TABLET | Refills: 1 | Status: SHIPPED | OUTPATIENT
Start: 2024-08-01 | End: 2024-09-30

## 2024-08-01 RX ORDER — MELOXICAM 15 MG/1
15 TABLET ORAL DAILY
Qty: 30 TABLET | Refills: 11 | Status: SHIPPED | OUTPATIENT
Start: 2024-08-01 | End: 2025-08-01

## 2024-08-01 RX ORDER — LEVOTHYROXINE SODIUM 75 UG/1
75 TABLET ORAL DAILY
Qty: 90 TABLET | Refills: 0 | Status: SHIPPED | OUTPATIENT
Start: 2024-08-01 | End: 2024-10-30

## 2024-08-01 RX ORDER — FERROUS SULFATE 325(65) MG
325 TABLET, DELAYED RELEASE (ENTERIC COATED) ORAL 2 TIMES DAILY
Qty: 180 TABLET | Refills: 3 | Status: SHIPPED | OUTPATIENT
Start: 2024-08-01 | End: 2025-08-01

## 2024-08-01 RX ORDER — AMOXICILLIN 250 MG
1 CAPSULE ORAL EVERY 12 HOURS
Qty: 180 TABLET | Refills: 3 | Status: SHIPPED | OUTPATIENT
Start: 2024-08-01 | End: 2025-08-01

## 2024-08-01 RX ORDER — FLUTICASONE FUROATE 27.5 UG/1
1 SPRAY, METERED NASAL
Qty: 10 G | Refills: 5 | Status: SHIPPED | OUTPATIENT
Start: 2024-08-01 | End: 2024-08-09 | Stop reason: WASHOUT

## 2024-08-02 ENCOUNTER — TELEPHONE (OUTPATIENT)
Dept: PRIMARY CARE | Facility: CLINIC | Age: 89
End: 2024-08-02
Payer: COMMERCIAL

## 2024-08-02 NOTE — TELEPHONE ENCOUNTER
I placed a phone call to Jesus JOHN for Regi Leger.   Apparently he switched her linzess Rx form the local [facility] pharmacy to [facility] and this resulted in a duplication of meds dispensed.  It was magnified because we increased her dose and sent another new Rx for the higher dose to [facility].  I advised that we can use up the lower dose by taking 2 at a time so they are not wasted and then switch to the higher dose    I then spoke to the nurse at  who said they already sent the lower dose [facility] for a credit so we will continue the current dose

## 2024-08-05 ENCOUNTER — NURSING HOME VISIT (OUTPATIENT)
Dept: POST ACUTE CARE | Facility: EXTERNAL LOCATION | Age: 89
End: 2024-08-05
Payer: COMMERCIAL

## 2024-08-05 DIAGNOSIS — K59.09 CHRONIC CONSTIPATION: ICD-10-CM

## 2024-08-05 DIAGNOSIS — I12.9 HYPERTENSIVE KIDNEY DISEASE WITH STAGE 3 CHRONIC KIDNEY DISEASE, UNSPECIFIED WHETHER STAGE 3A OR 3B CKD (MULTI): ICD-10-CM

## 2024-08-05 DIAGNOSIS — C50.912 MALIGNANT NEOPLASM OF LEFT FEMALE BREAST, UNSPECIFIED ESTROGEN RECEPTOR STATUS, UNSPECIFIED SITE OF BREAST (MULTI): Primary | ICD-10-CM

## 2024-08-05 DIAGNOSIS — R53.1 GENERALIZED WEAKNESS: ICD-10-CM

## 2024-08-05 DIAGNOSIS — N18.30 HYPERTENSIVE KIDNEY DISEASE WITH STAGE 3 CHRONIC KIDNEY DISEASE, UNSPECIFIED WHETHER STAGE 3A OR 3B CKD (MULTI): ICD-10-CM

## 2024-08-05 DIAGNOSIS — E03.9 HYPOTHYROIDISM, ACQUIRED: ICD-10-CM

## 2024-08-05 DIAGNOSIS — E78.5 HYPERLIPIDEMIA, UNSPECIFIED HYPERLIPIDEMIA TYPE: ICD-10-CM

## 2024-08-05 PROCEDURE — 99309 SBSQ NF CARE MODERATE MDM 30: CPT

## 2024-08-05 NOTE — ASSESSMENT & PLAN NOTE
TSH is 15.382; increased synthroid to 75 mcg daily; repeat TSH T3 T4 levels in 6 weeks planned.

## 2024-08-05 NOTE — LETTER
Patient: Regi Leger  : 10/10/1930    Encounter Date: 2024    Visit  Note   Subjective  Regi Leger is a 89 y.o. female who is being seen at [facility] and evaluated for multiple medical problems. Nursing notes, vital signs, and labs were reviewed in the local facility chart.  No chief complaint on file.     Patient requested to be evaluated today for review of her medications. On examination today, she is mostly complaining of the food. She has refused her medications more frequently recently and she tells me she doesn't want to take them anymore. No other concerns on examination today.        Objective  LMP  (LMP Unknown)   Physical Exam  Vitals reviewed.   Constitutional:       Appearance: Normal appearance.   HENT:      Head: Normocephalic.   Cardiovascular:      Rate and Rhythm: Normal rate and regular rhythm.   Pulmonary:      Effort: Pulmonary effort is normal. No respiratory distress.      Breath sounds: Normal breath sounds. No wheezing, rhonchi or rales.   Abdominal:      General: Bowel sounds are normal.   Musculoskeletal:      Cervical back: Neck supple.   Skin:     General: Skin is warm and dry.   Neurological:      Mental Status: She is alert.       Assessment/Plan  Assessment & Plan  Malignant neoplasm of left female breast, unspecified estrogen receptor status, unspecified site of breast (Multi)  she is being treated with hormonal therapy with Anastrozole beginning Aug 2020; follows with Dr. Mancilla          Chronic constipation  She is on miralax and linzess daily; we adjusted her senna to PRN.          Generalized weakness  She has transferred from [facility] to [facility] for ongoing nursing care as she was unable to care for herself at home and her nephew was unable to help her.          Hyperlipidemia, unspecified hyperlipidemia type  Continue statin therapy         Hypertensive kidney disease with stage 3 chronic kidney disease, unspecified whether stage 3a or 3b CKD  (Multi)  Reviewed labs; stable creatinine 0.9 and gfr 58         Hypothyroidism, acquired  TSH is 15.382; increased synthroid to 75 mcg daily; repeat TSH T3 T4 levels in 6 weeks planned.          We discussed all of her medications today however the only specific complaint she had was taking a memory pill and she is not on any medications for dementia and/or Alzheimer's currently. We placed her senna on PRN. We will continue current medication regimen; she has the right to refuse her medications if she does not want to take them.  We are planning on hospice evaluation; they were consulted today.      Please excuse any errors in grammar or translation related to this dictation. Voice recognition software was utilized to prepare this document.       Electronically Signed By: NEFTALY Barnett   8/5/24  3:12 PM

## 2024-08-05 NOTE — ASSESSMENT & PLAN NOTE
She has transferred from [facility] to [facility] for ongoing nursing care as she was unable to care for herself at home and her nephew was unable to help her.

## 2024-08-05 NOTE — PROGRESS NOTES
Visit  Note   Subjective   Regi Leger is a 89 y.o. female who is being seen at [facility] and evaluated for multiple medical problems. Nursing notes, vital signs, and labs were reviewed in the local facility chart.  No chief complaint on file.     Patient requested to be evaluated today for review of her medications. On examination today, she is mostly complaining of the food. She has refused her medications more frequently recently and she tells me she doesn't want to take them anymore. No other concerns on examination today.        Objective   LMP  (LMP Unknown)   Physical Exam  Vitals reviewed.   Constitutional:       Appearance: Normal appearance.   HENT:      Head: Normocephalic.   Cardiovascular:      Rate and Rhythm: Normal rate and regular rhythm.   Pulmonary:      Effort: Pulmonary effort is normal. No respiratory distress.      Breath sounds: Normal breath sounds. No wheezing, rhonchi or rales.   Abdominal:      General: Bowel sounds are normal.   Musculoskeletal:      Cervical back: Neck supple.   Skin:     General: Skin is warm and dry.   Neurological:      Mental Status: She is alert.       Assessment/Plan   Assessment & Plan  Malignant neoplasm of left female breast, unspecified estrogen receptor status, unspecified site of breast (Multi)  she is being treated with hormonal therapy with Anastrozole beginning Aug 2020; follows with Dr. Mancilla          Chronic constipation  She is on miralax and linzess daily; we adjusted her senna to PRN.          Generalized weakness  She has transferred from [facility] to [facility] for ongoing nursing care as she was unable to care for herself at home and her nephew was unable to help her.          Hyperlipidemia, unspecified hyperlipidemia type  Continue statin therapy         Hypertensive kidney disease with stage 3 chronic kidney disease, unspecified whether stage 3a or 3b CKD (Multi)  Reviewed labs; stable creatinine 0.9 and gfr 58          Hypothyroidism, acquired  TSH is 15.382; increased synthroid to 75 mcg daily; repeat TSH T3 T4 levels in 6 weeks planned.          We discussed all of her medications today however the only specific complaint she had was taking a memory pill and she is not on any medications for dementia and/or Alzheimer's currently. We placed her senna on PRN. We will continue current medication regimen; she has the right to refuse her medications if she does not want to take them.  We are planning on hospice evaluation; they were consulted today.      Please excuse any errors in grammar or translation related to this dictation. Voice recognition software was utilized to prepare this document.

## 2024-08-09 DIAGNOSIS — J30.9 ALLERGIC RHINITIS, UNSPECIFIED SEASONALITY, UNSPECIFIED TRIGGER: ICD-10-CM

## 2024-08-09 RX ORDER — FLUTICASONE PROPIONATE 50 MCG
1 SPRAY, SUSPENSION (ML) NASAL DAILY
Qty: 16 G | Refills: 12 | Status: SHIPPED | OUTPATIENT
Start: 2024-08-09 | End: 2025-08-09

## 2024-08-21 ENCOUNTER — TELEPHONE (OUTPATIENT)
Dept: PRIMARY CARE | Facility: CLINIC | Age: 89
End: 2024-08-21
Payer: COMMERCIAL

## 2024-08-21 NOTE — TELEPHONE ENCOUNTER
Patients son called Dr Castro office this morning very upset.  He states that on the [employer] Website, someone ordered Potassium Chloride today to a pharmacy that is not the one that they use.   He states that she uses [facility] and that a Rx was sent on 08/01/24 and  he dropped off 2 bottles of this medication earlier this month.  He is very concerned that someone lost this medication or is ordering excess medication in his mothers name.   I advised him to call the Nursing Home and either speak with you or the nursing supervisor.  He states he will be going there today to discuss.

## 2024-09-17 ENCOUNTER — NURSING HOME VISIT (OUTPATIENT)
Dept: POST ACUTE CARE | Facility: EXTERNAL LOCATION | Age: 89
End: 2024-09-17
Payer: COMMERCIAL

## 2024-09-17 VITALS
WEIGHT: 128.4 LBS | SYSTOLIC BLOOD PRESSURE: 100 MMHG | HEART RATE: 62 BPM | OXYGEN SATURATION: 97 % | RESPIRATION RATE: 20 BRPM | BODY MASS INDEX: 19.02 KG/M2 | TEMPERATURE: 97 F | HEIGHT: 69 IN | DIASTOLIC BLOOD PRESSURE: 50 MMHG

## 2024-09-17 DIAGNOSIS — Z51.5 HOSPICE CARE: ICD-10-CM

## 2024-09-17 DIAGNOSIS — G89.29 OTHER CHRONIC PAIN: ICD-10-CM

## 2024-09-17 DIAGNOSIS — K59.09 CHRONIC CONSTIPATION: ICD-10-CM

## 2024-09-17 DIAGNOSIS — Z00.00 ROUTINE GENERAL MEDICAL EXAMINATION AT HEALTH CARE FACILITY: Primary | ICD-10-CM

## 2024-09-17 PROCEDURE — 99309 SBSQ NF CARE MODERATE MDM 30: CPT

## 2024-09-17 ASSESSMENT — PATIENT HEALTH QUESTIONNAIRE - PHQ9
7. TROUBLE CONCENTRATING ON THINGS, SUCH AS READING THE NEWSPAPER OR WATCHING TELEVISION: NOT AT ALL
2. FEELING DOWN, DEPRESSED OR HOPELESS: MORE THAN HALF THE DAYS
3. TROUBLE FALLING OR STAYING ASLEEP OR SLEEPING TOO MUCH: MORE THAN HALF THE DAYS
10. IF YOU CHECKED OFF ANY PROBLEMS, HOW DIFFICULT HAVE THESE PROBLEMS MADE IT FOR YOU TO DO YOUR WORK, TAKE CARE OF THINGS AT HOME, OR GET ALONG WITH OTHER PEOPLE: VERY DIFFICULT
4. FEELING TIRED OR HAVING LITTLE ENERGY: MORE THAN HALF THE DAYS
SUM OF ALL RESPONSES TO PHQ9 QUESTIONS 1 AND 2: 3
5. POOR APPETITE OR OVEREATING: NOT AT ALL
6. FEELING BAD ABOUT YOURSELF - OR THAT YOU ARE A FAILURE OR HAVE LET YOURSELF OR YOUR FAMILY DOWN: NOT AT ALL
9. THOUGHTS THAT YOU WOULD BE BETTER OFF DEAD, OR OF HURTING YOURSELF: NOT AT ALL
SUM OF ALL RESPONSES TO PHQ QUESTIONS 1-9: 7
1. LITTLE INTEREST OR PLEASURE IN DOING THINGS: SEVERAL DAYS
8. MOVING OR SPEAKING SO SLOWLY THAT OTHER PEOPLE COULD HAVE NOTICED. OR THE OPPOSITE, BEING SO FIGETY OR RESTLESS THAT YOU HAVE BEEN MOVING AROUND A LOT MORE THAN USUAL: NOT AT ALL

## 2024-09-17 ASSESSMENT — ACTIVITIES OF DAILY LIVING (ADL)
TAKING_MEDICATION: TOTAL CARE
DOING_HOUSEWORK: TOTAL CARE
GROCERY_SHOPPING: TOTAL CARE
DRESSING: DEPENDENT
MANAGING_FINANCES: TOTAL CARE
BATHING: DEPENDENT

## 2024-09-17 ASSESSMENT — ENCOUNTER SYMPTOMS
DEPRESSION: 1
LOSS OF SENSATION IN FEET: 0
OCCASIONAL FEELINGS OF UNSTEADINESS: 1

## 2024-09-17 ASSESSMENT — PAIN SCALES - GENERAL: PAINLEVEL: 0-NO PAIN

## 2024-09-17 NOTE — LETTER
"Patient: Regi Leger  : 10/10/1930    Encounter Date: 2024    Subjective  Reason for Visit: Regi Leger is an 89 y.o. female with a a Phx of malignant neoplasm of left breast, chronic constipation, generalized weakness, HLD, HTN, CKD III, and hypothyroidism here for a Medicare Wellness visit. Is on hospice.    Patient reports chronic constipation daily, pain in her stomach and rectum, and pain in her legs and feet bilaterally. She says the pain in her feet and legs are a burning sensation that happen every day and inhibit her from sleeping. She often is not able to get adequate sleep. She otherwise has no complaints.    Of note, spoke with nurse and he stated that she has been refusing all of her medications daily because she thinks they are being poisoned. From last facility, this occurred there too.    HPI    Patient Care Team:  Shira Garcia MD as PCP - General  Agnes Marley MD as PCP - [facility] PCP  Tim Mancilla MD as Consulting Physician (Hematology and Oncology)     Review of Systems    Objective  Vitals:  /50 (BP Location: Left arm, Patient Position: Lying, BP Cuff Size: Adult)   Pulse 62   Temp 36.1 °C (97 °F) (Oral)   Resp 20   Ht 1.753 m (5' 9.02\")   Wt 58.2 kg (128 lb 6.4 oz)   LMP  (LMP Unknown)   SpO2 97%   BMI 18.95 kg/m²       Physical Exam  Constitutional:       General: She is in acute distress.      Appearance: Normal appearance.   HENT:      Head: Normocephalic and atraumatic.   Cardiovascular:      Rate and Rhythm: Normal rate and regular rhythm.      Heart sounds: No murmur heard.  Pulmonary:      Effort: Pulmonary effort is normal. No respiratory distress.      Breath sounds: Normal breath sounds.   Musculoskeletal:         General: No swelling or tenderness.      Right lower leg: No edema.      Left lower leg: No edema.   Skin:     General: Skin is warm and dry.      Findings: No rash.   Neurological:      Mental Status: She is alert.       Assessment & " Plan  Routine general medical examination at health care facility    Orders:  •  1 Year Follow Up In Primary Care - Wellness Exam; Future    Chronic constipation  - on padmaja-colace BID and miralax daily, PRN milk of magnesia, patient states that she is constipated daily. Reviewed nursing notes and did not note constipation issues. Spoke to nurse and she has been refusing medications daily, this also occurred at her last facility. She thinks she is being poisoned through medications.  - patient reports stomach pain and rectal pain daily from the constipation  - continue to monitor nursing notes, hopefully patient will adjust and become more trusting to take her medications for symptom relief       Other chronic pain  - pain in legs and feet daily, burning sensation  - pain inhibits her from sleeping properly, takes 3 mg of melatonin nightly  - on morphine, 0.5 mL every 2 hours as needed for moderate pain  - ativan 0.5 mg every 4 hours as needed for agitation, restlessness, and anxiety  - continue to offer pain medication for symptom relief       Hospice care  - on ativan, morphine, constipation medications  - signed onto hospice 2 weeks ago for breast cancer                   Attestation signed by Arturo Hong MD at 9/17/2024  6:23 PM:  I saw and evaluated the patient. I personally obtained the key and critical portions of the history and physical exam or was physically present for key and critical portions performed by the resident/fellow. I reviewed the resident/fellow's documentation and discussed the patient with the resident/fellow. I agree with the resident/fellow's medical decision making as documented in the note.    Electronically Signed By: Ruthy Jennings DO   9/17/24  3:20 PM

## 2024-09-17 NOTE — ASSESSMENT & PLAN NOTE
- on padmaja-colace BID and miralax daily, PRN milk of magnesia, patient states that she is constipated daily. Reviewed nursing notes and did not note constipation issues. Spoke to nurse and she has been refusing medications daily, this also occurred at her last facility. She thinks she is being poisoned through medications.  - patient reports stomach pain and rectal pain daily from the constipation  - continue to monitor nursing notes, hopefully patient will adjust and become more trusting to take her medications for symptom relief

## 2024-09-17 NOTE — ASSESSMENT & PLAN NOTE
- blood pressure well controlled at 100/50  - patient is on amlodipine 10 mg daily and lasix 20 mg daily  - continue with medications and monitor blood pressure

## 2024-09-17 NOTE — PROGRESS NOTES
"Subjective   Reason for Visit: Regi Leger is an 89 y.o. female with a a Phx of malignant neoplasm of left breast, chronic constipation, generalized weakness, HLD, HTN, CKD III, and hypothyroidism here for a Medicare Wellness visit. Is on hospice.    Patient reports chronic constipation daily, pain in her stomach and rectum, and pain in her legs and feet bilaterally. She says the pain in her feet and legs are a burning sensation that happen every day and inhibit her from sleeping. She often is not able to get adequate sleep. She otherwise has no complaints.    Of note, spoke with nurse and he stated that she has been refusing all of her medications daily because she thinks they are being poisoned. From last facility, this occurred there too.    HPI    Patient Care Team:  Shira Garcia MD as PCP - General  Agnes Marley MD as PCP - [facility] PCP  Tim Mancilla MD as Consulting Physician (Hematology and Oncology)     Review of Systems    Objective   Vitals:  /50 (BP Location: Left arm, Patient Position: Lying, BP Cuff Size: Adult)   Pulse 62   Temp 36.1 °C (97 °F) (Oral)   Resp 20   Ht 1.753 m (5' 9.02\")   Wt 58.2 kg (128 lb 6.4 oz)   LMP  (LMP Unknown)   SpO2 97%   BMI 18.95 kg/m²       Physical Exam  Constitutional:       General: She is in acute distress.      Appearance: Normal appearance.   HENT:      Head: Normocephalic and atraumatic.   Cardiovascular:      Rate and Rhythm: Normal rate and regular rhythm.      Heart sounds: No murmur heard.  Pulmonary:      Effort: Pulmonary effort is normal. No respiratory distress.      Breath sounds: Normal breath sounds.   Musculoskeletal:         General: No swelling or tenderness.      Right lower leg: No edema.      Left lower leg: No edema.   Skin:     General: Skin is warm and dry.      Findings: No rash.   Neurological:      Mental Status: She is alert.       Assessment & Plan  Routine general medical examination at [facility]" facility    Orders:    1 Year Follow Up In Primary Care - Wellness Exam; Future    Chronic constipation  - on padmaja-colace BID and miralax daily, PRN milk of magnesia, patient states that she is constipated daily. Reviewed nursing notes and did not note constipation issues. Spoke to nurse and she has been refusing medications daily, this also occurred at her last facility. She thinks she is being poisoned through medications.  - patient reports stomach pain and rectal pain daily from the constipation  - continue to monitor nursing notes, hopefully patient will adjust and become more trusting to take her medications for symptom relief       Other chronic pain  - pain in legs and feet daily, burning sensation  - pain inhibits her from sleeping properly, takes 3 mg of melatonin nightly  - on morphine, 0.5 mL every 2 hours as needed for moderate pain  - ativan 0.5 mg every 4 hours as needed for agitation, restlessness, and anxiety  - continue to offer pain medication for symptom relief       Hospice care  - on ativan, morphine, constipation medications  - signed onto hospice 2 weeks ago for breast cancer

## 2024-09-17 NOTE — ASSESSMENT & PLAN NOTE
Lab Results   Component Value Date    TSH 10.86 (H) 10/12/2023   - last TSH was 10/12/23  - repeat TSH  - on Synthroid 75 mcg daily  - continue medications

## 2024-10-08 ENCOUNTER — NURSING HOME VISIT (OUTPATIENT)
Dept: POST ACUTE CARE | Facility: EXTERNAL LOCATION | Age: 89
End: 2024-10-08
Payer: COMMERCIAL

## 2024-10-08 DIAGNOSIS — Z51.5 HOSPICE CARE PATIENT: ICD-10-CM

## 2024-10-08 DIAGNOSIS — K59.09 CHRONIC CONSTIPATION: Primary | ICD-10-CM

## 2024-10-08 PROBLEM — R42 POSITIONAL LIGHTHEADEDNESS: Status: RESOLVED | Noted: 2024-01-08 | Resolved: 2024-10-08

## 2024-10-08 PROCEDURE — 99309 SBSQ NF CARE MODERATE MDM 30: CPT | Performed by: FAMILY MEDICINE

## 2024-10-08 NOTE — LETTER
Patient: Regi Leger  : 10/10/1930    Encounter Date: 10/08/2024    Visit  Note   Subjective  Regi Leger is a 89 y.o. female who is being seen at [facility] and evaluated for multiple medical problems. Nursing notes, vital signs, and labs were reviewed in the local facility chart.  No chief complaint on file.     HPI   I was asked to evaluate this patient by her family.  Apparently she has been complaining of a fullness or a weight sensation on the belly today.  When I questioned the patient she says this has been going on for weeks but she cannot be clear on the timing.  She also states that she is feeling short of breath although there has been no fever.  Nursing staff report oxygen saturation 98% on room air today and no evidence of tachypnea.  Objective  LMP  (LMP Unknown)   Physical Exam  Constitutional:       Appearance: Normal appearance.   HENT:      Head: Normocephalic.   Eyes:      Conjunctiva/sclera: Conjunctivae normal.   Cardiovascular:      Rate and Rhythm: Normal rate and regular rhythm.      Heart sounds: Normal heart sounds.   Pulmonary:      Effort: Pulmonary effort is normal.      Breath sounds: Normal breath sounds.   Abdominal:      General: Abdomen is flat.      Palpations: Abdomen is soft.      Comments: There is no distinct tenderness but the belly is mildly distended and very hypertympanic to percussion.   Musculoskeletal:      Cervical back: Neck supple.   Skin:     General: Skin is warm and dry.   Neurological:      Mental Status: She is alert.       Assessment/Plan  Assessment & Plan  Chronic constipation  This 89-year-old female is complaining of a full heavy sensation on her belly.  Fortunately, the belly is soft without any signs of acute abdomen or obstruction but she does have a history of constipation and she has been refusing all of her medicines including her fiber supplement for quite some time.  She appears to be paranoid that they are not the correct medications.y  "or th may be \"poison\".  She continues to refuse although staff have reassured her repeatedly that these are the correct medications.  Her exam does reveal a hypertympanic somewhat distended belly so we will get an x-ray to evaluate how much air she is retaining and rule out a partial small bowel obstruction or any other so we will get an x-ray to evaluate how much air she is retaining and rule out a partial small bowel obstruction or any other abdominal pathology.  I have strongly urged her to take medications as prescribed.       Hospice care patient  Nursing staff and family tell me that she continues to  [place] was essentially all of her medications including her stool softeners.  She has requested hospice care for her breast cancer diagnosis.  We will continue to reassure and answer her questions as she would certainly to benefit from the symptom control medications that have been prescribed especially the stool softener.       Her nephew, POA, is present at the time of this exam and gives a significant amount of the history.  Additional information was also taken from the nurse on duty today      Please excuse any errors in grammar or translation related to this dictation. Voice recognition software was utilized to prepare this document.       Electronically Signed By: Arturo Hong MD   10/8/24  3:08 PM  "

## 2024-10-08 NOTE — ASSESSMENT & PLAN NOTE
Nursing staff and family tell me that she continues to  [place] was essentially all of her medications including her stool softeners.  She has requested hospice care for her breast cancer diagnosis.  We will continue to reassure and answer her questions as she would certainly to benefit from the symptom control medications that have been prescribed especially the stool softener.

## 2024-10-08 NOTE — PROGRESS NOTES
"Visit  Note   Subjective   Regi Leger is a 89 y.o. female who is being seen at [facility] and evaluated for multiple medical problems. Nursing notes, vital signs, and labs were reviewed in the local facility chart.  No chief complaint on file.     HPI   I was asked to evaluate this patient by her family.  Apparently she has been complaining of a fullness or a weight sensation on the belly today.  When I questioned the patient she says this has been going on for weeks but she cannot be clear on the timing.  She also states that she is feeling short of breath although there has been no fever.  Nursing staff report oxygen saturation 98% on room air today and no evidence of tachypnea.  Objective   LMP  (LMP Unknown)   Physical Exam  Constitutional:       Appearance: Normal appearance.   HENT:      Head: Normocephalic.   Eyes:      Conjunctiva/sclera: Conjunctivae normal.   Cardiovascular:      Rate and Rhythm: Normal rate and regular rhythm.      Heart sounds: Normal heart sounds.   Pulmonary:      Effort: Pulmonary effort is normal.      Breath sounds: Normal breath sounds.   Abdominal:      General: Abdomen is flat.      Palpations: Abdomen is soft.      Comments: There is no distinct tenderness but the belly is mildly distended and very hypertympanic to percussion.   Musculoskeletal:      Cervical back: Neck supple.   Skin:     General: Skin is warm and dry.   Neurological:      Mental Status: She is alert.       Assessment/Plan   Assessment & Plan  Chronic constipation  This 89-year-old female is complaining of a full heavy sensation on her belly.  Fortunately, the belly is soft without any signs of acute abdomen or obstruction but she does have a history of constipation and she has been refusing all of her medicines including her fiber supplement for quite some time.  She appears to be paranoid that they are not the correct medications.y or th may be \"poison\".  She continues to refuse although staff have " reassured her repeatedly that these are the correct medications.  Her exam does reveal a hypertympanic somewhat distended belly so we will get an x-ray to evaluate how much air she is retaining and rule out a partial small bowel obstruction or any other so we will get an x-ray to evaluate how much air she is retaining and rule out a partial small bowel obstruction or any other abdominal pathology.  I have strongly urged her to take medications as prescribed.       Hospice care patient  Nursing staff and family tell me that she continues to  [place] was essentially all of her medications including her stool softeners.  She has requested hospice care for her breast cancer diagnosis.  We will continue to reassure and answer her questions as she would certainly to benefit from the symptom control medications that have been prescribed especially the stool softener.       Her nephew, POA, is present at the time of this exam and gives a significant amount of the history.  Additional information was also taken from the nurse on duty today      Please excuse any errors in grammar or translation related to this dictation. Voice recognition software was utilized to prepare this document.

## 2024-10-08 NOTE — ASSESSMENT & PLAN NOTE
"This 89-year-old female is complaining of a full heavy sensation on her belly.  Fortunately, the belly is soft without any signs of acute abdomen or obstruction but she does have a history of constipation and she has been refusing all of her medicines including her fiber supplement for quite some time.  She appears to be paranoid that they are not the correct medications.y or th may be \"poison\".  She continues to refuse although staff have reassured her repeatedly that these are the correct medications.  Her exam does reveal a hypertympanic somewhat distended belly so we will get an x-ray to evaluate how much air she is retaining and rule out a partial small bowel obstruction or any other so we will get an x-ray to evaluate how much air she is retaining and rule out a partial small bowel obstruction or any other abdominal pathology.  I have strongly urged her to take medications as prescribed.       "

## 2024-10-15 ENCOUNTER — NURSING HOME VISIT (OUTPATIENT)
Dept: POST ACUTE CARE | Facility: EXTERNAL LOCATION | Age: 89
End: 2024-10-15

## 2024-10-15 DIAGNOSIS — K59.09 CHRONIC CONSTIPATION: ICD-10-CM

## 2024-10-15 DIAGNOSIS — Z51.5 HOSPICE CARE PATIENT: Primary | ICD-10-CM

## 2024-10-15 DIAGNOSIS — K21.9 GASTROESOPHAGEAL REFLUX DISEASE WITHOUT ESOPHAGITIS: ICD-10-CM

## 2024-10-15 PROCEDURE — 99309 SBSQ NF CARE MODERATE MDM 30: CPT

## 2024-10-15 NOTE — PROGRESS NOTES
"Visit  Note   Subjective   Regi Leger is a 89 y.o. female who is being seen at [facility] and evaluated for multiple medical problems. Nursing notes, vital signs, and labs were reviewed in the local facility chart.    Patient was in her usual mental state today. She continues to think that the facility is putting things in her food and drinks and does not trust anyone. She does not want to take any medications because of lack of trust as well. She states that she is still having abdominal pain and leg pain, but denied any interventions except leaving the facility.     HPI     Objective   LMP  (LMP Unknown)   Physical Exam  Constitutional:       Appearance: Normal appearance.   HENT:      Head: Normocephalic and atraumatic.   Cardiovascular:      Rate and Rhythm: Normal rate and regular rhythm.      Heart sounds: No murmur heard.  Pulmonary:      Effort: Pulmonary effort is normal. No respiratory distress.      Breath sounds: Normal breath sounds.   Musculoskeletal:         General: No swelling or tenderness.      Right lower leg: No edema.      Left lower leg: No edema.   Skin:     General: Skin is warm and dry.      Findings: No rash.   Neurological:      Mental Status: She is alert.       Assessment/Plan   Assessment & Plan  Hospice care patient  - on ativan, morphine, constipation medications  - signed onto hospice 2 weeks ago for breast cancer  - was refusing all medications at last 2 visits, thinks they are being \"poisoned\", continued to reassure her today about taking medications and that I am here to help, she did not agree and said she wants to leave and doesn't trust anyone here  - plan to continue to be supportive and try to gain trust  - holding off on IM antipsychotic since she is hospice patient       Chronic constipation  - has padmaja-colace BID and miralax daily, PRN milk of magnesia ordered for her constipation / abdominal pain symptoms  - she has been refusing medications daily, this also " occurred at her last facility. She thinks she is being poisoned through medications.  - continue to monitor nursing notes, hopefully patient will adjust and become more trusting to take her medications for symptom relief  - refused abdominal x ray on 10/8/24       Gastroesophageal reflux disease without esophagitis  - notes that she has heart burn, often is laying down and lays down after eating  - discussed with patient importance of sitting upright after eating to prevent heartburn, she seemed to understand but was not very agreeable

## 2024-10-15 NOTE — LETTER
"Patient: Regi Leger  : 10/10/1930    Encounter Date: 10/15/2024    Visit  Note   Subjective   Regi Leger is a 89 y.o. female who is being seen at [facility] and evaluated for multiple medical problems. Nursing notes, vital signs, and labs were reviewed in the local facility chart.    Patient was in her usual mental state today. She continues to think that the facility is putting things in her food and drinks and does not trust anyone. She does not want to take any medications because of lack of trust as well. She states that she is still having abdominal pain and leg pain, but denied any interventions except leaving the facility.     HPI     Objective   LMP  (LMP Unknown)   Physical Exam  Constitutional:       Appearance: Normal appearance.   HENT:      Head: Normocephalic and atraumatic.   Cardiovascular:      Rate and Rhythm: Normal rate and regular rhythm.      Heart sounds: No murmur heard.  Pulmonary:      Effort: Pulmonary effort is normal. No respiratory distress.      Breath sounds: Normal breath sounds.   Musculoskeletal:         General: No swelling or tenderness.      Right lower leg: No edema.      Left lower leg: No edema.   Skin:     General: Skin is warm and dry.      Findings: No rash.   Neurological:      Mental Status: She is alert.       Assessment/Plan   Assessment & Plan  Hospice care patient  - on ativan, morphine, constipation medications  - signed onto hospice 2 weeks ago for breast cancer  - was refusing all medications at last 2 visits, thinks they are being \"poisoned\", continued to reassure her today about taking medications and that I am here to help, she did not agree and said she wants to leave and doesn't trust anyone here  - plan to continue to be supportive and try to gain trust  - holding off on IM antipsychotic since she is hospice patient       Chronic constipation  - has padmaja-colace BID and miralax daily, PRN milk of magnesia ordered for her constipation / abdominal " pain symptoms  - she has been refusing medications daily, this also occurred at her last facility. She thinks she is being poisoned through medications.  - continue to monitor nursing notes, hopefully patient will adjust and become more trusting to take her medications for symptom relief  - refused abdominal x ray on 10/8/24       Gastroesophageal reflux disease without esophagitis  - notes that she has heart burn, often is laying down and lays down after eating  - discussed with patient importance of sitting upright after eating to prevent heartburn, she seemed to understand but was not very agreeable           Attestation signed by Arturo Hong MD at 10/15/2024  5:39 PM:  I saw and evaluated the patient. I personally obtained the key and critical portions of the history and physical exam or was physically present for key and critical portions performed by the resident/fellow. I reviewed the resident/fellow's documentation and discussed the patient with the resident/fellow. I agree with the resident/fellow's medical decision making as documented in the note.      Electronically Signed By: Ruthy Jennings DO   10/15/24  2:23 PM

## 2024-10-15 NOTE — ASSESSMENT & PLAN NOTE
- notes that she has heart burn, often is laying down and lays down after eating  - discussed with patient importance of sitting upright after eating to prevent heartburn, she seemed to understand but was not very agreeable

## 2024-10-15 NOTE — ASSESSMENT & PLAN NOTE
"- on ativan, morphine, constipation medications  - signed onto hospice 2 weeks ago for breast cancer  - was refusing all medications at last 2 visits, thinks they are being \"poisoned\", continued to reassure her today about taking medications and that I am here to help, she did not agree and said she wants to leave and doesn't trust anyone here  - plan to continue to be supportive and try to gain trust  - holding off on IM antipsychotic since she is hospice patient       "

## 2024-10-15 NOTE — ASSESSMENT & PLAN NOTE
- has padmaja-colace BID and miralax daily, PRN milk of magnesia ordered for her constipation / abdominal pain symptoms  - she has been refusing medications daily, this also occurred at her last facility. She thinks she is being poisoned through medications.  - continue to monitor nursing notes, hopefully patient will adjust and become more trusting to take her medications for symptom relief  - refused abdominal x ray on 10/8/24

## 2024-11-19 ENCOUNTER — NURSING HOME VISIT (OUTPATIENT)
Dept: POST ACUTE CARE | Facility: EXTERNAL LOCATION | Age: 89
End: 2024-11-19
Payer: COMMERCIAL

## 2024-11-19 DIAGNOSIS — Z51.5 HOSPICE CARE PATIENT: Primary | ICD-10-CM

## 2024-11-19 DIAGNOSIS — I10 HTN (HYPERTENSION), BENIGN: ICD-10-CM

## 2024-11-19 DIAGNOSIS — D50.9 IRON DEFICIENCY ANEMIA, UNSPECIFIED IRON DEFICIENCY ANEMIA TYPE: ICD-10-CM

## 2024-11-19 PROCEDURE — 99309 SBSQ NF CARE MODERATE MDM 30: CPT

## 2024-11-19 NOTE — LETTER
Patient: Regi Leger  : 10/10/1930    Encounter Date: 2024    Visit  Note   Subjective   Regi Leger is a 89 y.o. female who is being seen at [facility] and evaluated for multiple medical problems. Nursing notes, vital signs, and labs were reviewed in the local facility chart.    Patient was in her usual mental state today. She was less agitated. She stated that she thinks she is being hypnotized and it is causing her right hip pain. We discussed the other alternatives of what may be causing her hip pain but she was insistent on being hypnotized. She also states that she has request Linzess but hasn't received any. She denied any other pain, discomfort, diarrhea, constipation, shortness of breath, or chest pain.         HPI     Objective   LMP  (LMP Unknown)   Physical Exam  Constitutional:       General: She is not in acute distress.     Appearance: Normal appearance.   HENT:      Head: Normocephalic and atraumatic.   Cardiovascular:      Rate and Rhythm: Normal rate and regular rhythm.      Heart sounds: No murmur heard.  Pulmonary:      Effort: Pulmonary effort is normal. No respiratory distress.      Breath sounds: Normal breath sounds. No wheezing.   Skin:     General: Skin is warm and dry.      Findings: No rash.   Neurological:      Mental Status: She is alert.     Assessment/Plan   Assessment & Plan  Iron deficiency anemia, unspecified iron deficiency anemia type  - she does not complain of any shortness of breath, fatigue, dark stools  - continue to monitor for any signs or symptoms         Hospice care patient  - Signed onto hospice in early September for breast cancer  - On ativan, morphine, constipation medications  - Patient does not complain of any abdominal pain as in previous visits         HTN (hypertension), benign  - Most recent BP's are in the 110's-120's/70's  - well controlled         Please excuse any errors in grammar or translation related to this dictation. Voice  recognition software was utilized to prepare this document.     Attestation signed by Arturo Hong MD at 11/19/2024  5:35 PM:  I saw and evaluated the patient. I personally obtained the key and critical portions of the history and physical exam or was physically present for key and critical portions performed by the resident/fellow. I reviewed the resident/fellow's documentation and discussed the patient with the resident/fellow. I agree with the resident/fellow's medical decision making as documented in the note.      Electronically Signed By: Ruthy Jennings DO   11/19/24  3:56 PM

## 2024-11-19 NOTE — PROGRESS NOTES
Visit  Note   Subjective   Regi Leger is a 89 y.o. female who is being seen at [facility] and evaluated for multiple medical problems. Nursing notes, vital signs, and labs were reviewed in the local facility chart.    Patient was in her usual mental state today. She was less agitated. She stated that she thinks she is being hypnotized and it is causing her right hip pain. We discussed the other alternatives of what may be causing her hip pain but she was insistent on being hypnotized. She also states that she has request Linzess but hasn't received any. She denied any other pain, discomfort, diarrhea, constipation, shortness of breath, or chest pain.         HPI     Objective   LMP  (LMP Unknown)   Physical Exam  Constitutional:       General: She is not in acute distress.     Appearance: Normal appearance.   HENT:      Head: Normocephalic and atraumatic.   Cardiovascular:      Rate and Rhythm: Normal rate and regular rhythm.      Heart sounds: No murmur heard.  Pulmonary:      Effort: Pulmonary effort is normal. No respiratory distress.      Breath sounds: Normal breath sounds. No wheezing.   Skin:     General: Skin is warm and dry.      Findings: No rash.   Neurological:      Mental Status: She is alert.     Assessment/Plan   Assessment & Plan  Iron deficiency anemia, unspecified iron deficiency anemia type  - she does not complain of any shortness of breath, fatigue, dark stools  - continue to monitor for any signs or symptoms         Hospice care patient  - Signed onto hospice in early September for breast cancer  - On ativan, morphine, constipation medications  - Patient does not complain of any abdominal pain as in previous visits         HTN (hypertension), benign  - Most recent BP's are in the 110's-120's/70's  - well controlled         Please excuse any errors in grammar or translation related to this dictation. Voice recognition software was utilized to prepare this document.

## 2024-11-19 NOTE — ASSESSMENT & PLAN NOTE
- Most recent BP's are in the 110's-120's/70's  - well controlled       
- Signed onto hospice in early September for breast cancer  - On ativan, morphine, constipation medications  - Patient does not complain of any abdominal pain as in previous visits         
- she does not complain of any shortness of breath, fatigue, dark stools  - continue to monitor for any signs or symptoms         
Name band;

## 2024-12-17 ENCOUNTER — NURSING HOME VISIT (OUTPATIENT)
Dept: POST ACUTE CARE | Facility: EXTERNAL LOCATION | Age: 89
End: 2024-12-17
Payer: COMMERCIAL

## 2024-12-17 DIAGNOSIS — M17.0 PRIMARY OSTEOARTHRITIS OF BOTH KNEES: ICD-10-CM

## 2024-12-17 DIAGNOSIS — K59.09 CHRONIC CONSTIPATION: ICD-10-CM

## 2024-12-17 DIAGNOSIS — Z51.5 HOSPICE CARE PATIENT: Primary | ICD-10-CM

## 2024-12-17 DIAGNOSIS — E43 UNSPECIFIED SEVERE PROTEIN-CALORIE MALNUTRITION (MULTI): ICD-10-CM

## 2024-12-17 PROCEDURE — 99309 SBSQ NF CARE MODERATE MDM 30: CPT

## 2024-12-17 NOTE — LETTER
"Patient: Regi Leger  : 10/10/1930    Encounter Date: 2024    Visit  Note   Subjective  Regi Leger is a 89 y.o. female who is being seen at [facility] and evaluated for multiple medical problems. Nursing notes, vital signs, and labs were reviewed in the local facility chart.    She states that she is having continued pain in her feet, knees, hips, abdomen, and back. The pain in her back in central. It is all day and all night without relief. She has not had a bowel movement today. Her sister and nephew were present and also stated that she has not had a bowel movement today since her suppository and milk of magnesia.         Objective  LMP  (LMP Unknown)   Physical Exam  Constitutional:       General: She is not in acute distress.     Appearance: Normal appearance.   Cardiovascular:      Rate and Rhythm: Normal rate and regular rhythm.      Heart sounds: No murmur heard.  Pulmonary:      Effort: Pulmonary effort is normal. No respiratory distress.      Breath sounds: Normal breath sounds. No wheezing, rhonchi or rales.   Abdominal:      General: Abdomen is flat.      Tenderness: There is abdominal tenderness.      Comments: There is no rebound tenderness. Bowel sounds are present in all four quadrants.    Neurological:      Mental Status: She is alert.       Assessment/Plan  Assessment & Plan  Hospice care patient  - Signed onto hospice in early September for breast cancer  - On ativan, morphine, constipation medications       Primary osteoarthritis of both knees  - Patient has pain in her knees bilaterally most likely due to her hx of osteoarthritis  - Ordered lidocaine patch for her knees and back       Chronic constipation  - Patient has a Hx of refusing medications at last facility and this current facility  - She has padmaja-colace BID and miralax daily, PRN milk of magnesia ordered for constipation  - This morning she had \"imitation pain\" and Dr. Hong evaluated her, noted to have lots of gas " "in abdomen  - She received milk of magnesia and a suppository this morning, has been taking Linzess per nursing staff  - She has not had a bowel movement today, I think that this is also causing her back pain  - Continue to monitor into tomorrow for bowel movement         Please excuse any errors in grammar or translation related to this dictation. Voice recognition software was utilized to prepare this document.     Attestation signed by Arturo Hong MD at 12/17/2024  5:59 PM:  I saw and evaluated the patient. I personally obtained the key and critical portions of the history and physical exam or was physically present for key and critical portions performed by the resident/fellow. I reviewed the resident/fellow's documentation and discussed the patient with the resident/fellow. I agree with the resident/fellow's medical decision making as documented in the note.    When I evaluated this patient she claimed that she was having \"imitation pain\" that was caused by healthcare workers here at the facility who were trying to poison her.  I attempted to reassure her but she Andrew became increasingly more agitated.  Fortunately her belly remained soft with audible bowel sounds although it was very hypertympanic to percussion.  She was given milk of magnesia and 1 suppository and had a large bowel movement resolved about 6 hours later.  Per nursing reports she has stopped complaining of pain after this.      Electronically Signed By: Ruthy Jennings DO   12/17/24  2:40 PM  "

## 2024-12-17 NOTE — PROGRESS NOTES
"Visit  Note   Subjective   Regi Leger is a 89 y.o. female who is being seen at [facility] and evaluated for multiple medical problems. Nursing notes, vital signs, and labs were reviewed in the local facility chart.    She states that she is having continued pain in her feet, knees, hips, abdomen, and back. The pain in her back in central. It is all day and all night without relief. She has not had a bowel movement today. Her sister and nephew were present and also stated that she has not had a bowel movement today since her suppository and milk of magnesia.         Objective   LMP  (LMP Unknown)   Physical Exam  Constitutional:       General: She is not in acute distress.     Appearance: Normal appearance.   Cardiovascular:      Rate and Rhythm: Normal rate and regular rhythm.      Heart sounds: No murmur heard.  Pulmonary:      Effort: Pulmonary effort is normal. No respiratory distress.      Breath sounds: Normal breath sounds. No wheezing, rhonchi or rales.   Abdominal:      General: Abdomen is flat.      Tenderness: There is abdominal tenderness.      Comments: There is no rebound tenderness. Bowel sounds are present in all four quadrants.    Neurological:      Mental Status: She is alert.       Assessment/Plan   Assessment & Plan  Hospice care patient  - Signed onto hospice in early September for breast cancer  - On ativan, morphine, constipation medications       Primary osteoarthritis of both knees  - Patient has pain in her knees bilaterally most likely due to her hx of osteoarthritis  - Ordered lidocaine patch for her knees and back       Chronic constipation  - Patient has a Hx of refusing medications at last facility and this current facility  - She has padmaja-colace BID and miralax daily, PRN milk of magnesia ordered for constipation  - This morning she had \"imitation pain\" and Dr. Hnog evaluated her, noted to have lots of gas in abdomen  - She received milk of magnesia and a suppository this " morning, has been taking Linzess per nursing staff  - She has not had a bowel movement today, I think that this is also causing her back pain  - Continue to monitor into tomorrow for bowel movement         Please excuse any errors in grammar or translation related to this dictation. Voice recognition software was utilized to prepare this document.

## 2024-12-17 NOTE — ASSESSMENT & PLAN NOTE
- Signed onto hospice in early September for breast cancer  - On ativan, morphine, constipation medications

## 2024-12-17 NOTE — ASSESSMENT & PLAN NOTE
"- Patient has a Hx of refusing medications at last facility and this current facility  - She has padmaja-colace BID and miralax daily, PRN milk of magnesia ordered for constipation  - This morning she had \"imitation pain\" and Dr. Hong evaluated her, noted to have lots of gas in abdomen  - She received milk of magnesia and a suppository this morning, has been taking Linzess per nursing staff  - She has not had a bowel movement today, I think that this is also causing her back pain  - Continue to monitor into tomorrow for bowel movement       "

## 2024-12-17 NOTE — ASSESSMENT & PLAN NOTE
- Patient has pain in her knees bilaterally most likely due to her hx of osteoarthritis  - Ordered lidocaine patch for her knees and back

## 2025-01-09 ENCOUNTER — NURSING HOME VISIT (OUTPATIENT)
Dept: POST ACUTE CARE | Facility: EXTERNAL LOCATION | Age: OVER 89
End: 2025-01-09
Payer: COMMERCIAL

## 2025-01-09 DIAGNOSIS — R45.1 AGITATION: Primary | ICD-10-CM

## 2025-01-09 DIAGNOSIS — F41.9 ANXIETY: ICD-10-CM

## 2025-01-09 DIAGNOSIS — I10 HTN (HYPERTENSION), BENIGN: ICD-10-CM

## 2025-01-09 DIAGNOSIS — R45.1 AGITATION: ICD-10-CM

## 2025-01-09 DIAGNOSIS — Z51.5 HOSPICE CARE PATIENT: ICD-10-CM

## 2025-01-09 PROCEDURE — 99309 SBSQ NF CARE MODERATE MDM 30: CPT

## 2025-01-09 RX ORDER — HALOPERIDOL 5 MG/ML
0.5 INJECTION INTRAMUSCULAR ONCE
Qty: 1 ML | Refills: 0 | Status: SHIPPED | OUTPATIENT
Start: 2025-01-09 | End: 2025-01-09 | Stop reason: SDUPTHER

## 2025-01-09 RX ORDER — LORAZEPAM 0.5 MG/1
0.5 TABLET ORAL EVERY 4 HOURS PRN
Qty: 30 TABLET | Refills: 0 | Status: SHIPPED | OUTPATIENT
Start: 2025-01-09 | End: 2025-01-16

## 2025-01-09 RX ORDER — ZIPRASIDONE MESYLATE 20 MG/ML
10 INJECTION, POWDER, LYOPHILIZED, FOR SOLUTION INTRAMUSCULAR ONCE
Qty: 10 MG | Refills: 0 | Status: SHIPPED | OUTPATIENT
Start: 2025-01-09 | End: 2025-01-09

## 2025-01-09 RX ORDER — LORAZEPAM 2 MG/ML
0.5 INJECTION INTRAMUSCULAR; INTRAVENOUS EVERY 4 HOURS PRN
Qty: 7 ML | Refills: 0 | Status: SHIPPED | OUTPATIENT
Start: 2025-01-09 | End: 2025-01-16

## 2025-01-09 RX ORDER — LORAZEPAM 2 MG/ML
0.5 INJECTION INTRAMUSCULAR; INTRAVENOUS ONCE
Qty: 1 ML | Refills: 0 | Status: SHIPPED | OUTPATIENT
Start: 2025-01-09 | End: 2025-01-09

## 2025-01-09 RX ORDER — LORAZEPAM 2 MG/ML
0.25 INJECTION INTRAMUSCULAR; INTRAVENOUS ONCE
Qty: 0.13 ML | Refills: 0 | Status: SHIPPED | OUTPATIENT
Start: 2025-01-09 | End: 2025-01-09 | Stop reason: SDUPTHER

## 2025-01-09 RX ORDER — ZIPRASIDONE MESYLATE 20 MG/ML
10 INJECTION, POWDER, LYOPHILIZED, FOR SOLUTION INTRAMUSCULAR ONCE
Qty: 10 MG | Refills: 0 | Status: SHIPPED | OUTPATIENT
Start: 2025-01-09 | End: 2025-01-09 | Stop reason: RX

## 2025-01-09 RX ORDER — HALOPERIDOL 5 MG/ML
0.5 INJECTION INTRAMUSCULAR ONCE
Qty: 1 ML | Refills: 0 | Status: SHIPPED | OUTPATIENT
Start: 2025-01-09 | End: 2025-01-09

## 2025-01-09 NOTE — ASSESSMENT & PLAN NOTE
Orders:    LORazepam (Ativan) 2 mg/mL injection; Inject 0.25 mL (0.5 mg) into the muscle every 4 hours if needed for anxiety (only use if patient is unable to take PO ativan) for up to 7 days.    LORazepam (Ativan) 2 mg/mL injection; Inject 0.25 mL (0.5 mg) into the muscle 1 time for 1 dose.

## 2025-01-09 NOTE — LETTER
Patient: Regi Leger  : 10/10/1930    Encounter Date: 2025    Visit  Note   Subjective  Regi Leger is a 89 y.o. female who is being seen at [facility] and evaluated for multiple medical problems. Nursing notes, vital signs, and labs were reviewed in the local facility chart. Orders and medications were reviewed on the local chart.  No chief complaint on file.     This is a 89 year old female I was requested to evaluate today for acute agitation and restlessness. She is currently managed in conjunction with hospice care. She is yelling out, attempting to get out of bed, combative and resistant with staff currently. She does not ambulate and has been bed bound for quite some time now.          Objective  LMP  (LMP Unknown)   Physical Exam  HENT:      Head: Normocephalic.   Cardiovascular:      Rate and Rhythm: Normal rate and regular rhythm.   Pulmonary:      Effort: Pulmonary effort is normal. No respiratory distress.      Comments: Limited exam patient is pushing provider away  Neurological:      Mental Status: She is alert.   Psychiatric:      Comments: Agitated, yelling out, combative with staff/care         Assessment & Plan  Agitation  Gave 0.25mg IM ativan and ordered PO ativan for agitation Q4 prn. This was effective for about 2 hours and nursing contacted me for additional agitation support; additional dose of 0.5 mg IM ativan once given. Ordered seroquel 25 mg daily as well.    Orders:  •  LORazepam (Ativan) 2 mg/mL injection; Inject 0.25 mL (0.5 mg) into the muscle every 4 hours if needed for anxiety (only use if patient is unable to take PO ativan) for up to 7 days.  •  LORazepam (Ativan) 2 mg/mL injection; Inject 0.25 mL (0.5 mg) into the muscle 1 time for 1 dose.    Hospice care patient    Orders:  •  LORazepam (Ativan) 2 mg/mL injection; Inject 0.25 mL (0.5 mg) into the muscle every 4 hours if needed for anxiety (only use if patient is unable to take PO ativan) for up to 7 days.  •   LORazepam (Ativan) 2 mg/mL injection; Inject 0.25 mL (0.5 mg) into the muscle 1 time for 1 dose.    HTN (hypertension), benign  Well controlled                Please excuse any errors in grammar or translation related to this dictation. Voice recognition software was utilized to prepare this document.       Electronically Signed By: NEFTALY Barnett   1/9/25  1:53 PM

## 2025-01-09 NOTE — PROGRESS NOTES
Visit  Note   Subjective   Regi Leger is a 89 y.o. female who is being seen at [facility] and evaluated for multiple medical problems. Nursing notes, vital signs, and labs were reviewed in the local facility chart. Orders and medications were reviewed on the local chart.  No chief complaint on file.     This is a 89 year old female I was requested to evaluate today for acute agitation and restlessness. She is currently managed in conjunction with hospice care. She is yelling out, attempting to get out of bed, combative and resistant with staff currently. She does not ambulate and has been bed bound for quite some time now.          Objective   LMP  (LMP Unknown)   Physical Exam  HENT:      Head: Normocephalic.   Cardiovascular:      Rate and Rhythm: Normal rate and regular rhythm.   Pulmonary:      Effort: Pulmonary effort is normal. No respiratory distress.      Comments: Limited exam patient is pushing provider away  Neurological:      Mental Status: She is alert.   Psychiatric:      Comments: Agitated, yelling out, combative with staff/care         Assessment & Plan  Agitation  Gave 0.25mg IM ativan and ordered PO ativan for agitation Q4 prn. This was effective for about 2 hours and nursing contacted me for additional agitation support; additional dose of 0.5 mg IM ativan once given. Ordered seroquel 25 mg daily as well.    Orders:    LORazepam (Ativan) 2 mg/mL injection; Inject 0.25 mL (0.5 mg) into the muscle every 4 hours if needed for anxiety (only use if patient is unable to take PO ativan) for up to 7 days.    LORazepam (Ativan) 2 mg/mL injection; Inject 0.25 mL (0.5 mg) into the muscle 1 time for 1 dose.    Hospice care patient    Orders:    LORazepam (Ativan) 2 mg/mL injection; Inject 0.25 mL (0.5 mg) into the muscle every 4 hours if needed for anxiety (only use if patient is unable to take PO ativan) for up to 7 days.    LORazepam (Ativan) 2 mg/mL injection; Inject 0.25 mL (0.5 mg) into the  muscle 1 time for 1 dose.    HTN (hypertension), benign  Well controlled                Please excuse any errors in grammar or translation related to this dictation. Voice recognition software was utilized to prepare this document.

## 2025-01-21 ENCOUNTER — NURSING HOME VISIT (OUTPATIENT)
Dept: POST ACUTE CARE | Facility: EXTERNAL LOCATION | Age: OVER 89
End: 2025-01-21
Payer: COMMERCIAL

## 2025-01-21 DIAGNOSIS — K59.09 CHRONIC CONSTIPATION: ICD-10-CM

## 2025-01-21 DIAGNOSIS — E43 UNSPECIFIED SEVERE PROTEIN-CALORIE MALNUTRITION (MULTI): ICD-10-CM

## 2025-01-21 DIAGNOSIS — N18.30 STAGE 3 CHRONIC KIDNEY DISEASE, UNSPECIFIED WHETHER STAGE 3A OR 3B CKD (MULTI): ICD-10-CM

## 2025-01-21 DIAGNOSIS — Z51.5 HOSPICE CARE PATIENT: Primary | ICD-10-CM

## 2025-01-21 PROBLEM — C50.912 BREAST CANCER, LEFT (MULTI): Status: RESOLVED | Noted: 2024-01-08 | Resolved: 2025-01-21

## 2025-01-21 PROCEDURE — 99308 SBSQ NF CARE LOW MDM 20: CPT

## 2025-01-21 NOTE — LETTER
Patient: Regi Leger  : 10/10/1930    Encounter Date: 2025    Visit  Note   Subjective  Regi Leger is a 89 y.o. female who is being seen at [facility] and evaluated for multiple medical problems. Nursing notes, vital signs, and labs were reviewed in the local facility chart. Orders and medications were reviewed on the local chart.    Patient was sleeping on exam today and resting comfortably.     HPI     Objective  LMP  (LMP Unknown)   Physical Exam  Constitutional:       General: She is not in acute distress.     Appearance: Normal appearance.   HENT:      Head: Normocephalic and atraumatic.   Pulmonary:      Effort: Pulmonary effort is normal. No respiratory distress.   Abdominal:      General: Abdomen is flat.      Palpations: Abdomen is soft.   Musculoskeletal:      Right lower leg: No edema.      Left lower leg: No edema.   Skin:     General: Skin is warm and dry.      Findings: No rash.   Neurological:      Mental Status: She is alert.       Assessment/Plan  Assessment & Plan  Hospice care patient  - Signed onto hospice in early September for breast cancer  - On ativan, morphine, constipation medications       Chronic constipation  - Patient has a Hx of refusing medications at last facility and this current facility  - She has padmaja-colace BID and miralax daily, PRN milk of magnesia ordered for constipation, PRN suppository  -Per family member present no complaints as of today or lately          Unspecified severe protein-calorie malnutrition (Multi)  Her weight is 146 lbs down from 150. This is on par with her end of life diagnosis.   - Continue to monitor weights and encourage PO intake          Please excuse any errors in grammar or translation related to this dictation. Voice recognition software was utilized to prepare this document.     Attestation signed by Arturo Hong MD at 2025  3:55 PM:  I saw and evaluated the patient. I personally obtained the key and critical portions of  the history and physical exam or was physically present for key and critical portions performed by the resident/fellow. I reviewed the resident/fellow's documentation and discussed the patient with the resident/fellow. I agree with the resident/fellow's medical decision making as documented in the note.      Electronically Signed By: Ruthy Jennings DO   1/21/25  2:01 PM

## 2025-01-21 NOTE — PROGRESS NOTES
Visit  Note   Subjective   Regi Leger is a 89 y.o. female who is being seen at [facility] and evaluated for multiple medical problems. Nursing notes, vital signs, and labs were reviewed in the local facility chart. Orders and medications were reviewed on the local chart.    Patient was sleeping on exam today and resting comfortably.     HPI     Objective   LMP  (LMP Unknown)   Physical Exam  Constitutional:       General: She is not in acute distress.     Appearance: Normal appearance.   HENT:      Head: Normocephalic and atraumatic.   Pulmonary:      Effort: Pulmonary effort is normal. No respiratory distress.   Abdominal:      General: Abdomen is flat.      Palpations: Abdomen is soft.   Musculoskeletal:      Right lower leg: No edema.      Left lower leg: No edema.   Skin:     General: Skin is warm and dry.      Findings: No rash.   Neurological:      Mental Status: She is alert.       Assessment/Plan   Assessment & Plan  Hospice care patient  - Signed onto hospice in early September for breast cancer  - On ativan, morphine, constipation medications       Chronic constipation  - Patient has a Hx of refusing medications at last facility and this current facility  - She has padmaja-colace BID and miralax daily, PRN milk of magnesia ordered for constipation, PRN suppository  -Per family member present no complaints as of today or lately          Unspecified severe protein-calorie malnutrition (Multi)  Her weight is 146 lbs down from 150. This is on par with her end of life diagnosis.   - Continue to monitor weights and encourage PO intake          Please excuse any errors in grammar or translation related to this dictation. Voice recognition software was utilized to prepare this document.

## 2025-01-21 NOTE — ASSESSMENT & PLAN NOTE
- Patient has a Hx of refusing medications at last facility and this current facility  - She has padmaja-colace BID and miralax daily, PRN milk of magnesia ordered for constipation, PRN suppository  -Per family member present no complaints as of today or lately

## 2025-01-21 NOTE — ASSESSMENT & PLAN NOTE
Her weight is 146 lbs down from 150. This is on par with her end of life diagnosis.   - Continue to monitor weights and encourage PO intake

## 2025-02-18 ENCOUNTER — NURSING HOME VISIT (OUTPATIENT)
Dept: POST ACUTE CARE | Facility: EXTERNAL LOCATION | Age: OVER 89
End: 2025-02-18
Payer: COMMERCIAL

## 2025-02-18 DIAGNOSIS — Z51.5 HOSPICE CARE PATIENT: Primary | ICD-10-CM

## 2025-02-18 DIAGNOSIS — K59.09 CHRONIC CONSTIPATION: ICD-10-CM

## 2025-02-18 DIAGNOSIS — M17.0 PRIMARY OSTEOARTHRITIS OF BOTH KNEES: ICD-10-CM

## 2025-02-18 PROBLEM — I12.9 HYPERTENSIVE KIDNEY DISEASE WITH CKD STAGE III (MULTI): Status: RESOLVED | Noted: 2024-01-08 | Resolved: 2025-02-18

## 2025-02-18 PROBLEM — N18.30 HYPERTENSIVE KIDNEY DISEASE WITH CKD STAGE III (MULTI): Status: RESOLVED | Noted: 2024-01-08 | Resolved: 2025-02-18

## 2025-02-18 PROCEDURE — 99308 SBSQ NF CARE LOW MDM 20: CPT

## 2025-02-18 NOTE — ASSESSMENT & PLAN NOTE
- Has hx of refusing medications at last facility and also this facility  - Has padmaja-colace BID and miralax daily prescribed as well as milk of magnesia and PRN suppository  - Recently had another fecal impaction that required disimpaction  - Per son present, there have been no complaints today

## 2025-02-18 NOTE — ASSESSMENT & PLAN NOTE
- No recent complaints of pain from the son  - Lidocaine patch for knees and back ordered PRN for pain, continue as needed

## 2025-02-18 NOTE — LETTER
Patient: Regi Leger  : 10/10/1930    Encounter Date: 2025    Visit  Note   Subjective  Regi Leger is a 89 y.o. female who is being seen at [facility] and evaluated for multiple medical problems. Nursing notes, vital signs, and labs were reviewed in the local facility chart. Orders and medications were reviewed on the local chart.    Patient is being transferred to inpatient hospice facility tomorrow. Spoke with son and sister at her bedside. They think this is a good idea due to her recent fecal impaction again. They said she has been resting comfortably today.      Objective  LMP  (LMP Unknown)   Physical Exam  HENT:      Head: Normocephalic and atraumatic.   Eyes:      Conjunctiva/sclera: Conjunctivae normal.   Cardiovascular:      Rate and Rhythm: Normal rate and regular rhythm.      Heart sounds: No murmur heard.  Pulmonary:      Effort: Pulmonary effort is normal. No respiratory distress.      Breath sounds: Normal breath sounds. No wheezing.   Abdominal:      General: Abdomen is flat. Bowel sounds are normal. There is no distension.      Palpations: Abdomen is soft.   Neurological:      Mental Status: She is alert.       Assessment/Plan  Assessment & Plan  Hospice care patient  - Signed onto hospice in early September for breast cancer  - On ativan, morphine, constipation medications  - Patient is transferring to inpatient hospice facility tomorrow. Spoke with family (son and sister) at bedside today and they are agreeable to plan due to her recurrent fecal impaction and higher needs.       Chronic constipation  - Has hx of refusing medications at last facility and also this facility  - Has padmaja-colace BID and miralax daily prescribed as well as milk of magnesia and PRN suppository  - Recently had another fecal impaction that required disimpaction  - Per son present, there have been no complaints today       Primary osteoarthritis of both knees  - No recent complaints of pain from the  son  - Lidocaine patch for knees and back ordered PRN for pain, continue as needed          Please excuse any errors in grammar or translation related to this dictation. Voice recognition software was utilized to prepare this document.     Attestation signed by Arturo Hong MD at 2/18/2025  4:33 PM:  I saw and evaluated the patient. I personally obtained the key and critical portions of the history and physical exam or was physically present for key and critical portions performed by the resident/fellow. I reviewed the resident/fellow's documentation and discussed the patient with the resident/fellow. I agree with the resident/fellow's medical decision making as documented in the note.      Electronically Signed By: Ruthy Jennings DO   2/18/25  2:59 PM

## 2025-02-18 NOTE — PROGRESS NOTES
Visit  Note   Subjective   Regi Leger is a 89 y.o. female who is being seen at [facility] and evaluated for multiple medical problems. Nursing notes, vital signs, and labs were reviewed in the local facility chart. Orders and medications were reviewed on the local chart.    Patient is being transferred to inpatient hospice facility tomorrow. Spoke with son and sister at her bedside. They think this is a good idea due to her recent fecal impaction again. They said she has been resting comfortably today.      Objective   LMP  (LMP Unknown)   Physical Exam  HENT:      Head: Normocephalic and atraumatic.   Eyes:      Conjunctiva/sclera: Conjunctivae normal.   Cardiovascular:      Rate and Rhythm: Normal rate and regular rhythm.      Heart sounds: No murmur heard.  Pulmonary:      Effort: Pulmonary effort is normal. No respiratory distress.      Breath sounds: Normal breath sounds. No wheezing.   Abdominal:      General: Abdomen is flat. Bowel sounds are normal. There is no distension.      Palpations: Abdomen is soft.   Neurological:      Mental Status: She is alert.       Assessment/Plan   Assessment & Plan  Hospice care patient  - Signed onto hospice in early September for breast cancer  - On ativan, morphine, constipation medications  - Patient is transferring to inpatient hospice facility tomorrow. Spoke with family (son and sister) at bedside today and they are agreeable to plan due to her recurrent fecal impaction and higher needs.       Chronic constipation  - Has hx of refusing medications at last facility and also this facility  - Has padmaja-colace BID and miralax daily prescribed as well as milk of magnesia and PRN suppository  - Recently had another fecal impaction that required disimpaction  - Per son present, there have been no complaints today       Primary osteoarthritis of both knees  - No recent complaints of pain from the son  - Lidocaine patch for knees and back ordered PRN for pain, continue as  needed          Please excuse any errors in grammar or translation related to this dictation. Voice recognition software was utilized to prepare this document.

## 2025-02-18 NOTE — ASSESSMENT & PLAN NOTE
- Signed onto hospice in early September for breast cancer  - On ativan, morphine, constipation medications  - Patient is transferring to inpatient hospice facility tomorrow. Spoke with family (son and sister) at bedside today and they are agreeable to plan due to her recurrent fecal impaction and higher needs.